# Patient Record
Sex: MALE | Race: WHITE | NOT HISPANIC OR LATINO | Employment: OTHER | URBAN - METROPOLITAN AREA
[De-identification: names, ages, dates, MRNs, and addresses within clinical notes are randomized per-mention and may not be internally consistent; named-entity substitution may affect disease eponyms.]

---

## 2021-08-11 ENCOUNTER — TRANSCRIBE ORDERS (OUTPATIENT)
Dept: SLEEP MEDICINE | Facility: HOSPITAL | Age: 61
End: 2021-08-11

## 2021-08-11 DIAGNOSIS — G47.30 SLEEP APNEA, UNSPECIFIED TYPE: Primary | ICD-10-CM

## 2021-08-15 ENCOUNTER — HOSPITAL ENCOUNTER (OUTPATIENT)
Dept: SLEEP MEDICINE | Facility: HOSPITAL | Age: 61
Discharge: HOME | End: 2021-08-15
Admitting: NURSE PRACTITIONER

## 2021-08-15 DIAGNOSIS — G47.30 SLEEP APNEA, UNSPECIFIED TYPE: ICD-10-CM

## 2021-08-15 PROCEDURE — 95810 POLYSOM 6/> YRS 4/> PARAM: CPT | Performed by: INTERNAL MEDICINE

## 2021-08-15 PROCEDURE — 95810 POLYSOM 6/> YRS 4/> PARAM: CPT

## 2022-06-26 ENCOUNTER — HOSPITAL ENCOUNTER (EMERGENCY)
Facility: HOSPITAL | Age: 62
Discharge: HOME | End: 2022-06-26
Attending: EMERGENCY MEDICINE | Admitting: EMERGENCY MEDICINE

## 2022-06-26 VITALS
SYSTOLIC BLOOD PRESSURE: 157 MMHG | BODY MASS INDEX: 44.16 KG/M2 | OXYGEN SATURATION: 96 % | RESPIRATION RATE: 18 BRPM | DIASTOLIC BLOOD PRESSURE: 81 MMHG | WEIGHT: 240 LBS | TEMPERATURE: 97.7 F | HEIGHT: 62 IN | HEART RATE: 73 BPM

## 2022-06-26 DIAGNOSIS — L25.9 CONTACT DERMATITIS, UNSPECIFIED CONTACT DERMATITIS TYPE, UNSPECIFIED TRIGGER: Primary | ICD-10-CM

## 2022-06-26 PROCEDURE — 99282 EMERGENCY DEPT VISIT SF MDM: CPT

## 2022-06-26 PROCEDURE — 25010000002 DEXAMETHASONE PER 1 MG: Performed by: NURSE PRACTITIONER

## 2022-06-26 PROCEDURE — 96372 THER/PROPH/DIAG INJ SC/IM: CPT

## 2022-06-26 RX ORDER — ASPIRIN 81 MG/1
81 TABLET, CHEWABLE ORAL DAILY
COMMUNITY

## 2022-06-26 RX ORDER — DIAPER,BRIEF,INFANT-TODD,DISP
1 EACH MISCELLANEOUS 2 TIMES DAILY
Qty: 56 G | Refills: 0 | Status: SHIPPED | OUTPATIENT
Start: 2022-06-26

## 2022-06-26 RX ORDER — PREDNISONE 20 MG/1
TABLET ORAL
Qty: 18 TABLET | Refills: 0 | Status: SHIPPED | OUTPATIENT
Start: 2022-06-26 | End: 2022-07-05

## 2022-06-26 RX ORDER — CEPHALEXIN 500 MG/1
500 CAPSULE ORAL 2 TIMES DAILY
Qty: 14 CAPSULE | Refills: 0 | Status: SHIPPED | OUTPATIENT
Start: 2022-06-26 | End: 2022-07-11 | Stop reason: SDUPTHER

## 2022-06-26 RX ORDER — DEXAMETHASONE SODIUM PHOSPHATE 10 MG/ML
10 INJECTION INTRAMUSCULAR; INTRAVENOUS ONCE
Status: COMPLETED | OUTPATIENT
Start: 2022-06-26 | End: 2022-06-26

## 2022-06-26 RX ORDER — CETIRIZINE HYDROCHLORIDE 10 MG/1
10 TABLET ORAL DAILY
Qty: 30 TABLET | Refills: 0 | Status: SHIPPED | OUTPATIENT
Start: 2022-06-26

## 2022-06-26 RX ORDER — BUMETANIDE 2 MG/1
2 TABLET ORAL DAILY
COMMUNITY

## 2022-06-26 RX ORDER — OXYCODONE HYDROCHLORIDE AND ACETAMINOPHEN 5; 325 MG/1; MG/1
1 TABLET ORAL EVERY 6 HOURS PRN
COMMUNITY

## 2022-06-26 RX ADMIN — DEXAMETHASONE SODIUM PHOSPHATE 10 MG: 10 INJECTION, SOLUTION INTRAMUSCULAR; INTRAVENOUS at 20:23

## 2022-07-11 ENCOUNTER — HOSPITAL ENCOUNTER (EMERGENCY)
Facility: HOSPITAL | Age: 62
Discharge: HOME | End: 2022-07-11
Attending: EMERGENCY MEDICINE | Admitting: EMERGENCY MEDICINE

## 2022-07-11 VITALS
RESPIRATION RATE: 20 BRPM | TEMPERATURE: 97.5 F | SYSTOLIC BLOOD PRESSURE: 162 MMHG | WEIGHT: 236.33 LBS | OXYGEN SATURATION: 99 % | HEART RATE: 84 BPM | BODY MASS INDEX: 43.23 KG/M2 | DIASTOLIC BLOOD PRESSURE: 88 MMHG

## 2022-07-11 DIAGNOSIS — L30.9 DERMATITIS: Primary | ICD-10-CM

## 2022-07-11 DIAGNOSIS — L70.9 ACNE, UNSPECIFIED ACNE TYPE: ICD-10-CM

## 2022-07-11 DIAGNOSIS — B35.4 TINEA CORPORIS: ICD-10-CM

## 2022-07-11 PROCEDURE — 87205 SMEAR GRAM STAIN: CPT

## 2022-07-11 PROCEDURE — 99283 EMERGENCY DEPT VISIT LOW MDM: CPT

## 2022-07-11 PROCEDURE — 25010000002 DEXAMETHASONE PER 1 MG

## 2022-07-11 PROCEDURE — 87070 CULTURE OTHR SPECIMN AEROBIC: CPT

## 2022-07-11 PROCEDURE — 96372 THER/PROPH/DIAG INJ SC/IM: CPT

## 2022-07-11 RX ORDER — DEXAMETHASONE SODIUM PHOSPHATE 10 MG/ML
10 INJECTION INTRAMUSCULAR; INTRAVENOUS ONCE
Status: COMPLETED | OUTPATIENT
Start: 2022-07-11 | End: 2022-07-11

## 2022-07-11 RX ORDER — KETOCONAZOLE 20 MG/G
1 CREAM TOPICAL DAILY
Qty: 14 G | Refills: 0 | Status: SHIPPED | OUTPATIENT
Start: 2022-07-11 | End: 2022-07-25

## 2022-07-11 RX ORDER — CEPHALEXIN 500 MG/1
500 CAPSULE ORAL 2 TIMES DAILY
Qty: 14 CAPSULE | Refills: 0 | Status: SHIPPED | OUTPATIENT
Start: 2022-07-11

## 2022-07-11 RX ADMIN — DEXAMETHASONE SODIUM PHOSPHATE 10 MG: 10 INJECTION INTRAMUSCULAR; INTRAVENOUS at 20:10

## 2022-07-11 NOTE — ED PROVIDER NOTES
Subjective   Patient 61-year-old male presenting today due to a rash.  Patient states that he was seen here on June 26 for the same thing and as soon as he finished his antibiotics the rash reappeared.  Patient was also prescribed hydrocortisone and states that it does not help.  Patient states he has a open sore on the buttocks, a circular rash on his left calf in small little bumps across his chest and back.  Patient denies any changes in laundry detergent or soaps.  He has a appointment scheduled with his PCP for this Friday and has not seen a dermatologist yet.       History provided by:  Patient   used: No    Rash  Location:  Torso and leg  Torso rash location:  L chest and R chest  Quality: itchiness    Quality: not draining, not red and not weeping    Context: animal contact and exposure to similar rash    Context: not medications and not new detergent/soap    Relieved by: abx.  Ineffective treatments:  Topical steroids      Review of Systems   Constitutional: Negative.    HENT: Negative.    Eyes: Negative.    Respiratory: Negative.    Cardiovascular: Negative.    Gastrointestinal: Negative.    Endocrine: Negative.    Genitourinary: Negative.    Musculoskeletal: Negative.    Skin: Positive for rash.   Allergic/Immunologic: Negative.    Neurological: Negative.    Hematological: Negative.    Psychiatric/Behavioral: Negative.        Past Medical History:   Diagnosis Date   • Hypertension    • Injury of back        Allergies   Allergen Reactions   • Dilantin [Phenytoin] Rash   • Latex Rash       Past Surgical History:   Procedure Laterality Date   • BACK SURGERY     • CARDIAC CATHETERIZATION     • CARPAL TUNNEL RELEASE     • ORIF FOREARM FRACTURE     • PATELLA FRACTURE SURGERY     • REPLACEMENT TOTAL KNEE         History reviewed. No pertinent family history.    Social History     Socioeconomic History   • Marital status:    Tobacco Use   • Smoking status: Former Smoker     Packs/day:  0.50     Types: Cigarettes   • Smokeless tobacco: Never Used   Vaping Use   • Vaping Use: Never used   Substance and Sexual Activity   • Alcohol use: Yes     Comment: social   • Drug use: Never           Objective   Physical Exam  Vitals and nursing note reviewed.   Constitutional:       Appearance: Normal appearance.   HENT:      Head: Normocephalic and atraumatic.      Right Ear: Tympanic membrane normal.      Left Ear: Tympanic membrane normal.      Nose: Nose normal.      Mouth/Throat:      Mouth: Mucous membranes are moist.   Eyes:      Extraocular Movements: Extraocular movements intact.      Conjunctiva/sclera: Conjunctivae normal.      Pupils: Pupils are equal, round, and reactive to light.   Cardiovascular:      Rate and Rhythm: Normal rate and regular rhythm.      Heart sounds: Normal heart sounds.   Pulmonary:      Effort: Pulmonary effort is normal.      Breath sounds: Normal breath sounds.   Musculoskeletal:         General: Normal range of motion.        Arms:       Cervical back: Normal range of motion and neck supple.        Legs:    Skin:     General: Skin is warm and dry.      Findings: Rash present.   Neurological:      General: No focal deficit present.      Mental Status: He is alert and oriented to person, place, and time.   Psychiatric:         Mood and Affect: Mood normal.         Behavior: Behavior normal.         Procedures           ED Course                                           MDM  Number of Diagnoses or Management Options  Diagnosis management comments: I have spoken with patient. I have explained the patient´s condition, diagnoses and treatment plan based on the information available to me at this time. I have answered the patient's questions and addressed any concerns. The patient has a good  understanding of the patient´s diagnosis, condition, and treatment plan as can be expected at this point. The vital signs have been stable. The patient´s condition is stable and appropriate  for discharge from the emergency department.      The patient will pursue further outpatient evaluation with the primary care physician or other designated or consulting physician as outlined in the discharge instructions. They are agreeable to this plan of care and follow-up instructions have been explained in detail. The patient has received these instructions in written format and have expressed an understanding of the discharge instructions. The patient is aware that any significant change in condition or worsening of symptoms should prompt an immediate return to this or the closest emergency department or call to 911.         Amount and/or Complexity of Data Reviewed  Clinical lab tests: ordered    Risk of Complications, Morbidity, and/or Mortality  Presenting problems: low  Diagnostic procedures: low  Management options: low    Patient Progress  Patient progress: stable      Final diagnoses:   Dermatitis   Tinea corporis   Acne, unspecified acne type       ED Disposition  ED Disposition     ED Disposition   Discharge    Condition   Stable    Comment   --             Vishal Jasso MD  [id number] [id number] [phone]    In 3 days           Medication List      New Prescriptions    ketoconazole 2 % cream  Commonly known as: NIZORAL  Apply 1 application topically to the appropriate area as directed Daily for 14 days.           Where to Get Your Medications      These medications were sent to [facility]/pharmacy #[id number] - [place] - [address] - [phone]  - [phone] FX  [address] [place] [address]    Hours: 24-hours Phone: [phone]   · cephalexin 500 MG capsule  · ketoconazole 2 % cream          Johann Rubalcava PA-C  07/11/22 2001

## 2022-07-12 NOTE — DISCHARGE INSTRUCTIONS
Please take antibiotics as prescribed until finished  Please apply ketoconazole cream on area to the left calf    You can use hydrocortisone cream or acne body wash on chest and upper back    You should receive a phone call from [facility] with lab results in about 2 or 3 days    Please follow-up with your PCP this coming Friday in get a dermatology referral

## 2022-07-14 LAB
BACTERIA SPEC AEROBE CULT: NORMAL
BACTERIA SPEC AEROBE CULT: NORMAL
GRAM STN SPEC: NORMAL
GRAM STN SPEC: NORMAL

## 2022-09-02 ENCOUNTER — TRANSCRIBE ORDERS (OUTPATIENT)
Dept: LAB | Facility: HOSPITAL | Age: 62
End: 2022-09-02

## 2022-09-02 ENCOUNTER — LAB (OUTPATIENT)
Dept: LAB | Facility: HOSPITAL | Age: 62
End: 2022-09-02

## 2022-09-02 DIAGNOSIS — Z79.899 ENCOUNTER FOR LONG-TERM (CURRENT) USE OF OTHER MEDICATIONS: ICD-10-CM

## 2022-09-02 DIAGNOSIS — L20.89 OTHER ATOPIC DERMATITIS: ICD-10-CM

## 2022-09-02 DIAGNOSIS — L20.89 OTHER ATOPIC DERMATITIS: Primary | ICD-10-CM

## 2022-09-02 LAB
ALBUMIN SERPL-MCNC: 4.2 G/DL (ref 3.5–5.2)
ALBUMIN/GLOB SERPL: 2 G/DL
ALP SERPL-CCNC: 60 U/L (ref 39–117)
ALT SERPL W P-5'-P-CCNC: 11 U/L (ref 1–41)
ANION GAP SERPL CALCULATED.3IONS-SCNC: 10 MMOL/L (ref 5–15)
AST SERPL-CCNC: 15 U/L (ref 1–40)
BASOPHILS # BLD AUTO: 0.05 10*3/MM3 (ref 0–0.2)
BASOPHILS NFR BLD AUTO: 0.7 % (ref 0–1.5)
BILIRUB SERPL-MCNC: 0.5 MG/DL (ref 0–1.2)
BUN SERPL-MCNC: 13 MG/DL (ref 8–23)
BUN/CREAT SERPL: 11.1 (ref 7–25)
CALCIUM SPEC-SCNC: 8.8 MG/DL (ref 8.6–10.5)
CHLORIDE SERPL-SCNC: 105 MMOL/L (ref 98–107)
CO2 SERPL-SCNC: 24 MMOL/L (ref 22–29)
CREAT SERPL-MCNC: 1.17 MG/DL (ref 0.76–1.27)
DEPRECATED RDW RBC AUTO: 44.7 FL (ref 37–54)
EGFRCR SERPLBLD CKD-EPI 2021: 70.9 ML/MIN/1.73
EOSINOPHIL # BLD AUTO: 0.35 10*3/MM3 (ref 0–0.4)
EOSINOPHIL NFR BLD AUTO: 4.9 % (ref 0.3–6.2)
ERYTHROCYTE [DISTWIDTH] IN BLOOD BY AUTOMATED COUNT: 13.4 % (ref 12.3–15.4)
GLOBULIN UR ELPH-MCNC: 2.1 GM/DL
GLUCOSE SERPL-MCNC: 93 MG/DL (ref 65–99)
HAV IGM SERPL QL IA: NORMAL
HBV CORE IGM SERPL QL IA: NORMAL
HBV SURFACE AG SERPL QL IA: NORMAL
HCT VFR BLD AUTO: 39.5 % (ref 37.5–51)
HCV AB SER DONR QL: NORMAL
HGB BLD-MCNC: 13.9 G/DL (ref 13–17.7)
IMM GRANULOCYTES # BLD AUTO: 0.02 10*3/MM3 (ref 0–0.05)
IMM GRANULOCYTES NFR BLD AUTO: 0.3 % (ref 0–0.5)
LYMPHOCYTES # BLD AUTO: 1.84 10*3/MM3 (ref 0.7–3.1)
LYMPHOCYTES NFR BLD AUTO: 25.9 % (ref 19.6–45.3)
MCH RBC QN AUTO: 32.4 PG (ref 26.6–33)
MCHC RBC AUTO-ENTMCNC: 35.2 G/DL (ref 31.5–35.7)
MCV RBC AUTO: 92.1 FL (ref 79–97)
MONOCYTES # BLD AUTO: 0.56 10*3/MM3 (ref 0.1–0.9)
MONOCYTES NFR BLD AUTO: 7.9 % (ref 5–12)
NEUTROPHILS NFR BLD AUTO: 4.28 10*3/MM3 (ref 1.7–7)
NEUTROPHILS NFR BLD AUTO: 60.3 % (ref 42.7–76)
NRBC BLD AUTO-RTO: 0 /100 WBC (ref 0–0.2)
PLATELET # BLD AUTO: 205 10*3/MM3 (ref 140–450)
PMV BLD AUTO: 10.7 FL (ref 6–12)
POTASSIUM SERPL-SCNC: 3.6 MMOL/L (ref 3.5–5.2)
PROT SERPL-MCNC: 6.3 G/DL (ref 6–8.5)
RBC # BLD AUTO: 4.29 10*6/MM3 (ref 4.14–5.8)
SODIUM SERPL-SCNC: 139 MMOL/L (ref 136–145)
WBC NRBC COR # BLD: 7.1 10*3/MM3 (ref 3.4–10.8)

## 2022-09-02 PROCEDURE — 36415 COLL VENOUS BLD VENIPUNCTURE: CPT

## 2022-09-02 PROCEDURE — 80074 ACUTE HEPATITIS PANEL: CPT

## 2022-09-02 PROCEDURE — 85025 COMPLETE CBC W/AUTO DIFF WBC: CPT

## 2022-09-02 PROCEDURE — 80053 COMPREHEN METABOLIC PANEL: CPT

## 2022-09-28 ENCOUNTER — TRANSCRIBE ORDERS (OUTPATIENT)
Dept: ADMINISTRATIVE | Facility: HOSPITAL | Age: 62
End: 2022-09-28

## 2022-09-28 ENCOUNTER — LAB (OUTPATIENT)
Dept: LAB | Facility: HOSPITAL | Age: 62
End: 2022-09-28

## 2022-09-28 DIAGNOSIS — L20.81 DIFFUSE NEURODERMATITIS OF BROCQ: ICD-10-CM

## 2022-09-28 DIAGNOSIS — Z79.899 ENCOUNTER FOR LONG-TERM (CURRENT) USE OF OTHER MEDICATIONS: Primary | ICD-10-CM

## 2022-09-28 DIAGNOSIS — Z79.899 ENCOUNTER FOR LONG-TERM (CURRENT) USE OF OTHER MEDICATIONS: ICD-10-CM

## 2022-09-28 LAB
ALBUMIN SERPL-MCNC: 4.3 G/DL (ref 3.5–5.2)
ALBUMIN/GLOB SERPL: 2 G/DL
ALP SERPL-CCNC: 68 U/L (ref 39–117)
ALT SERPL W P-5'-P-CCNC: 10 U/L (ref 1–41)
ANION GAP SERPL CALCULATED.3IONS-SCNC: 13 MMOL/L (ref 5–15)
AST SERPL-CCNC: 22 U/L (ref 1–40)
BASOPHILS # BLD AUTO: 0.08 10*3/MM3 (ref 0–0.2)
BASOPHILS NFR BLD AUTO: 1.1 % (ref 0–1.5)
BILIRUB SERPL-MCNC: 0.6 MG/DL (ref 0–1.2)
BUN SERPL-MCNC: 13 MG/DL (ref 8–23)
BUN/CREAT SERPL: 12.6 (ref 7–25)
CALCIUM SPEC-SCNC: 9 MG/DL (ref 8.6–10.5)
CHLORIDE SERPL-SCNC: 103 MMOL/L (ref 98–107)
CO2 SERPL-SCNC: 23 MMOL/L (ref 22–29)
CREAT SERPL-MCNC: 1.03 MG/DL (ref 0.76–1.27)
DEPRECATED RDW RBC AUTO: 45.5 FL (ref 37–54)
EGFRCR SERPLBLD CKD-EPI 2021: 82.6 ML/MIN/1.73
EOSINOPHIL # BLD AUTO: 0.44 10*3/MM3 (ref 0–0.4)
EOSINOPHIL NFR BLD AUTO: 6.2 % (ref 0.3–6.2)
ERYTHROCYTE [DISTWIDTH] IN BLOOD BY AUTOMATED COUNT: 13.1 % (ref 12.3–15.4)
GLOBULIN UR ELPH-MCNC: 2.2 GM/DL
GLUCOSE SERPL-MCNC: 107 MG/DL (ref 65–99)
HCT VFR BLD AUTO: 44.2 % (ref 37.5–51)
HGB BLD-MCNC: 14.9 G/DL (ref 13–17.7)
IMM GRANULOCYTES # BLD AUTO: 0.01 10*3/MM3 (ref 0–0.05)
IMM GRANULOCYTES NFR BLD AUTO: 0.1 % (ref 0–0.5)
LYMPHOCYTES # BLD AUTO: 1.84 10*3/MM3 (ref 0.7–3.1)
LYMPHOCYTES NFR BLD AUTO: 26.1 % (ref 19.6–45.3)
MCH RBC QN AUTO: 32.1 PG (ref 26.6–33)
MCHC RBC AUTO-ENTMCNC: 33.7 G/DL (ref 31.5–35.7)
MCV RBC AUTO: 95.3 FL (ref 79–97)
MONOCYTES # BLD AUTO: 0.68 10*3/MM3 (ref 0.1–0.9)
MONOCYTES NFR BLD AUTO: 9.6 % (ref 5–12)
NEUTROPHILS NFR BLD AUTO: 4 10*3/MM3 (ref 1.7–7)
NEUTROPHILS NFR BLD AUTO: 56.9 % (ref 42.7–76)
NRBC BLD AUTO-RTO: 0 /100 WBC (ref 0–0.2)
PLATELET # BLD AUTO: 209 10*3/MM3 (ref 140–450)
PMV BLD AUTO: 11.1 FL (ref 6–12)
POTASSIUM SERPL-SCNC: 4.1 MMOL/L (ref 3.5–5.2)
PROT SERPL-MCNC: 6.5 G/DL (ref 6–8.5)
RBC # BLD AUTO: 4.64 10*6/MM3 (ref 4.14–5.8)
SODIUM SERPL-SCNC: 139 MMOL/L (ref 136–145)
WBC NRBC COR # BLD: 7.05 10*3/MM3 (ref 3.4–10.8)

## 2022-09-28 PROCEDURE — 85025 COMPLETE CBC W/AUTO DIFF WBC: CPT

## 2022-09-28 PROCEDURE — 80053 COMPREHEN METABOLIC PANEL: CPT

## 2022-09-28 PROCEDURE — 36415 COLL VENOUS BLD VENIPUNCTURE: CPT

## 2022-10-13 ENCOUNTER — LAB (OUTPATIENT)
Dept: LAB | Facility: HOSPITAL | Age: 62
End: 2022-10-13

## 2022-10-13 ENCOUNTER — TRANSCRIBE ORDERS (OUTPATIENT)
Dept: ADMINISTRATIVE | Facility: HOSPITAL | Age: 62
End: 2022-10-13

## 2022-10-13 DIAGNOSIS — Z79.899 ENCOUNTER FOR LONG-TERM (CURRENT) USE OF OTHER MEDICATIONS: ICD-10-CM

## 2022-10-13 DIAGNOSIS — L20.81 DIFFUSE NEURODERMATITIS OF BROCQ: Primary | ICD-10-CM

## 2022-10-13 DIAGNOSIS — L20.81 DIFFUSE NEURODERMATITIS OF BROCQ: ICD-10-CM

## 2022-10-13 LAB
ALBUMIN SERPL-MCNC: 4.3 G/DL (ref 3.5–5.2)
ALBUMIN/GLOB SERPL: 1.7 G/DL
ALP SERPL-CCNC: 63 U/L (ref 39–117)
ALT SERPL W P-5'-P-CCNC: 14 U/L (ref 1–41)
ANION GAP SERPL CALCULATED.3IONS-SCNC: 11 MMOL/L (ref 5–15)
AST SERPL-CCNC: 17 U/L (ref 1–40)
BASOPHILS # BLD AUTO: 0.06 10*3/MM3 (ref 0–0.2)
BASOPHILS NFR BLD AUTO: 1 % (ref 0–1.5)
BILIRUB SERPL-MCNC: 0.6 MG/DL (ref 0–1.2)
BUN SERPL-MCNC: 20 MG/DL (ref 8–23)
BUN/CREAT SERPL: 17.4 (ref 7–25)
CALCIUM SPEC-SCNC: 9.2 MG/DL (ref 8.6–10.5)
CHLORIDE SERPL-SCNC: 99 MMOL/L (ref 98–107)
CO2 SERPL-SCNC: 26 MMOL/L (ref 22–29)
CREAT SERPL-MCNC: 1.15 MG/DL (ref 0.76–1.27)
DEPRECATED RDW RBC AUTO: 40.8 FL (ref 37–54)
EGFRCR SERPLBLD CKD-EPI 2021: 72.4 ML/MIN/1.73
EOSINOPHIL # BLD AUTO: 0.28 10*3/MM3 (ref 0–0.4)
EOSINOPHIL NFR BLD AUTO: 4.7 % (ref 0.3–6.2)
ERYTHROCYTE [DISTWIDTH] IN BLOOD BY AUTOMATED COUNT: 12.1 % (ref 12.3–15.4)
GLOBULIN UR ELPH-MCNC: 2.5 GM/DL
GLUCOSE SERPL-MCNC: 94 MG/DL (ref 65–99)
HCT VFR BLD AUTO: 42.6 % (ref 37.5–51)
HGB BLD-MCNC: 15.1 G/DL (ref 13–17.7)
IMM GRANULOCYTES # BLD AUTO: 0.01 10*3/MM3 (ref 0–0.05)
IMM GRANULOCYTES NFR BLD AUTO: 0.2 % (ref 0–0.5)
LYMPHOCYTES # BLD AUTO: 1.59 10*3/MM3 (ref 0.7–3.1)
LYMPHOCYTES NFR BLD AUTO: 26.4 % (ref 19.6–45.3)
MCH RBC QN AUTO: 33 PG (ref 26.6–33)
MCHC RBC AUTO-ENTMCNC: 35.4 G/DL (ref 31.5–35.7)
MCV RBC AUTO: 93.2 FL (ref 79–97)
MONOCYTES # BLD AUTO: 0.49 10*3/MM3 (ref 0.1–0.9)
MONOCYTES NFR BLD AUTO: 8.1 % (ref 5–12)
NEUTROPHILS NFR BLD AUTO: 3.59 10*3/MM3 (ref 1.7–7)
NEUTROPHILS NFR BLD AUTO: 59.6 % (ref 42.7–76)
NRBC BLD AUTO-RTO: 0 /100 WBC (ref 0–0.2)
PLATELET # BLD AUTO: 262 10*3/MM3 (ref 140–450)
PMV BLD AUTO: 10.9 FL (ref 6–12)
POTASSIUM SERPL-SCNC: 4.3 MMOL/L (ref 3.5–5.2)
PROT SERPL-MCNC: 6.8 G/DL (ref 6–8.5)
RBC # BLD AUTO: 4.57 10*6/MM3 (ref 4.14–5.8)
SODIUM SERPL-SCNC: 136 MMOL/L (ref 136–145)
WBC NRBC COR # BLD: 6.02 10*3/MM3 (ref 3.4–10.8)

## 2022-10-13 PROCEDURE — 80053 COMPREHEN METABOLIC PANEL: CPT

## 2022-10-13 PROCEDURE — 85025 COMPLETE CBC W/AUTO DIFF WBC: CPT

## 2022-10-13 PROCEDURE — 36415 COLL VENOUS BLD VENIPUNCTURE: CPT

## 2022-12-20 ENCOUNTER — LAB (OUTPATIENT)
Dept: LAB | Facility: HOSPITAL | Age: 62
End: 2022-12-20

## 2022-12-20 ENCOUNTER — TRANSCRIBE ORDERS (OUTPATIENT)
Dept: ADMINISTRATIVE | Facility: HOSPITAL | Age: 62
End: 2022-12-20

## 2022-12-20 DIAGNOSIS — L20.84 INTRINSIC ALLERGIC ECZEMA: ICD-10-CM

## 2022-12-20 DIAGNOSIS — Z79.899 ENCOUNTER FOR LONG-TERM (CURRENT) USE OF OTHER MEDICATIONS: ICD-10-CM

## 2022-12-20 DIAGNOSIS — Z79.899 ENCOUNTER FOR LONG-TERM (CURRENT) USE OF OTHER MEDICATIONS: Primary | ICD-10-CM

## 2022-12-20 LAB
ALBUMIN SERPL-MCNC: 4.6 G/DL (ref 3.5–5.2)
ALBUMIN/GLOB SERPL: 1.7 G/DL
ALP SERPL-CCNC: 64 U/L (ref 39–117)
ALT SERPL W P-5'-P-CCNC: 15 U/L (ref 1–41)
ANION GAP SERPL CALCULATED.3IONS-SCNC: 8.7 MMOL/L (ref 5–15)
AST SERPL-CCNC: 21 U/L (ref 1–40)
BASOPHILS # BLD AUTO: 0.06 10*3/MM3 (ref 0–0.2)
BASOPHILS NFR BLD AUTO: 0.8 % (ref 0–1.5)
BILIRUB SERPL-MCNC: 0.4 MG/DL (ref 0–1.2)
BUN SERPL-MCNC: 17 MG/DL (ref 8–23)
BUN/CREAT SERPL: 14.9 (ref 7–25)
CALCIUM SPEC-SCNC: 9.4 MG/DL (ref 8.6–10.5)
CHLORIDE SERPL-SCNC: 103 MMOL/L (ref 98–107)
CO2 SERPL-SCNC: 24.3 MMOL/L (ref 22–29)
CREAT SERPL-MCNC: 1.14 MG/DL (ref 0.76–1.27)
DEPRECATED RDW RBC AUTO: 43.8 FL (ref 37–54)
EGFRCR SERPLBLD CKD-EPI 2021: 73.2 ML/MIN/1.73
EOSINOPHIL # BLD AUTO: 0.13 10*3/MM3 (ref 0–0.4)
EOSINOPHIL NFR BLD AUTO: 1.7 % (ref 0.3–6.2)
ERYTHROCYTE [DISTWIDTH] IN BLOOD BY AUTOMATED COUNT: 12.8 % (ref 12.3–15.4)
GLOBULIN UR ELPH-MCNC: 2.7 GM/DL
GLUCOSE SERPL-MCNC: 89 MG/DL (ref 65–99)
HCT VFR BLD AUTO: 48.7 % (ref 37.5–51)
HGB BLD-MCNC: 16.3 G/DL (ref 13–17.7)
IMM GRANULOCYTES # BLD AUTO: 0.02 10*3/MM3 (ref 0–0.05)
IMM GRANULOCYTES NFR BLD AUTO: 0.3 % (ref 0–0.5)
LYMPHOCYTES # BLD AUTO: 1.58 10*3/MM3 (ref 0.7–3.1)
LYMPHOCYTES NFR BLD AUTO: 20.3 % (ref 19.6–45.3)
MCH RBC QN AUTO: 31.2 PG (ref 26.6–33)
MCHC RBC AUTO-ENTMCNC: 33.5 G/DL (ref 31.5–35.7)
MCV RBC AUTO: 93.3 FL (ref 79–97)
MONOCYTES # BLD AUTO: 0.6 10*3/MM3 (ref 0.1–0.9)
MONOCYTES NFR BLD AUTO: 7.7 % (ref 5–12)
NEUTROPHILS NFR BLD AUTO: 5.4 10*3/MM3 (ref 1.7–7)
NEUTROPHILS NFR BLD AUTO: 69.2 % (ref 42.7–76)
NRBC BLD AUTO-RTO: 0 /100 WBC (ref 0–0.2)
PLATELET # BLD AUTO: 233 10*3/MM3 (ref 140–450)
PMV BLD AUTO: 11.6 FL (ref 6–12)
POTASSIUM SERPL-SCNC: 4.6 MMOL/L (ref 3.5–5.2)
PROT SERPL-MCNC: 7.3 G/DL (ref 6–8.5)
RBC # BLD AUTO: 5.22 10*6/MM3 (ref 4.14–5.8)
SODIUM SERPL-SCNC: 136 MMOL/L (ref 136–145)
WBC NRBC COR # BLD: 7.79 10*3/MM3 (ref 3.4–10.8)

## 2022-12-20 PROCEDURE — 80053 COMPREHEN METABOLIC PANEL: CPT

## 2022-12-20 PROCEDURE — 85025 COMPLETE CBC W/AUTO DIFF WBC: CPT

## 2022-12-20 PROCEDURE — 36415 COLL VENOUS BLD VENIPUNCTURE: CPT

## 2023-03-11 ENCOUNTER — APPOINTMENT (OUTPATIENT)
Dept: GENERAL RADIOLOGY | Facility: HOSPITAL | Age: 63
End: 2023-03-11
Payer: OTHER GOVERNMENT

## 2023-03-11 ENCOUNTER — HOSPITAL ENCOUNTER (EMERGENCY)
Facility: HOSPITAL | Age: 63
Discharge: HOME | End: 2023-03-11
Attending: EMERGENCY MEDICINE | Admitting: EMERGENCY MEDICINE
Payer: OTHER GOVERNMENT

## 2023-03-11 ENCOUNTER — APPOINTMENT (OUTPATIENT)
Dept: CT IMAGING | Facility: HOSPITAL | Age: 63
End: 2023-03-11
Payer: OTHER GOVERNMENT

## 2023-03-11 VITALS
HEIGHT: 62 IN | OXYGEN SATURATION: 90 % | RESPIRATION RATE: 17 BRPM | TEMPERATURE: 99.5 F | SYSTOLIC BLOOD PRESSURE: 144 MMHG | DIASTOLIC BLOOD PRESSURE: 73 MMHG | BODY MASS INDEX: 43.23 KG/M2 | HEART RATE: 80 BPM

## 2023-03-11 DIAGNOSIS — U07.1 COVID-19: ICD-10-CM

## 2023-03-11 DIAGNOSIS — J20.9 ACUTE BRONCHITIS, UNSPECIFIED ORGANISM: Primary | ICD-10-CM

## 2023-03-11 LAB
ALBUMIN SERPL-MCNC: 3.6 G/DL (ref 3.5–5.2)
ALBUMIN/GLOB SERPL: 1.2 G/DL
ALP SERPL-CCNC: 82 U/L (ref 39–117)
ALT SERPL W P-5'-P-CCNC: 17 U/L (ref 1–41)
ANION GAP SERPL CALCULATED.3IONS-SCNC: 11.5 MMOL/L (ref 5–15)
APTT PPP: 32.5 SECONDS (ref 24.2–34.2)
AST SERPL-CCNC: 28 U/L (ref 1–40)
BASOPHILS # BLD AUTO: 0.05 10*3/MM3 (ref 0–0.2)
BASOPHILS NFR BLD AUTO: 1 % (ref 0–1.5)
BILIRUB SERPL-MCNC: 0.5 MG/DL (ref 0–1.2)
BUN SERPL-MCNC: 11 MG/DL (ref 8–23)
BUN/CREAT SERPL: 10.8 (ref 7–25)
CALCIUM SPEC-SCNC: 8.3 MG/DL (ref 8.6–10.5)
CHLORIDE SERPL-SCNC: 98 MMOL/L (ref 98–107)
CO2 SERPL-SCNC: 25.5 MMOL/L (ref 22–29)
CREAT SERPL-MCNC: 1.02 MG/DL (ref 0.76–1.27)
D-LACTATE SERPL-SCNC: 1.7 MMOL/L (ref 0.5–2)
DEPRECATED RDW RBC AUTO: 43.5 FL (ref 37–54)
EGFRCR SERPLBLD CKD-EPI 2021: 83.1 ML/MIN/1.73
EOSINOPHIL # BLD AUTO: 0.21 10*3/MM3 (ref 0–0.4)
EOSINOPHIL NFR BLD AUTO: 4.4 % (ref 0.3–6.2)
ERYTHROCYTE [DISTWIDTH] IN BLOOD BY AUTOMATED COUNT: 12.7 % (ref 12.3–15.4)
FLUAV AG NPH QL: NEGATIVE
FLUBV AG NPH QL IA: NEGATIVE
GEN 5 2HR TROPONIN T REFLEX: 34 NG/L
GLOBULIN UR ELPH-MCNC: 2.9 GM/DL
GLUCOSE SERPL-MCNC: 98 MG/DL (ref 65–99)
HCT VFR BLD AUTO: 33.5 % (ref 37.5–51)
HGB BLD-MCNC: 11.5 G/DL (ref 13–17.7)
IMM GRANULOCYTES # BLD AUTO: 0.02 10*3/MM3 (ref 0–0.05)
IMM GRANULOCYTES NFR BLD AUTO: 0.4 % (ref 0–0.5)
INR PPP: 1 (ref 0.86–1.15)
LYMPHOCYTES # BLD AUTO: 0.72 10*3/MM3 (ref 0.7–3.1)
LYMPHOCYTES NFR BLD AUTO: 15 % (ref 19.6–45.3)
MCH RBC QN AUTO: 32.8 PG (ref 26.6–33)
MCHC RBC AUTO-ENTMCNC: 34.3 G/DL (ref 31.5–35.7)
MCV RBC AUTO: 95.4 FL (ref 79–97)
MONOCYTES # BLD AUTO: 0.43 10*3/MM3 (ref 0.1–0.9)
MONOCYTES NFR BLD AUTO: 9 % (ref 5–12)
NEUTROPHILS NFR BLD AUTO: 3.36 10*3/MM3 (ref 1.7–7)
NEUTROPHILS NFR BLD AUTO: 70.2 % (ref 42.7–76)
NRBC BLD AUTO-RTO: 0 /100 WBC (ref 0–0.2)
NT-PROBNP SERPL-MCNC: 1281 PG/ML (ref 0–900)
PLATELET # BLD AUTO: 261 10*3/MM3 (ref 140–450)
PMV BLD AUTO: 9.6 FL (ref 6–12)
POTASSIUM SERPL-SCNC: 4.7 MMOL/L (ref 3.5–5.2)
PROT SERPL-MCNC: 6.5 G/DL (ref 6–8.5)
PROTHROMBIN TIME: 13.3 SECONDS (ref 11.8–14.9)
RBC # BLD AUTO: 3.51 10*6/MM3 (ref 4.14–5.8)
SODIUM SERPL-SCNC: 135 MMOL/L (ref 136–145)
TROPONIN T DELTA: -3 NG/L
TROPONIN T SERPL HS-MCNC: 37 NG/L
WBC NRBC COR # BLD: 4.79 10*3/MM3 (ref 3.4–10.8)

## 2023-03-11 PROCEDURE — 94664 DEMO&/EVAL PT USE INHALER: CPT

## 2023-03-11 PROCEDURE — 85025 COMPLETE CBC W/AUTO DIFF WBC: CPT | Performed by: EMERGENCY MEDICINE

## 2023-03-11 PROCEDURE — 94799 UNLISTED PULMONARY SVC/PX: CPT

## 2023-03-11 PROCEDURE — 83880 ASSAY OF NATRIURETIC PEPTIDE: CPT | Performed by: EMERGENCY MEDICINE

## 2023-03-11 PROCEDURE — 36415 COLL VENOUS BLD VENIPUNCTURE: CPT

## 2023-03-11 PROCEDURE — 96374 THER/PROPH/DIAG INJ IV PUSH: CPT

## 2023-03-11 PROCEDURE — 85730 THROMBOPLASTIN TIME PARTIAL: CPT | Performed by: EMERGENCY MEDICINE

## 2023-03-11 PROCEDURE — 71045 X-RAY EXAM CHEST 1 VIEW: CPT

## 2023-03-11 PROCEDURE — 85610 PROTHROMBIN TIME: CPT | Performed by: EMERGENCY MEDICINE

## 2023-03-11 PROCEDURE — 94640 AIRWAY INHALATION TREATMENT: CPT

## 2023-03-11 PROCEDURE — 25010000002 MORPHINE PER 10 MG: Performed by: EMERGENCY MEDICINE

## 2023-03-11 PROCEDURE — 80053 COMPREHEN METABOLIC PANEL: CPT | Performed by: EMERGENCY MEDICINE

## 2023-03-11 PROCEDURE — 25010000002 ONDANSETRON PER 1 MG: Performed by: EMERGENCY MEDICINE

## 2023-03-11 PROCEDURE — 25510000001 IOPAMIDOL PER 1 ML: Performed by: EMERGENCY MEDICINE

## 2023-03-11 PROCEDURE — 83605 ASSAY OF LACTIC ACID: CPT | Performed by: EMERGENCY MEDICINE

## 2023-03-11 PROCEDURE — 93005 ELECTROCARDIOGRAM TRACING: CPT | Performed by: EMERGENCY MEDICINE

## 2023-03-11 PROCEDURE — 87040 BLOOD CULTURE FOR BACTERIA: CPT | Performed by: EMERGENCY MEDICINE

## 2023-03-11 PROCEDURE — U0004 COV-19 TEST NON-CDC HGH THRU: HCPCS | Performed by: EMERGENCY MEDICINE

## 2023-03-11 PROCEDURE — 99283 EMERGENCY DEPT VISIT LOW MDM: CPT

## 2023-03-11 PROCEDURE — 71260 CT THORAX DX C+: CPT

## 2023-03-11 PROCEDURE — 25010000002 METHYLPREDNISOLONE PER 125 MG: Performed by: EMERGENCY MEDICINE

## 2023-03-11 PROCEDURE — 87804 INFLUENZA ASSAY W/OPTIC: CPT | Performed by: EMERGENCY MEDICINE

## 2023-03-11 PROCEDURE — 96375 TX/PRO/DX INJ NEW DRUG ADDON: CPT

## 2023-03-11 PROCEDURE — 84484 ASSAY OF TROPONIN QUANT: CPT | Performed by: EMERGENCY MEDICINE

## 2023-03-11 RX ORDER — IPRATROPIUM BROMIDE AND ALBUTEROL SULFATE 2.5; .5 MG/3ML; MG/3ML
3 SOLUTION RESPIRATORY (INHALATION)
Status: COMPLETED | OUTPATIENT
Start: 2023-03-11 | End: 2023-03-11

## 2023-03-11 RX ORDER — ONDANSETRON 2 MG/ML
4 INJECTION INTRAMUSCULAR; INTRAVENOUS ONCE
Status: COMPLETED | OUTPATIENT
Start: 2023-03-11 | End: 2023-03-11

## 2023-03-11 RX ORDER — SODIUM CHLORIDE 0.9 % (FLUSH) 0.9 %
10 SYRINGE (ML) INJECTION AS NEEDED
Status: DISCONTINUED | OUTPATIENT
Start: 2023-03-11 | End: 2023-03-12 | Stop reason: HOSPADM

## 2023-03-11 RX ORDER — METHYLPREDNISOLONE SODIUM SUCCINATE 125 MG/2ML
125 INJECTION, POWDER, LYOPHILIZED, FOR SOLUTION INTRAMUSCULAR; INTRAVENOUS ONCE
Status: COMPLETED | OUTPATIENT
Start: 2023-03-11 | End: 2023-03-11

## 2023-03-11 RX ADMIN — IPRATROPIUM BROMIDE AND ALBUTEROL SULFATE 3 ML: .5; 2.5 SOLUTION RESPIRATORY (INHALATION) at 19:18

## 2023-03-11 RX ADMIN — METHYLPREDNISOLONE SODIUM SUCCINATE 125 MG: 125 INJECTION, POWDER, FOR SOLUTION INTRAMUSCULAR; INTRAVENOUS at 19:43

## 2023-03-11 RX ADMIN — ONDANSETRON 4 MG: 2 INJECTION INTRAMUSCULAR; INTRAVENOUS at 20:53

## 2023-03-11 RX ADMIN — IOPAMIDOL 60 ML: 755 INJECTION, SOLUTION INTRAVENOUS at 20:43

## 2023-03-11 RX ADMIN — IPRATROPIUM BROMIDE AND ALBUTEROL SULFATE 3 ML: .5; 2.5 SOLUTION RESPIRATORY (INHALATION) at 19:17

## 2023-03-11 RX ADMIN — MORPHINE SULFATE 4 MG: 4 INJECTION, SOLUTION INTRAMUSCULAR; INTRAVENOUS at 20:54

## 2023-03-11 RX ADMIN — IPRATROPIUM BROMIDE AND ALBUTEROL SULFATE 3 ML: .5; 2.5 SOLUTION RESPIRATORY (INHALATION) at 19:16

## 2023-03-11 NOTE — ED PROVIDER NOTES
Time: 5:44 PM EST  Date of encounter:  3/11/2023  Independent Historian/Clinical History and Information was obtained by:   Patient  Chief Complaint: Myalgias, Chills    History is limited by: N/A    History of Present Illness:  Patient is a 62 y.o. year old male who presents to the emergency department for evaluation of myalgias and chills x 2 days.     Patient had back effusion surgery done on February 28th at [facility]. Patient states everything went well post-operatively. Patient's pain is being managed. Patient endorses myalgias and chills since Thursday night. Patient took a at home COVID test yesterday that was positive. Patient has not seen a doctor for COVID. Patient states his home nurse came to check on him today and he had high blood pressure, 190/106 at 3:30 PM. The home nurse checked it 3 times at it stayed in that range. Patient did not have any symptoms for HTN. Patient took his blood pressure medications this morning prior to the nurse checking his blood pressure. Patient did not take any extra medication after and his blood pressure came down on its own. Patient notes back pain and chest pain that he has had post-op. Patient is unable to take deep breath. The nurse thought his left side felt different than his right side when breathing. No hx of blood clots in legs or lungs. Patient is not on blood thinners. Patient was a former smoke that quit a little over a year ago. Patient is not a diabetic.         Patient Care Team  Primary Care Provider: Vishal Jasso MD    Past Medical History:     Allergies   Allergen Reactions   • Dilantin [Phenytoin] Rash   • Latex Rash     Past Medical History:   Diagnosis Date   • Hypertension    • Injury of back      Past Surgical History:   Procedure Laterality Date   • BACK SURGERY     • CARDIAC CATHETERIZATION     • CARPAL TUNNEL RELEASE     • ORIF FOREARM FRACTURE     • PATELLA FRACTURE SURGERY     • REPLACEMENT TOTAL KNEE       History reviewed. No  pertinent family history.    Home Medications:  Prior to Admission medications    Medication Sig Start Date End Date Taking? Authorizing Provider   aspirin 81 MG chewable tablet Chew 81 mg Daily.    Jr Muniz MD   bumetanide (BUMEX) 2 MG tablet Take 2 mg by mouth Daily.    Jr Muniz MD   cephalexin (KEFLEX) 500 MG capsule Take 1 capsule by mouth 2 (Two) Times a Day. 7/11/22   Johann Rubalcava PA-C   cetirizine (zyrTEC) 10 MG tablet Take 1 tablet by mouth Daily. 6/26/22   Tori Oseguera APRN   hydrocortisone 1 % ointment Apply 1 application topically to the appropriate area as directed 2 (Two) Times a Day. 6/26/22   Tori Oseguera APRN   METOPROLOL SUCCINATE PO Take  by mouth.    ProviderJr MD   oxyCODONE-acetaminophen (PERCOCET) 5-325 MG per tablet Take 1 tablet by mouth Every 6 (Six) Hours As Needed.    ProviderJr MD        Social History:   Social History     Tobacco Use   • Smoking status: Former     Packs/day: 0.50     Types: Cigarettes   • Smokeless tobacco: Never   Vaping Use   • Vaping Use: Never used   Substance Use Topics   • Alcohol use: Yes     Comment: social   • Drug use: Never         Review of Systems:  Review of Systems   Constitutional: Positive for chills. Negative for diaphoresis and fever.   HENT: Negative for congestion, postnasal drip, rhinorrhea and sore throat.    Eyes: Negative for photophobia.   Respiratory: Positive for shortness of breath. Negative for cough and chest tightness.    Cardiovascular: Positive for chest pain. Negative for palpitations and leg swelling.   Gastrointestinal: Negative for abdominal pain, diarrhea, nausea and vomiting.   Genitourinary: Negative for difficulty urinating, dysuria, flank pain, frequency, hematuria and urgency.   Musculoskeletal: Positive for back pain and myalgias. Negative for neck pain and neck stiffness.   Skin: Negative for pallor and rash.   Neurological: Negative for dizziness, syncope, weakness,  "numbness and headaches.   Hematological: Negative for adenopathy. Does not bruise/bleed easily.   Psychiatric/Behavioral: Negative.         Physical Exam:  /73   Pulse 80   Temp 99.5 °F (37.5 °C) (Oral)   Resp 17   Ht 157.5 cm (62\")   SpO2 90%   BMI 43.23 kg/m²     Physical Exam  Vitals and nursing note reviewed.   Constitutional:       General: He is not in acute distress.     Appearance: Normal appearance. He is not ill-appearing, toxic-appearing or diaphoretic.   HENT:      Head: Normocephalic and atraumatic.      Mouth/Throat:      Mouth: Mucous membranes are moist.   Eyes:      Pupils: Pupils are equal, round, and reactive to light.   Cardiovascular:      Rate and Rhythm: Normal rate and regular rhythm.      Pulses: Normal pulses.           Carotid pulses are 2+ on the right side and 2+ on the left side.       Radial pulses are 2+ on the right side and 2+ on the left side.        Femoral pulses are 2+ on the right side and 2+ on the left side.       Popliteal pulses are 2+ on the right side and 2+ on the left side.        Dorsalis pedis pulses are 2+ on the right side and 2+ on the left side.        Posterior tibial pulses are 2+ on the right side and 2+ on the left side.      Heart sounds: Normal heart sounds. No murmur heard.  Pulmonary:      Effort: Pulmonary effort is normal. No accessory muscle usage, respiratory distress or retractions.      Breath sounds: Examination of the right-upper field reveals decreased breath sounds. Examination of the left-upper field reveals decreased breath sounds. Examination of the right-middle field reveals decreased breath sounds. Examination of the left-middle field reveals decreased breath sounds. Examination of the right-lower field reveals decreased breath sounds. Examination of the left-lower field reveals decreased breath sounds. Decreased breath sounds and wheezing (faint) present. No rhonchi or rales.      Comments: No crackles.   Abdominal:      " General: Abdomen is flat. There is no distension.      Palpations: Abdomen is soft. There is no mass.      Tenderness: There is no abdominal tenderness. There is no right CVA tenderness, left CVA tenderness, guarding or rebound.      Comments: No rigidity. Surgical scar on his abdomen that is distended.    Musculoskeletal:         General: No swelling, tenderness or deformity.      Cervical back: Normal range of motion and neck supple. No rigidity or tenderness.      Right lower leg: No tenderness. Edema present.      Left lower leg: No tenderness. Edema present.      Comments: Trace edema bilateral legs. Large surgical scar from the thoracic to lumbar spine region that is intact with no erythema or discharge.    Skin:     General: Skin is warm and dry.      Capillary Refill: Capillary refill takes less than 2 seconds.      Coloration: Skin is not jaundiced or pale.      Findings: No erythema.   Neurological:      General: No focal deficit present.      Mental Status: He is alert and oriented to person, place, and time. Mental status is at baseline.      Sensory: No sensory deficit.      Motor: No weakness.   Psychiatric:         Mood and Affect: Mood normal.         Behavior: Behavior normal.                  Procedures:  Procedures      Medical Decision Making:      Comorbidities that affect care:    Hypertension    External Notes reviewed:    None      The following orders were placed and all results were independently analyzed by me:  Orders Placed This Encounter   Procedures   • Blood Culture - Blood,   • Blood Culture - Blood,   • COVID-19,APTIMA PANTHER(MARCELA),[facility] DUSTIN, NP/OP SWAB IN UTM/VTM/SALINE TRANSPORT MEDIA,24 HR TAT - Swab, Nasal Cavity   • Influenza Antigen, Rapid - Swab, Nasopharynx   • XR Chest 1 View   • CT Chest With Contrast Diagnostic   • Comprehensive Metabolic Panel   • Lactic Acid, Plasma   • Protime-INR   • aPTT   • BNP   • High Sensitivity Troponin T   • CBC Auto Differential   • High  Sensitivity Troponin T 2Hr   • ECG 12 Lead Dyspnea   • CBC & Differential       Medications Given in the Emergency Department:  Medications   ipratropium-albuterol (DUO-NEB) nebulizer solution 3 mL (3 mL Nebulization Given 3/11/23 1918)   methylPREDNISolone sodium succinate (SOLU-Medrol) injection 125 mg (125 mg Intravenous Given 3/11/23 1943)   morphine injection 4 mg (4 mg Intravenous Given 3/11/23 2054)   ondansetron (ZOFRAN) injection 4 mg (4 mg Intravenous Given 3/11/23 2053)   iopamidol (ISOVUE-370) 76 % injection 100 mL (60 mL Intravenous Given 3/11/23 2043)        ED Course:    ED Course as of 03/12/23 0154   Sat Mar 11, 2023   1733 EKG:    Rhythm: Sinus rhythm  Rate: 78  RSR prime in V1 and V2  Intervals: Normal SC and QT interval  T-wave: Baseline artifact obscures detail, nonspecific T wave flattening  ST Segment: Again, baseline artifact obscures detail, there is no pathological ST elevation or reciprocal ST depression to suggest acute myocardial infarction    EKG Comparison: No EKG available for comparison    Interpreted by me   [SD]      ED Course User Index  [SD] Dante Garcia DO       Labs:    Lab Results (last 24 hours)     Procedure Component Value Units Date/Time    COVID-19,APTIMA PANTHER(MARCELA),[facility] GURU/[facility] DUSTIN, NP/OP SWAB IN UTM/VTM/SALINE TRANSPORT MEDIA,24 HR TAT - Swab, Nasal Cavity [[id number]] Collected: 03/11/23 1834    Specimen: Swab from Nasal Cavity Updated: 03/11/23 1839    Influenza Antigen, Rapid - Swab, Nasopharynx [[id number]]  (Normal) Collected: 03/11/23 1834    Specimen: Swab from Nasopharynx Updated: 03/11/23 1908     Influenza A Ag, EIA Negative     Influenza B Ag, EIA Negative    CBC & Differential [[id number]]  (Abnormal) Collected: 03/11/23 1903    Specimen: Blood Updated: 03/11/23 1913    Narrative:      The following orders were created for panel order CBC & Differential.  Procedure                               Abnormality         Status                     ---------                                -----------         ------                     CBC Auto Differential[[id number]]        Abnormal            Final result                 Please view results for these tests on the individual orders.    Comprehensive Metabolic Panel [[id number]]  (Abnormal) Collected: 03/11/23 1903    Specimen: Blood Updated: 03/11/23 1948     Glucose 98 mg/dL      BUN 11 mg/dL      Creatinine 1.02 mg/dL      Sodium 135 mmol/L      Potassium 4.7 mmol/L      Comment: Slight hemolysis detected by analyzer. Results may be affected.        Chloride 98 mmol/L      CO2 25.5 mmol/L      Calcium 8.3 mg/dL      Total Protein 6.5 g/dL      Albumin 3.6 g/dL      ALT (SGPT) 17 U/L      AST (SGOT) 28 U/L      Comment: Slight hemolysis detected by analyzer. Results may be affected.        Alkaline Phosphatase 82 U/L      Total Bilirubin 0.5 mg/dL      Globulin 2.9 gm/dL      A/G Ratio 1.2 g/dL      BUN/Creatinine Ratio 10.8     Anion Gap 11.5 mmol/L      eGFR 83.1 mL/min/1.73     Narrative:      GFR Normal >60  Chronic Kidney Disease <60  Kidney Failure <15      Blood Culture - Blood, Arm, Left [[id number]] Collected: 03/11/23 1903    Specimen: Blood from Arm, Left Updated: 03/11/23 1917    Blood Culture - Blood, Arm, Left [[id number]] Collected: 03/11/23 1903    Specimen: Blood from Arm, Left Updated: 03/11/23 1918    Lactic Acid, Plasma [[id number]]  (Normal) Collected: 03/11/23 1903    Specimen: Blood Updated: 03/11/23 1936     Lactate 1.7 mmol/L     Protime-INR [[id number]]  (Normal) Collected: 03/11/23 1903    Specimen: Blood Updated: 03/11/23 1931     Protime 13.3 Seconds      INR 1.00    Narrative:      Suggested Therapeutic Ranges For Oral Anticoagulant Therapy:  Level of Therapy                      INR Target Range  Standard Dose                            2.0-3.0  High Dose                                2.5-3.5  Patients not receiving anticoagulant  Therapy Normal Range                     0.86-1.15    aPTT [[id number]]   (Normal) Collected: 03/11/23 1903    Specimen: Blood Updated: 03/11/23 1931     PTT 32.5 seconds     BNP [[id number]]  (Abnormal) Collected: 03/11/23 1903    Specimen: Blood Updated: 03/11/23 1934     proBNP 1,281.0 pg/mL     Narrative:      Among patients with dyspnea, NT-proBNP is highly sensitive for the detection of acute congestive heart failure. In addition NT-proBNP of <300 pg/ml effectively rules out acute congestive heart failure with 99% negative predictive value.      High Sensitivity Troponin T [[id number]]  (Abnormal) Collected: 03/11/23 1903    Specimen: Blood Updated: 03/11/23 1936     HS Troponin T 37 ng/L     Narrative:      High Sensitive Troponin T Reference Range:  <10.0 ng/L- Negative Female for AMI  <15.0 ng/L- Negative Male for AMI  >=10 - Abnormal Female indicating possible myocardial injury.  >=15 - Abnormal Male indicating possible myocardial injury.   Clinicians would have to utilize clinical acumen, EKG, Troponin, and serial changes to determine if it is an Acute Myocardial Infarction or myocardial injury due to an underlying chronic condition.         CBC Auto Differential [[id number]]  (Abnormal) Collected: 03/11/23 1903    Specimen: Blood Updated: 03/11/23 1913     WBC 4.79 10*3/mm3      RBC 3.51 10*6/mm3      Hemoglobin 11.5 g/dL      Hematocrit 33.5 %      MCV 95.4 fL      MCH 32.8 pg      MCHC 34.3 g/dL      RDW 12.7 %      RDW-SD 43.5 fl      MPV 9.6 fL      Platelets 261 10*3/mm3      Neutrophil % 70.2 %      Lymphocyte % 15.0 %      Monocyte % 9.0 %      Eosinophil % 4.4 %      Basophil % 1.0 %      Immature Grans % 0.4 %      Neutrophils, Absolute 3.36 10*3/mm3      Lymphocytes, Absolute 0.72 10*3/mm3      Monocytes, Absolute 0.43 10*3/mm3      Eosinophils, Absolute 0.21 10*3/mm3      Basophils, Absolute 0.05 10*3/mm3      Immature Grans, Absolute 0.02 10*3/mm3      nRBC 0.0 /100 WBC     High Sensitivity Troponin T 2Hr [[id number]]  (Abnormal) Collected: 03/11/23 2132    Specimen:  Blood Updated: 03/11/23 2153     HS Troponin T 34 ng/L      Troponin T Delta -3 ng/L     Narrative:      High Sensitive Troponin T Reference Range:  <10.0 ng/L- Negative Female for AMI  <15.0 ng/L- Negative Male for AMI  >=10 - Abnormal Female indicating possible myocardial injury.  >=15 - Abnormal Male indicating possible myocardial injury.   Clinicians would have to utilize clinical acumen, EKG, Troponin, and serial changes to determine if it is an Acute Myocardial Infarction or myocardial injury due to an underlying chronic condition.                Imaging:    CT Chest With Contrast Diagnostic    Result Date: 3/11/2023  PROCEDURE: CT CHEST W CONTRAST DIAGNOSTIC  COMPARISON:  [facility], CT, CHEST W/ CONTRAST, 5/24/2021, 20:53. INDICATIONS: SHORTNESS OF BREATH POST BACK SX 1-2 WEEKS AGO  TECHNIQUE: After obtaining the patient's consent, CT images were obtained with non-ionic intravenous contrast material.   PROTOCOL:   Standard imaging protocol performed    RADIATION:   DLP: 146mGy*cm   Automated exposure control was utilized to minimize radiation dose. CONTRAST: 60cc Omnipaque 300 I.V.  FINDINGS:  There is minor atelectasis in the left lung.  There is no pneumothorax, pleural effusion or focal airspace consolidation.  Airways are patent.  No suspicious lung nodules.  The thyroid, trachea and esophagus appear within normal limits.  Heart size is normal.  There are mild coronary artery calcifications.  There are aortic valvular calcifications.  No pericardial effusion or mediastinal lymphadenopathy.  No evidence of pulmonary embolism.  Limited images of the upper abdomen demonstrate no acute finding.  There is partially visualized thoracolumbar posterior fusion hardware in place.  There is an intrathecal catheter terminating at the T9 level.  Superficial soft tissues are unremarkable.  There are no acute osseous abnormalities or destructive bone lesions.  There is moderate left glenohumeral joint  degeneration.  There are bridging thoracic osteophytes.        1. No acute cardiopulmonary abnormality. 2. Coronary coronary artery and aortic valve calcifications.     LINDSEY ARNOLD MD       Electronically Signed and Approved By: LINDSEY ARNOLD MD on 3/11/2023 at 21:10             XR Chest 1 View    Result Date: 3/11/2023  PROCEDURE: XR CHEST 1 VW  COMPARISON: [facility], CR, CHEST AP/PA 1 VIEW, 5/24/2021, 18:15.  INDICATIONS: covid+, shortness of breath  FINDINGS:  Heart size and pulmonary vasculature within normal limits.  Lungs clear other than discoid atelectasis in the left base.  No suspicious infiltrate demonstrated.  Costophrenic angles sharp.  Thoracolumbar rods noted       No radiographic findings of pneumonia      TATO MORALES MD       Electronically Signed and Approved By: TATO MORALES MD on 3/11/2023 at 18:56                 Differential Diagnosis and Discussion:    Chest Pain:  Based on the patient's signs and symptoms, I considered aortic dissection, myocardial infaction, pulmonary embolism, cardiac tamponade, pericarditis, pneumothorax, musculoskeletal chest pain and other differential diagnosis as an etiology of the patient's chest pain.   Dyspnea: Differential diagnosis includes but is not limited to metabolic acidosis, neurological disorders, psychogenic, asthma, pneumothorax, upper airway obstruction, COPD, pneumonia, noncardiogenic pulmonary edema, interstitial lung disease, anemia, congestive heart failure, and pulmonary embolism    All labs were reviewed and interpreted by me.    MDM  Number of Diagnoses or Management Options  Acute bronchitis, unspecified organism  COVID-19  Diagnosis management comments: Patient was resting very comfortably at the time of discharge, no acute distress and nontoxic.  Patient's CBC was unremarkable with exception of mild anemia.  The patient's flu was negative.  Patient's CMP was unremarkable.  Patient's BNP was elevated at 1300.  The  patient's lactate was normal at 1.7.  Patient's initial troponin was 37.  Repeated at 2 hours it was 34.  The patient actually had a -3 delta..  Patient's CT scan of the chest demonstrates no acute cardiopulmonary disease the [facility]  The patient was reassessed.  Patient states that he feels much better as far as his breathing goes.  The patient denies any pain in his chest.  The patient is in no respiratory distress including no tachypnea, no accessory muscle use or costal retractions.  On physical exam the patient's sounds are significantly improved.  The patient's EKG demonstrated normal sinus rhythm with a rate of 78.  There is no acute abnormalities.  The patient was diagnosed with acute bronchitis and COVID-19.  The patient did meet Paxlovid criteria.  That was prescribed.  They do have a nebulizer at home.  The patient will use his wife's nebulizer every 4-6 hours as needed for shortness of breath.  The patient will follow-up with her doctor on Monday.  She was given very specific instructions on when and why to return to the emergency room.  The patient felt comfortable those instructions, comfortable for discharge and outpatient follow-up.  The patient's wife felt comfortable taking the patient home       Amount and/or Complexity of Data Reviewed  Clinical lab tests: reviewed  Tests in the radiology section of CPT®: reviewed  Tests in the medicine section of CPT®: reviewed         Social Determinants of Health:    Patient is independent, reliable, and has access to care.       Disposition and Care Coordination:    Discharged: The patient is suitable and stable for discharge with no need for consideration of observation or admission.    I have explained discharge medications and the need for follow up with the patient/caretakers. This was also printed in the discharge instructions. Patient was discharged with the following medications and follow up:       Medication List      New Prescriptions    Nirmatrelvir&Ritonavir 300/100 20 x 150 MG & 10 x 100MG tablet therapy pack tablet  Commonly known as: PAXLOVID  Take 3 tablets by mouth 2 (Two) Times a Day for 5 days.           Where to Get Your Medications      These medications were sent to [facility]/pharmacy #[id number] - [place], [place] - [address] - [phone] [facility] [phone] FX  [address] [place] [address]    Hours: 24-hours Phone: [phone]   · Nirmatrelvir&Ritonavir 300/100 20 x 150 MG & 10 x 100MG tablet therapy pack tablet      Vishal Jasso MD  [id number] [facility] [address]  [facility] [address] [phone]    On 3/13/2023  Acute bronchitis and COVID-19, call for appointment       Final diagnoses:   Acute bronchitis, unspecified organism   COVID-19        ED Disposition     ED Disposition   Discharge    Condition   Stable    Comment   --             This medical record created using voice recognition software.    Documentation assistance provided by Zulma Reyes acting as scribe for No att. providers found. Information recorded by the scribe was done at my direction and has been verified and validated by me.              Zulma Reyes  03/11/23 1816       Dante Garcia DO  03/12/23 0154

## 2023-03-12 LAB
QT INTERVAL: 383 MS
SARS-COV-2 RNA RESP QL NAA+PROBE: DETECTED

## 2023-03-12 NOTE — DISCHARGE INSTRUCTIONS
Please use your albuterol nebulizer every 4-6 hours as needed for shortness of breath    Return to the emergency room for increasing shortness of breath, chest pain, chest pressure, near passing out, passing out, altered mental status or any new symptoms you are concerned with

## 2023-03-16 LAB
BACTERIA SPEC AEROBE CULT: NORMAL
BACTERIA SPEC AEROBE CULT: NORMAL

## 2023-03-20 ENCOUNTER — HOSPITAL ENCOUNTER (EMERGENCY)
Facility: HOSPITAL | Age: 63
Discharge: HOME | End: 2023-03-21
Attending: EMERGENCY MEDICINE | Admitting: EMERGENCY MEDICINE
Payer: OTHER GOVERNMENT

## 2023-03-20 DIAGNOSIS — M54.50 ACUTE LEFT-SIDED LOW BACK PAIN WITHOUT SCIATICA: Primary | ICD-10-CM

## 2023-03-20 LAB
BACTERIA UR QL AUTO: ABNORMAL /HPF
BILIRUB UR QL STRIP: ABNORMAL
CLARITY UR: CLEAR
COD CRY URNS QL: ABNORMAL /HPF
COLOR UR: ABNORMAL
GLUCOSE UR STRIP-MCNC: NEGATIVE MG/DL
HGB UR QL STRIP.AUTO: NEGATIVE
HYALINE CASTS UR QL AUTO: ABNORMAL /LPF
KETONES UR QL STRIP: ABNORMAL
LEUKOCYTE ESTERASE UR QL STRIP.AUTO: ABNORMAL
NITRITE UR QL STRIP: NEGATIVE
PH UR STRIP.AUTO: <=5 [PH] (ref 5–8)
PROT UR QL STRIP: ABNORMAL
RBC # UR STRIP: ABNORMAL /HPF
REF LAB TEST METHOD: ABNORMAL
SP GR UR STRIP: >1.03 (ref 1–1.03)
SQUAMOUS #/AREA URNS HPF: ABNORMAL /HPF
UROBILINOGEN UR QL STRIP: ABNORMAL
WBC # UR STRIP: ABNORMAL /HPF

## 2023-03-20 PROCEDURE — 99283 EMERGENCY DEPT VISIT LOW MDM: CPT

## 2023-03-20 PROCEDURE — 96374 THER/PROPH/DIAG INJ IV PUSH: CPT

## 2023-03-20 PROCEDURE — 81001 URINALYSIS AUTO W/SCOPE: CPT | Performed by: EMERGENCY MEDICINE

## 2023-03-20 NOTE — ED PROVIDER NOTES
Time: 7:43 PM EDT  Date of encounter:  3/20/2023  Independent Historian/Clinical History and Information was obtained by:   Patient  Chief Complaint   Patient presents with   • Back Pain       History is limited by: N/A    History of Present Illness:  Patient is a 62 y.o. year old male who presents to the emergency department for evaluation of left lower back pain.  Patient had back surgery approximately 2 to 3 weeks ago.  Patient's wife states he has been doing good until the past 24 to 48 hours and then he developed the pain.   He denies any involuntary loss of bowel or bladder and no numbness and tingling to the groin area.  He rates his current pain is 8.5.  He describes no urinary symptoms and has no history of kidney stone patient uses a walker to ambulate since having his surgery. (RAJAN Gipson - PIT Provider).      [facility]    Patient Care Team  Primary Care Provider: Vishal Jasso MD    Past Medical History:     Allergies   Allergen Reactions   • Dilantin [Phenytoin] Rash   • Latex Rash     Past Medical History:   Diagnosis Date   • Congestive heart failure (HCC)    • Hypertension    • Injury of back      Past Surgical History:   Procedure Laterality Date   • BACK SURGERY     • CARDIAC CATHETERIZATION     • CARPAL TUNNEL RELEASE     • ORIF FOREARM FRACTURE     • PATELLA FRACTURE SURGERY     • REPLACEMENT TOTAL KNEE       History reviewed. No pertinent family history.    Home Medications:  Prior to Admission medications    Medication Sig Start Date End Date Taking? Authorizing Provider   aspirin 81 MG chewable tablet Chew 81 mg Daily.    Provider, MD Jr   bumetanide (BUMEX) 2 MG tablet Take 2 mg by mouth Daily.    Provider, MD Jr   cephalexin (KEFLEX) 500 MG capsule Take 1 capsule by mouth 2 (Two) Times a Day. 7/11/22   Johann Rubalcava PA-C   cetirizine (zyrTEC) 10 MG tablet Take 1 tablet by mouth Daily. 6/26/22   Tori Oseguera APRN   hydrocortisone 1 % ointment Apply 1 application  "topically to the appropriate area as directed 2 (Two) Times a Day. 6/26/22   Tori Oseguera APRN   METOPROLOL SUCCINATE PO Take  by mouth.    Provider, MD Jr   oxyCODONE-acetaminophen (PERCOCET) 5-325 MG per tablet Take 1 tablet by mouth Every 6 (Six) Hours As Needed.    Provider, MD Jr        Social History:   Social History     Tobacco Use   • Smoking status: Former     Packs/day: 0.50     Types: Cigarettes   • Smokeless tobacco: Never   Vaping Use   • Vaping Use: Never used   Substance Use Topics   • Alcohol use: Not Currently     Comment: social   • Drug use: Never         Review of Systems:  Review of Systems     Physical Exam:  /83   Pulse 75   Temp 97.4 °F (36.3 °C) (Oral)   Resp 20   Ht 158.8 cm (62.5\")   Wt 103 kg (226 lb 6.6 oz)   SpO2 96%   BMI 40.75 kg/m²     Physical Exam  Constitutional:       Appearance: Normal appearance.   HENT:      Head: Normocephalic and atraumatic.      Nose: Nose normal.      Mouth/Throat:      Mouth: Mucous membranes are moist.   Eyes:      Extraocular Movements: Extraocular movements intact.      Conjunctiva/sclera: Conjunctivae normal.      Pupils: Pupils are equal, round, and reactive to light.   Cardiovascular:      Rate and Rhythm: Normal rate and regular rhythm.      Pulses: Normal pulses.      Heart sounds: Normal heart sounds.   Pulmonary:      Effort: Pulmonary effort is normal.      Breath sounds: Normal breath sounds.   Abdominal:      General: There is no distension.      Palpations: Abdomen is soft.      Tenderness: There is no abdominal tenderness.   Musculoskeletal:         General: Normal range of motion.      Cervical back: Normal range of motion.      Comments: No midline c,t,l spine tenderness. Mild tenderness over left lower para spinal muscle   Skin:     General: Skin is warm and dry.      Capillary Refill: Capillary refill takes less than 2 seconds.   Neurological:      General: No focal deficit present.      Mental Status: " He is alert and oriented to person, place, and time. Mental status is at baseline.   Psychiatric:         Mood and Affect: Mood normal.         Behavior: Behavior normal.                  Procedures:  Procedures      Medical Decision Making:      Comorbidities that affect care:    Chronic back pain, Congestive Heart Failure, Hypertension    External Notes reviewed:    Hospital Discharge Summary: Patient was admitted about a year ago for acute hypoxic respiratory failure      The following orders were placed and all results were independently analyzed by me:  Orders Placed This Encounter   Procedures   • CT Abdomen Pelvis With Contrast   • Urinalysis With Microscopic If Indicated (No Culture) - Urine, Clean Catch   • Urinalysis, Microscopic Only - Urine, Clean Catch   • Comprehensive Metabolic Panel   • CBC Auto Differential   • CBC & Differential       Medications Given in the Emergency Department:  Medications   HYDROmorphone (DILAUDID) injection 1 mg (1 mg Intravenous Given 3/21/23 0134)   iopamidol (ISOVUE-370) 76 % injection 100 mL (92 mL Intravenous Given 3/21/23 0226)        ED Course:    The patient was initially evaluated in the triage area where orders were placed. The patient was later dispositioned by Kirt Mills MD.      The patient was advised to stay for completion of workup which includes but is not limited to communication of labs and radiological results, reassessment and plan. The patient was advised that leaving prior to disposition by a provider could result in critical findings that are not communicated to the patient.     ED Course as of 03/21/23 0459   Mon Mar 20, 2023   1941   --- PROVIDER IN TRIAGE NOTE ---    Patient was seen and evaluated in triage by RAJAN Cast.  Orders were written and the patient is currently awaiting disposition.   [MS]      ED Course User Index  [MS] Alaina العلي APRN       Labs:    Lab Results (last 24 hours)     Procedure Component  Value Units Date/Time    Urinalysis With Microscopic If Indicated (No Culture) - Urine, Clean Catch [[id number]]  (Abnormal) Collected: 03/20/23 2040    Specimen: Urine, Clean Catch Updated: 03/20/23 2103     Color, UA Dark Yellow     Appearance, UA Clear     pH, UA <=5.0     Specific Gravity, UA >1.030     Glucose, UA Negative     Ketones, UA 40 mg/dL (2+)     Bilirubin, UA Moderate (2+)     Blood, UA Negative     Protein, UA Trace     Leuk Esterase, UA Trace     Nitrite, UA Negative     Urobilinogen, UA 1.0 E.U./dL    Urinalysis, Microscopic Only - Urine, Clean Catch [[id number]]  (Abnormal) Collected: 03/20/23 2040    Specimen: Urine, Clean Catch Updated: 03/20/23 2117     RBC, UA None Seen /HPF      WBC, UA 0-2 /HPF      Bacteria, UA None Seen /HPF      Squamous Epithelial Cells, UA 0-2 /HPF      Hyaline Casts, UA None Seen /LPF      Calcium Oxalate Crystals, UA Large/3+ /HPF      Methodology Manual Light Microscopy    CBC & Differential [[id number]]  (Normal) Collected: 03/21/23 0134    Specimen: Blood Updated: 03/21/23 0142    Narrative:      The following orders were created for panel order CBC & Differential.  Procedure                               Abnormality         Status                     ---------                               -----------         ------                     CBC Auto Differential[[id number]]        Normal              Final result                 Please view results for these tests on the individual orders.    Comprehensive Metabolic Panel [[id number]]  (Abnormal) Collected: 03/21/23 0134    Specimen: Blood Updated: 03/21/23 0205     Glucose 72 mg/dL      BUN 14 mg/dL      Creatinine 0.90 mg/dL      Sodium 138 mmol/L      Potassium 4.4 mmol/L      Chloride 102 mmol/L      CO2 24.0 mmol/L      Calcium 8.9 mg/dL      Total Protein 6.6 g/dL      Albumin 3.8 g/dL      ALT (SGPT) 37 U/L      AST (SGOT) 41 U/L      Alkaline Phosphatase 100 U/L      Total Bilirubin 0.9 mg/dL      Globulin 2.8  gm/dL      A/G Ratio 1.4 g/dL      BUN/Creatinine Ratio 15.6     Anion Gap 12.0 mmol/L      eGFR 96.6 mL/min/1.73     Narrative:      GFR Normal >60  Chronic Kidney Disease <60  Kidney Failure <15      CBC Auto Differential [[id number]]  (Normal) Collected: 03/21/23 0134    Specimen: Blood Updated: 03/21/23 0142     WBC 6.00 10*3/mm3      RBC 4.19 10*6/mm3      Hemoglobin 13.4 g/dL      Hematocrit 39.2 %      MCV 93.6 fL      MCH 32.0 pg      MCHC 34.2 g/dL      RDW 13.2 %      RDW-SD 44.3 fl      MPV 9.9 fL      Platelets 274 10*3/mm3      Neutrophil % 63.1 %      Lymphocyte % 24.5 %      Monocyte % 8.2 %      Eosinophil % 3.5 %      Basophil % 0.2 %      Immature Grans % 0.5 %      Neutrophils, Absolute 3.79 10*3/mm3      Lymphocytes, Absolute 1.47 10*3/mm3      Monocytes, Absolute 0.49 10*3/mm3      Eosinophils, Absolute 0.21 10*3/mm3      Basophils, Absolute 0.01 10*3/mm3      Immature Grans, Absolute 0.03 10*3/mm3      nRBC 0.0 /100 WBC            Imaging:    CT Abdomen Pelvis With Contrast    Result Date: 3/21/2023  PROCEDURE: CT ABDOMEN PELVIS W CONTRAST  COMPARISONS: 3/11/2023; 5/24/2021 (both chest CT/CTA studies).  INDICATIONS: LEFT LATERAL ABD. & LEFT FLANK PAIN (X 2-3 DAYS).  TECHNIQUE: After obtaining the patient's consent, 799 CT images were created with non-ionic intravenous contrast material.  No oral contrast agent was administered for the study.  PROTOCOL:   Standard CT imaging protocol performed.    RADIATION:   Showed DLP: 2033.3 mGy*cm   Automated exposure control was utilized to minimize radiation dose. CONTRAST: 92 mL Isovue 370 I.V. LABS:   eGFR: 62 mL/min/1.73m^2  FINDINGS: Posterior fusion instrumentation is in place from approximately T10 to S1 with associated streak artifact, obscuring detail on the study (especially involving the kidneys and the bilateral paraspinal regions).  Interbody prostheses are seen at L5-S1 with associated streak artifact, as well.  Grossly, the spinal fusion  hardware is thought to be intact with near-anatomic alignment.  Disc and endplate degenerative changes are seen, greatest at L1-2 with associated chronic retrolisthesis suspected, estimated at 5 mm.  The disc and endplate changes at this level are thought to be due to degenerative disease.  Please correlate clinically with any known history of prior infectious spondylodiscitis, especially at this level.  There is an intrathecal pain pump delivery catheter in place, as before.  Its cranial-most extent is not included in the field of view.  It has been seen on prior chest CT exams.  Gas foci are seen in the posterior paraspinal regions, such as on image 5 of series 201, especially at the T9-10 level on the right, and may be related to a recent procedure.  Please correlate clinically.  No gross evidence of a paraspinal phlegmon or abscess.  Spinal MRI examination may be more sensitive in detection of such findings but would likely be degraded by considerable susceptibility artifact (given the extensive metallic fusion hardware in place).  There are postoperative changes of the bilateral posterior iliac crests.  Degenerative changes involve the sacroiliac joints and the imaged spine.  There is extensive atherosclerotic change, including involvement of the coronary arteries.  Aortic valve calcifications are suggested.  No cardiac enlargement.  A trace amount of pericardial effusion may be present.  In the differential diagnosis would be pericardial thickening.  No definite pleural effusion is suggested.  Atelectasis and/or fibrosis may involve the lung bases.  No definite acute infiltrate is seen.  There is diffuse hepatic steatosis.  No hepatomegaly.  Probably no splenomegaly.  A small hiatal hernia is suspected.  There is mild nonspecific nodularity of the bilateral adrenal glands, which been seen on prior chest CT studies.  The gallbladder is distended.  No gallstones are seen.  No definite CT evidence of acute  cholecystitis.  No definite biliary ductal dilatation.  No acute pancreatitis.  Please correlate with pertinent lab values.  There may be tiny renal cysts.  No enhanced CT evidence for nephrolithiasis or ureterolithiasis.  No gross enhanced CT evidence for acute pyelonephritis.  No urinary bladder calculi are seen.  The urinary bladder is underdistended.  Probably no prostatomegaly.  There are small bilateral fat-containing inguinal hernias.  They do not contain bowel.  No acute intraperitoneal or retroperitoneal hemorrhage.  There are areas of increased attenuation involving the bilateral lower anterior chest wall, such as seen on images 1 through 22 of series 201, new or increased since the 3/11/2023 study.  Please correlate clinically.  The findings may represent contusion.  Cellulitis would be among differential considerations.  No mechanical bowel obstruction is seen.  There are colonic diverticula.  No acute diverticulitis.  Constipation is possible.  No acute colitis or appendicitis.  No pneumoperitoneum or pneumatosis.  No portal or mesenteric venous gas.        1. Nonspecific areas of increased attenuation involving the anterior lower chest wall (right greater than left) are noted.  No definite focal fluid collection or ectopic gas collection is seen in these regions.  Please correlate clinically.  Cellulitis or contusion would be among differential considerations.  2. Ectopic gas foci are seen in the posterior paraspinal regions at the lower thoracic levels (such as at about T9-10).  Please correlate with regard to a recent procedure at this level.  3. No definite acute pyelonephritis.  No hydronephrosis.  No obstructive uropathy is seen due to ureterolithiasis.  No nephrolithiasis is suggested by enhanced CT.  4. No other definite acute findings are appreciated.  5. There is streak artifact on the study, obscuring detail.  6. Please see above comments for further detail.   1.   Please note that portions  of this note were completed with a voice recognition program.  OMAR MORE JR, MD       Electronically Signed and Approved By: OMAR MORE JR, MD on 3/21/2023 at 3:30                  Differential Diagnosis and Discussion:      Back Pain: The patient presents with back pain. My differential diagnosis includes but is not limited to acute spinal epidural abscess, acute spinal epidural bleed, cauda equina syndrome, abdominal aortic aneurysm, aortic dissection, kidney stone, pyelonephritis, musculoskeletal back pain, spinal fracture, and osteoarthritis.     All labs were reviewed and interpreted by me.  CT scan radiology interpretation was reviewed by me.    MDM  Number of Diagnoses or Management Options  Acute left-sided low back pain without sciatica  Diagnosis management comments: Patient presented to the emergency department with left-sided back pain.  He denies any midline tenderness.  Patient had back surgery 2-3 weeks ago.  He reports pain started getting worse 2 days ago.  He denies any loss of bowel/bladder control.  He denies any injuries.  No focal neurological deficits.  No urinary issues.  Labs show no significant abnormality.  CT was obtained that showed nonspecific areas of attenuation involving the anterior lower chest wall.  Nothing seen on exam.  Patient also have ectopic foci of gas in first-year paraspinal region likely due to recent surgery.  Patient given pain medication.  On reevaluation reports improvement in symptoms.  Recommend that he calls his surgeon first thing in the morning to schedule close follow-up.  Discussed return precautions, discharge instructions and answered all his questions.       Amount and/or Complexity of Data Reviewed  Clinical lab tests: reviewed  Tests in the radiology section of CPT®: reviewed  Review and summarize past medical records: yes  Independent visualization of images, tracings, or specimens: yes    Risk of Complications, Morbidity, and/or  Mortality  Presenting problems: moderate  Management options: moderate             Patient Care Considerations:    NARCOTICS: I considered prescribing opiate pain medication as an outpatient, however patient has contract with his pain management doctor      Consultants/Shared Management Plan:    None    Social Determinants of Health:    Patient is independent, reliable, and has access to care.       Disposition and Care Coordination:    Discharged: I considered escalation of care by admitting this patient for observation, however the patient has improved and is suitable and  stable for discharge.    I have explained the patient´s condition, diagnoses and treatment plan based on the information available to me at this time. I have answered questions and addressed any concerns. The patient has a good  understanding of the patient´s diagnosis, condition, and treatment plan as can be expected at this point. The vital signs have been stable. The patient´s condition is stable and appropriate for discharge from the emergency department.      The patient will pursue further outpatient evaluation with the primary care physician or other designated or consulting physician as outlined in the discharge instructions. They are agreeable to this plan of care and follow-up instructions have been explained in detail. The patient has received these instructions in written format and have expressed an understanding of the discharge instructions. The patient is aware that any significant change in condition or worsening of symptoms should prompt an immediate return to this or the closest emergency department or call to 911.  I have explained discharge medications and the need for follow up with the patient/caretakers. This was also printed in the discharge instructions. Patient was discharged with the following medications and follow up:      Medication List      No changes were made to your prescriptions during this visit.       Vishal Jasso MD  [id number] [address] [address]  [phone]    In 2 days         Final diagnoses:   Acute left-sided low back pain without sciatica        ED Disposition     ED Disposition   Discharge    Condition   Stable    Comment   --             This medical record created using voice recognition software.           Kirt Mills MD  03/21/23 3833

## 2023-03-21 ENCOUNTER — APPOINTMENT (OUTPATIENT)
Dept: CT IMAGING | Facility: HOSPITAL | Age: 63
End: 2023-03-21
Payer: OTHER GOVERNMENT

## 2023-03-21 VITALS
SYSTOLIC BLOOD PRESSURE: 167 MMHG | OXYGEN SATURATION: 96 % | TEMPERATURE: 97.4 F | RESPIRATION RATE: 20 BRPM | BODY MASS INDEX: 40.12 KG/M2 | WEIGHT: 226.41 LBS | HEIGHT: 63 IN | HEART RATE: 75 BPM | DIASTOLIC BLOOD PRESSURE: 83 MMHG

## 2023-03-21 LAB
ALBUMIN SERPL-MCNC: 3.8 G/DL (ref 3.5–5.2)
ALBUMIN/GLOB SERPL: 1.4 G/DL
ALP SERPL-CCNC: 100 U/L (ref 39–117)
ALT SERPL W P-5'-P-CCNC: 37 U/L (ref 1–41)
ANION GAP SERPL CALCULATED.3IONS-SCNC: 12 MMOL/L (ref 5–15)
AST SERPL-CCNC: 41 U/L (ref 1–40)
BASOPHILS # BLD AUTO: 0.01 10*3/MM3 (ref 0–0.2)
BASOPHILS NFR BLD AUTO: 0.2 % (ref 0–1.5)
BILIRUB SERPL-MCNC: 0.9 MG/DL (ref 0–1.2)
BUN SERPL-MCNC: 14 MG/DL (ref 8–23)
BUN/CREAT SERPL: 15.6 (ref 7–25)
CALCIUM SPEC-SCNC: 8.9 MG/DL (ref 8.6–10.5)
CHLORIDE SERPL-SCNC: 102 MMOL/L (ref 98–107)
CO2 SERPL-SCNC: 24 MMOL/L (ref 22–29)
CREAT SERPL-MCNC: 0.9 MG/DL (ref 0.76–1.27)
DEPRECATED RDW RBC AUTO: 44.3 FL (ref 37–54)
EGFRCR SERPLBLD CKD-EPI 2021: 96.6 ML/MIN/1.73
EOSINOPHIL # BLD AUTO: 0.21 10*3/MM3 (ref 0–0.4)
EOSINOPHIL NFR BLD AUTO: 3.5 % (ref 0.3–6.2)
ERYTHROCYTE [DISTWIDTH] IN BLOOD BY AUTOMATED COUNT: 13.2 % (ref 12.3–15.4)
GLOBULIN UR ELPH-MCNC: 2.8 GM/DL
GLUCOSE SERPL-MCNC: 72 MG/DL (ref 65–99)
HCT VFR BLD AUTO: 39.2 % (ref 37.5–51)
HGB BLD-MCNC: 13.4 G/DL (ref 13–17.7)
IMM GRANULOCYTES # BLD AUTO: 0.03 10*3/MM3 (ref 0–0.05)
IMM GRANULOCYTES NFR BLD AUTO: 0.5 % (ref 0–0.5)
LYMPHOCYTES # BLD AUTO: 1.47 10*3/MM3 (ref 0.7–3.1)
LYMPHOCYTES NFR BLD AUTO: 24.5 % (ref 19.6–45.3)
MCH RBC QN AUTO: 32 PG (ref 26.6–33)
MCHC RBC AUTO-ENTMCNC: 34.2 G/DL (ref 31.5–35.7)
MCV RBC AUTO: 93.6 FL (ref 79–97)
MONOCYTES # BLD AUTO: 0.49 10*3/MM3 (ref 0.1–0.9)
MONOCYTES NFR BLD AUTO: 8.2 % (ref 5–12)
NEUTROPHILS NFR BLD AUTO: 3.79 10*3/MM3 (ref 1.7–7)
NEUTROPHILS NFR BLD AUTO: 63.1 % (ref 42.7–76)
NRBC BLD AUTO-RTO: 0 /100 WBC (ref 0–0.2)
PLATELET # BLD AUTO: 274 10*3/MM3 (ref 140–450)
PMV BLD AUTO: 9.9 FL (ref 6–12)
POTASSIUM SERPL-SCNC: 4.4 MMOL/L (ref 3.5–5.2)
PROT SERPL-MCNC: 6.6 G/DL (ref 6–8.5)
RBC # BLD AUTO: 4.19 10*6/MM3 (ref 4.14–5.8)
SODIUM SERPL-SCNC: 138 MMOL/L (ref 136–145)
WBC NRBC COR # BLD: 6 10*3/MM3 (ref 3.4–10.8)

## 2023-03-21 PROCEDURE — 85025 COMPLETE CBC W/AUTO DIFF WBC: CPT | Performed by: EMERGENCY MEDICINE

## 2023-03-21 PROCEDURE — 96374 THER/PROPH/DIAG INJ IV PUSH: CPT

## 2023-03-21 PROCEDURE — 74177 CT ABD & PELVIS W/CONTRAST: CPT

## 2023-03-21 PROCEDURE — 25010000002 HYDROMORPHONE 1 MG/ML SOLUTION: Performed by: EMERGENCY MEDICINE

## 2023-03-21 PROCEDURE — 80053 COMPREHEN METABOLIC PANEL: CPT | Performed by: EMERGENCY MEDICINE

## 2023-03-21 PROCEDURE — 25510000001 IOPAMIDOL PER 1 ML: Performed by: EMERGENCY MEDICINE

## 2023-03-21 RX ORDER — HYDROCODONE BITARTRATE AND ACETAMINOPHEN 7.5; 325 MG/1; MG/1
1 TABLET ORAL EVERY 6 HOURS PRN
Qty: 10 TABLET | Refills: 0 | Status: SHIPPED | OUTPATIENT
Start: 2023-03-21 | End: 2023-03-21

## 2023-03-21 RX ADMIN — IOPAMIDOL 92 ML: 755 INJECTION, SOLUTION INTRAVENOUS at 02:26

## 2023-03-21 RX ADMIN — HYDROMORPHONE HYDROCHLORIDE 1 MG: 1 INJECTION, SOLUTION INTRAMUSCULAR; INTRAVENOUS; SUBCUTANEOUS at 01:34

## 2023-04-19 ENCOUNTER — TRANSCRIBE ORDERS (OUTPATIENT)
Dept: PHYSICAL THERAPY | Facility: CLINIC | Age: 63
End: 2023-04-19
Payer: OTHER GOVERNMENT

## 2023-04-19 DIAGNOSIS — Z98.1 S/P LUMBAR FUSION: Primary | ICD-10-CM

## 2023-05-22 ENCOUNTER — TREATMENT (OUTPATIENT)
Dept: PHYSICAL THERAPY | Facility: CLINIC | Age: 63
End: 2023-05-22
Payer: OTHER GOVERNMENT

## 2023-05-22 DIAGNOSIS — M25.60 JOINT STIFFNESS OF SPINE: ICD-10-CM

## 2023-05-22 DIAGNOSIS — Z98.1 S/P LUMBAR SPINAL FUSION: Primary | ICD-10-CM

## 2023-05-22 DIAGNOSIS — R26.2 DIFFICULTY WALKING: ICD-10-CM

## 2023-05-22 NOTE — PROGRESS NOTES
Physical Therapy Initial Evaluation and Plan of Care                    [facility] [address] [place] [address]    Patient: Radames Quinteros   : 1960  Diagnosis/ICD-10 Code:  S/P lumbar spinal fusion [Z98.1]  Referring practitioner: London Pichardo MD  Date of Initial Visit: 2023  Today's Date: 2023  Patient seen for 1 sessions           Subjective Questionnaire: Oswestry: 2045 = 44.4% Disability      Subjective Evaluation    History of Present Illness  Mechanism of injury: The patient presents to physical therapy s/p spinal fusion from T10-L4 on 23. He was in the hospital for 3 days after surgery and then discharged to home. He has been in home health PT from then until transitioning to outpatient PT today. His pain today is located mainly in his mid and lower back. His pain hasn't really improved since surgery. He does have intermittent radicular pains into both of his legs when he stands/walks for too long. The sensation in his legs is better since surgery. His legs still feel weak at this point. He denies any loss of bowel or bladder function. He was taking 7.5mg Oxycodone until about a week ago. He is now down to the 5mg Oxycodone and his back is definitely hurting worse. His pain is improved some with a heating pad and with sitting. He currently uses a cane sometimes for ambulation, but not all the time. He feels like it gives his back better support. He is not currently on any specific restrictions.      Patient Occupation: Not currently working Pain  Current pain ratin  At best pain ratin  At worst pain ratin    Patient Goals  Patient goal: The patient would like to have less pain, improved mobility, and be able to perform ADLs that require standing/walking.           Objective          Postural Observations    Additional Postural Observation Details  Rounded shoulders, increased thoracic kyphosis    Tenderness     Additional Tenderness  Details  Tenderness bilaterally in thoracic paraspinals, lumbar paraspinals, and SI joints    Neurological Testing     Additional Neurological Details  Sensation to light touch intact and equal bilaterally from L2-S1 dermatomal level.    Seated slump: (-) bilaterally for increased sciatic neural tension.    Active Range of Motion     Lumbar   Left rotation: Active left lumbar rotation: 25% with pain  Right rotation: Active right lumbar rotation: 25% with pain    Strength/Myotome Testing     Left Hip   Planes of Motion   Flexion: 4-  Abduction: 4  Adduction: 4+    Right Hip   Planes of Motion   Flexion: 4-  Abduction: 4+  Adduction: 4+    Left Knee   Flexion: 4  Extension: 4-    Right Knee   Flexion: 4  Extension: 4-    Left Ankle/Foot   Dorsiflexion: 4    Right Ankle/Foot   Dorsiflexion: 4    Additional Strength Details  All strength testing performed in the seated position today.    Ambulation     Ambulation: Level Surfaces     Additional Level Surfaces Ambulation Details  The patient ambulates with a single tipped cane in his right upper extremity with wide base of support, short step length bilaterally, and decreased stance time on the left lower extremity and toeing out of the left lower extremity.    He is able to ambulate without a cane, but demonstrates worsening of forward flexed posture.     Functional Assessment     Comments  30'' Sit to stand test: 4 reps without use of hands    2 minute walk test: 280 feet with a single tipped cane in his right hand      See Exercise, Manual, and Modality Logs for complete treatment.     Assessment & Plan     Assessment  Impairments: abnormal gait, abnormal muscle firing, abnormal muscle tone, abnormal or restricted ROM, activity intolerance, impaired physical strength, lacks appropriate home exercise program, pain with function and safety issue  Functional Limitations: carrying objects, lifting, sleeping, walking, pulling, pushing, uncomfortable because of pain, moving  in bed, sitting, standing, stooping and unable to perform repetitive tasks  Assessment details: The patient presents to physical therapy s/p spinal fusion from T10-L4 on 2/28/23. He presents with mid and low back pain with associated lumbar stiffness, lower extremity weakness, and functional deficits (YURY). He would benefit from skilled physical therapy as described below to address these limitations and allow the patient to return to his prior level of function.  Prognosis: good    Goals  Plan Goals: LOW BACK PROBLEMS:    1. The patient complains of low back pain.   LTG 1: 12 weeks:  The patient will report a pain rating of 3/10 or better in order to improve  tolerance to activities of daily living and improve sleep quality.    STATUS:  New   STG 1a: 6 weeks:  The patient will report a pain rating of 5/10 or better.    STATUS:  New    2. The patient demonstrates weakness of the bilateral hips.   LTG 2: 12 weeks:  The patient will demonstrate 4+ /5 strength for bilateral hip flexion, abduction,  and adduction in order to improve hip stability.    STATUS:  New   STG 2a: 6 weeks:  The patient will demonstrate 4 /5 strength for bilateral hip flexion, abduction,  and adduction.    STATUS:  New    3. The patient has gait dysfunction.   LTG 3: 12 weeks:  The patient will ambulate without assistive device, independently, for   community distances with minimal limp to the bilateral lower extremities in order to improve mobility and allow   patient to perform activities such as grocery shopping with greater ease.    STATUS:  New    4. The patient has limited lumbar AROM   LTG 4: 12 weeks:  The patient will demonstrate lumbar AROM as follows: bilateral rotation to 50% or greater.    STATUS:  New    5. Mobility: Walking/Moving Around Functional Limitation     LTG 5: 12 weeks:  The patient will demonstrate 22.2 % limitation by achieving a score of 10/45 on the YURY.    STATUS:  New   STG 5 a: 6 weeks:  The patient will  demonstrate 33.3 % limitation by achieving a score of 15/45 on the YURY.      STATUS:  New     6. The patient has limited ability to safely perform community ambulation based on 2 minute walk test time.              LTG 6: 12 weeks:  The patient will demonstrate improved tolerance to community ambulation by walking 350 feet or greater on the 2 minute walk test.                          STATUS:  New              STG 6a: 6 weeks:  The patient will demonstrate improved tolerance to community ambulation by walking 300 feet or greater on the 2 minute walk test.                          STATUS:  New    7. The patient is at an increased risk for falls based on reps completed in the 30 second sit to stand test.              LTG 7: 12 weeks:  The patient will demonstrate decreased risk for falls by completing the 10 reps or more in the 30 second sit to stand test.                          STATUS:  New              STG 7a: 6 weeks:  The patient will demonstrate decreased risk for falls by completing the 6 reps or more in the 30 second sit to stand test.                          STATUS:  New    Plan  Therapy options: will be seen for skilled therapy services  Planned modality interventions: cryotherapy, electrical stimulation/Russian stimulation, TENS, dry needling and traction  Planned therapy interventions: balance/weight-bearing training, ADL retraining, soft tissue mobilization, strengthening, stretching, therapeutic activities, joint mobilization, home exercise program, functional ROM exercises, flexibility, body mechanics training, postural training, neuromuscular re-education, manual therapy, abdominal trunk stabilization, IADL retraining and spinal/joint mobilization  Frequency: 2x week  Duration in weeks: 12  Treatment plan discussed with: patient        Visit Diagnoses:    ICD-10-CM ICD-9-CM   1. S/P lumbar spinal fusion  Z98.1 V45.4   2. Joint stiffness of spine  M25.60 719.58   3. Difficulty walking  R26.2 719.7        History # of Personal Factors and/or Comorbidities: MODERATE (1-2)  Examination of Body System(s): # of elements: LOW (1-2)  Clinical Presentation: STABLE   Clinical Decision Making: LOW       Timed:         Manual Therapy:    0     mins  03452;     Therapeutic Exercise:    0     mins  86574;     Neuromuscular Ann:    0    mins  53171;    Therapeutic Activity:     0     mins  99092;     Gait Trainin     mins  05089;     Ultrasound:     0     mins  12850;    Ionto                               0    mins   39429  Self Care                       0     mins   59954  Canalith Repos    0     mins 13929      Un-Timed:  Electrical Stimulation:    0     mins  07917 ( );  Dry Needling     0     mins self-pay  Traction     0     mins 98625  Low Eval     40     Mins  74876  Mod Eval     0     Mins  29658  High Eval                       0     Mins  89817  Re-Eval                           0    mins  03575    Timed Treatment:   0   mins   Total Treatment:     40   mins    PT SIGNATURE: Casey Lomeli PT    Electronically signed 2023    [place] License: PT - [id number]      Initial Certification  Certification Period: 2023 thru 2023  I certify that the therapy services are furnished while this patient is under my care.  The services outlined above are required by this patient, and will be reviewed every 90 days.     PHYSICIAN: London Pichardo MD  NPI: [id number]      DATE:     Please sign and return via fax to [phone]. Thank you, [facility].

## 2023-08-14 ENCOUNTER — LAB (OUTPATIENT)
Dept: LAB | Facility: HOSPITAL | Age: 63
End: 2023-08-14
Payer: OTHER GOVERNMENT

## 2023-08-14 ENCOUNTER — TRANSCRIBE ORDERS (OUTPATIENT)
Dept: ADMINISTRATIVE | Facility: HOSPITAL | Age: 63
End: 2023-08-14
Payer: OTHER GOVERNMENT

## 2023-08-14 DIAGNOSIS — L20.89 FLEXURAL ATOPIC DERMATITIS: ICD-10-CM

## 2023-08-14 DIAGNOSIS — L20.89 FLEXURAL ATOPIC DERMATITIS: Primary | ICD-10-CM

## 2023-08-14 LAB
ALBUMIN SERPL-MCNC: 4.5 G/DL (ref 3.5–5.2)
ALBUMIN/GLOB SERPL: 1.8 G/DL
ALP SERPL-CCNC: 76 U/L (ref 39–117)
ALT SERPL W P-5'-P-CCNC: 16 U/L (ref 1–41)
ANION GAP SERPL CALCULATED.3IONS-SCNC: 10.9 MMOL/L (ref 5–15)
AST SERPL-CCNC: 23 U/L (ref 1–40)
BASOPHILS # BLD AUTO: 0.08 10*3/MM3 (ref 0–0.2)
BASOPHILS NFR BLD AUTO: 1.2 % (ref 0–1.5)
BILIRUB SERPL-MCNC: 0.6 MG/DL (ref 0–1.2)
BUN SERPL-MCNC: 16 MG/DL (ref 8–23)
BUN/CREAT SERPL: 14.5 (ref 7–25)
CALCIUM SPEC-SCNC: 9.4 MG/DL (ref 8.6–10.5)
CHLORIDE SERPL-SCNC: 103 MMOL/L (ref 98–107)
CO2 SERPL-SCNC: 26.1 MMOL/L (ref 22–29)
CREAT SERPL-MCNC: 1.1 MG/DL (ref 0.76–1.27)
DEPRECATED RDW RBC AUTO: 47.4 FL (ref 37–54)
EGFRCR SERPLBLD CKD-EPI 2021: 75.9 ML/MIN/1.73
EOSINOPHIL # BLD AUTO: 0.06 10*3/MM3 (ref 0–0.4)
EOSINOPHIL NFR BLD AUTO: 0.9 % (ref 0.3–6.2)
ERYTHROCYTE [DISTWIDTH] IN BLOOD BY AUTOMATED COUNT: 14.4 % (ref 12.3–15.4)
GLOBULIN UR ELPH-MCNC: 2.5 GM/DL
GLUCOSE SERPL-MCNC: 97 MG/DL (ref 65–99)
HCT VFR BLD AUTO: 42.9 % (ref 37.5–51)
HGB BLD-MCNC: 14.6 G/DL (ref 13–17.7)
IMM GRANULOCYTES # BLD AUTO: 0.01 10*3/MM3 (ref 0–0.05)
IMM GRANULOCYTES NFR BLD AUTO: 0.1 % (ref 0–0.5)
LYMPHOCYTES # BLD AUTO: 1.08 10*3/MM3 (ref 0.7–3.1)
LYMPHOCYTES NFR BLD AUTO: 15.6 % (ref 19.6–45.3)
MCH RBC QN AUTO: 30.7 PG (ref 26.6–33)
MCHC RBC AUTO-ENTMCNC: 34 G/DL (ref 31.5–35.7)
MCV RBC AUTO: 90.1 FL (ref 79–97)
MONOCYTES # BLD AUTO: 0.42 10*3/MM3 (ref 0.1–0.9)
MONOCYTES NFR BLD AUTO: 6.1 % (ref 5–12)
NEUTROPHILS NFR BLD AUTO: 5.28 10*3/MM3 (ref 1.7–7)
NEUTROPHILS NFR BLD AUTO: 76.1 % (ref 42.7–76)
NRBC BLD AUTO-RTO: 0 /100 WBC (ref 0–0.2)
PLATELET # BLD AUTO: 230 10*3/MM3 (ref 140–450)
PMV BLD AUTO: 11.7 FL (ref 6–12)
POTASSIUM SERPL-SCNC: 4.8 MMOL/L (ref 3.5–5.2)
PROT SERPL-MCNC: 7 G/DL (ref 6–8.5)
RBC # BLD AUTO: 4.76 10*6/MM3 (ref 4.14–5.8)
SODIUM SERPL-SCNC: 140 MMOL/L (ref 136–145)
WBC NRBC COR # BLD: 6.93 10*3/MM3 (ref 3.4–10.8)

## 2023-08-14 PROCEDURE — 36415 COLL VENOUS BLD VENIPUNCTURE: CPT

## 2023-08-14 PROCEDURE — 80053 COMPREHEN METABOLIC PANEL: CPT

## 2023-08-14 PROCEDURE — 85025 COMPLETE CBC W/AUTO DIFF WBC: CPT

## 2023-10-20 ENCOUNTER — HOSPITAL ENCOUNTER (EMERGENCY)
Facility: HOSPITAL | Age: 63
Discharge: HOME | End: 2023-10-20
Attending: EMERGENCY MEDICINE
Payer: OTHER GOVERNMENT

## 2023-10-20 ENCOUNTER — APPOINTMENT (OUTPATIENT)
Dept: GENERAL RADIOLOGY | Facility: HOSPITAL | Age: 63
End: 2023-10-20
Payer: OTHER GOVERNMENT

## 2023-10-20 VITALS
WEIGHT: 223.55 LBS | TEMPERATURE: 98.4 F | SYSTOLIC BLOOD PRESSURE: 165 MMHG | RESPIRATION RATE: 18 BRPM | BODY MASS INDEX: 41.14 KG/M2 | DIASTOLIC BLOOD PRESSURE: 99 MMHG | OXYGEN SATURATION: 98 % | HEIGHT: 62 IN | HEART RATE: 73 BPM

## 2023-10-20 DIAGNOSIS — M54.12 CERVICAL RADICULOPATHY: Primary | ICD-10-CM

## 2023-10-20 PROCEDURE — 72050 X-RAY EXAM NECK SPINE 4/5VWS: CPT

## 2023-10-20 PROCEDURE — 72072 X-RAY EXAM THORAC SPINE 3VWS: CPT

## 2023-10-20 PROCEDURE — 99282 EMERGENCY DEPT VISIT SF MDM: CPT

## 2023-10-20 PROCEDURE — 97161 PT EVAL LOW COMPLEX 20 MIN: CPT | Performed by: PHYSICAL THERAPIST

## 2023-10-20 PROCEDURE — 97110 THERAPEUTIC EXERCISES: CPT | Performed by: PHYSICAL THERAPIST

## 2023-10-20 RX ORDER — METHYLPREDNISOLONE 4 MG/1
TABLET ORAL
Qty: 21 TABLET | Refills: 0 | Status: SHIPPED | OUTPATIENT
Start: 2023-10-20

## 2023-10-20 RX ORDER — METHOCARBAMOL 750 MG/1
750 TABLET, FILM COATED ORAL 3 TIMES DAILY PRN
Qty: 15 TABLET | Refills: 0 | Status: SHIPPED | OUTPATIENT
Start: 2023-10-20 | End: 2023-10-25

## 2023-10-20 NOTE — ED PROVIDER NOTES
Time: 2:42 PM EDT  Date of encounter:  10/20/2023  Independent Historian/Clinical History and Information was obtained by:   Patient    History is limited by: N/A    Chief Complaint: Neck tightness radiating down right arm      History of Present Illness:  Patient is a 62 y.o. year old male who presents to the emergency department for evaluation of neck tightness radiating down right arm into his right second third and fourth fingers that began 2 days ago.  Patient states he has an extensive neck and back history and has intermittent bouts of shooting pains down his right arm into his fingers.  Patient denies injury/trauma.  Patient denies chest pain or shortness of air.  Patient denies headache, vision changes, nausea/vomiting.    HPI    Patient Care Team  Primary Care Provider: Vishal Jasso MD    Past Medical History:     Allergies   Allergen Reactions    Dilantin [Phenytoin] Rash    Latex Rash     Past Medical History:   Diagnosis Date    Congestive heart failure     Hypertension     Injury of back      Past Surgical History:   Procedure Laterality Date    BACK SURGERY      CARDIAC CATHETERIZATION      CARPAL TUNNEL RELEASE      ORIF FOREARM FRACTURE      PATELLA FRACTURE SURGERY      REPLACEMENT TOTAL KNEE       History reviewed. No pertinent family history.    Home Medications:  Prior to Admission medications    Medication Sig Start Date End Date Taking? Authorizing Provider   aspirin 81 MG chewable tablet Chew 81 mg Daily.    Provider, MD Jr   bumetanide (BUMEX) 2 MG tablet Take 2 mg by mouth Daily.    Provider, MD Jr   cephalexin (KEFLEX) 500 MG capsule Take 1 capsule by mouth 2 (Two) Times a Day. 7/11/22   Johann Rubalcava PA-C   cetirizine (zyrTEC) 10 MG tablet Take 1 tablet by mouth Daily. 6/26/22   Tori Oseguera APRN   hydrocortisone 1 % ointment Apply 1 application topically to the appropriate area as directed 2 (Two) Times a Day. 6/26/22   Tori Oseguera APRN   METOPROLOL  "SUCCINATE PO Take  by mouth.    Provider, MD Jr   oxyCODONE-acetaminophen (PERCOCET) 5-325 MG per tablet Take 1 tablet by mouth Every 6 (Six) Hours As Needed.    Provider, MD Jr        Social History:   Social History     Tobacco Use    Smoking status: Former     Packs/day: .5     Types: Cigarettes    Smokeless tobacco: Never   Vaping Use    Vaping Use: Never used   Substance Use Topics    Alcohol use: Not Currently     Comment: social    Drug use: Never         Review of Systems:  Review of Systems   Constitutional:  Negative for chills and fever.   HENT:  Negative for congestion, rhinorrhea and sore throat.    Eyes:  Negative for pain and visual disturbance.   Respiratory:  Negative for apnea, cough, chest tightness and shortness of breath.    Cardiovascular:  Negative for chest pain and palpitations.   Gastrointestinal:  Negative for abdominal pain, diarrhea, nausea and vomiting.   Genitourinary:  Negative for difficulty urinating and dysuria.   Musculoskeletal:  Positive for back pain and neck pain. Negative for joint swelling and myalgias.   Skin:  Negative for color change.   Neurological:  Positive for numbness. Negative for seizures and headaches.   Psychiatric/Behavioral: Negative.     All other systems reviewed and are negative.       Physical Exam:  /99 (BP Location: Left arm, Patient Position: Sitting)   Pulse 73   Temp 98.4 °F (36.9 °C) (Oral)   Resp 18   Ht 157.5 cm (62\")   Wt 101 kg (223 lb 8.7 oz)   SpO2 98%   BMI 40.89 kg/m²     Physical Exam  Vitals and nursing note reviewed.   Constitutional:       General: He is not in acute distress.     Appearance: Normal appearance. He is not toxic-appearing.   HENT:      Head: Normocephalic and atraumatic.      Jaw: There is normal jaw occlusion.   Eyes:      General: Lids are normal.      Extraocular Movements: Extraocular movements intact.      Conjunctiva/sclera: Conjunctivae normal.      Pupils: Pupils are equal, round, and " reactive to light.   Cardiovascular:      Rate and Rhythm: Normal rate and regular rhythm.      Pulses: Normal pulses.      Heart sounds: Normal heart sounds.   Pulmonary:      Effort: Pulmonary effort is normal. No respiratory distress.      Breath sounds: Normal breath sounds. No wheezing or rhonchi.   Abdominal:      General: Abdomen is flat.      Palpations: Abdomen is soft.      Tenderness: There is no abdominal tenderness. There is no guarding or rebound.   Musculoskeletal:         General: Tenderness (Mild C-spine and lumbar spine tenderness appreciated upon palpation) present. Normal range of motion.      Cervical back: Normal range of motion and neck supple.      Right lower leg: No edema.      Left lower leg: No edema.   Skin:     General: Skin is warm and dry.   Neurological:      Mental Status: He is alert and oriented to person, place, and time. Mental status is at baseline.      Cranial Nerves: No cranial nerve deficit.   Psychiatric:         Mood and Affect: Mood normal.                Procedures:  Procedures      Medical Decision Making:      Comorbidities that affect care:    Congestive Heart Failure, Hypertension    External Notes reviewed:          The following orders were placed and all results were independently analyzed by me:  Orders Placed This Encounter   Procedures    XR Spine Cervical Complete 4 or 5 View    XR Spine Thoracic 3 View    PT Consult: Eval & Treat Functional Mobility Below Baseline    PT Plan of Care Cert / Re-Cert       Medications Given in the Emergency Department:  Medications - No data to display     ED Course:         Labs:    Lab Results (last 24 hours)       ** No results found for the last 24 hours. **             Imaging:    XR Spine Cervical Complete 4 or 5 View    Result Date: 10/20/2023  PROCEDURE: XR SPINE CERVICAL COMPLETE 4 OR 5 VW, 10/20/2023, 13:33 XR SPINE THORACIC 3 VW, 10/20/2023, 13:27  COMPARISON: None  INDICATIONS: NECK pain, radiating down right arm,   FINDINGS:  Cervical spine:  There is slight anterior offset of C3 on C4.  There is bilateral facet arthropathy.  On the oblique views there is narrowing the intervertebral foramina at C3-4 and C6-7 on the right and C3-4 and C6-7 on the left.  There is bilateral facet arthropathy.  The relationship of the lateral pillars of C1 with respect to C2 does not appear unusual.  Thoracic spine:  There are pedicle screws bridged by rods extending from the lower thoracic spine into the lumbar area.  There is a slight dextroscoliosis.  There are marginal osteophytes about the disc spaces.  The vertebral body heights seem reasonably well maintained.        1. Multilevel degenerative change cervical spine. 2. Multilevel degenerative change thoracic spine.  There is a slight dextroscoliosis.     JU LOPEZ MD       Electronically Signed and Approved By: JU LOPEZ MD on 10/20/2023 at 13:56             XR Spine Thoracic 3 View    Result Date: 10/20/2023  PROCEDURE: XR SPINE CERVICAL COMPLETE 4 OR 5 VW, 10/20/2023, 13:33 XR SPINE THORACIC 3 VW, 10/20/2023, 13:27  COMPARISON: None  INDICATIONS: NECK pain, radiating down right arm,  FINDINGS:  Cervical spine:  There is slight anterior offset of C3 on C4.  There is bilateral facet arthropathy.  On the oblique views there is narrowing the intervertebral foramina at C3-4 and C6-7 on the right and C3-4 and C6-7 on the left.  There is bilateral facet arthropathy.  The relationship of the lateral pillars of C1 with respect to C2 does not appear unusual.  Thoracic spine:  There are pedicle screws bridged by rods extending from the lower thoracic spine into the lumbar area.  There is a slight dextroscoliosis.  There are marginal osteophytes about the disc spaces.  The vertebral body heights seem reasonably well maintained.        1. Multilevel degenerative change cervical spine. 2. Multilevel degenerative change thoracic spine.  There is a slight dextroscoliosis.     JU LOPEZ MD        Electronically Signed and Approved By: JU LOPEZ MD on 10/20/2023 at 13:56                Differential Diagnosis and Discussion:    Extremity Pain: Differential diagnosis includes but is not limited to soft tissue sprain, tendonitis, tendon injury, dislocation, fracture, deep vein thrombosis, arterial insufficiency, osteoarthritis, bursitis, and ligamentous damage.  Neck Pain: The patient presents with neck pain. My differential diagnosis includes but is not limited to acute spinal epidural abscess, acute spinal epidural bleed, meningitis, musculoskeletal neck pain, spinal fracture, and osteoarthritis.     All X-rays impressions were independently interpreted by me.    MDM     X-rays of the thoracic and cervical spine show evidence of degenerative disc disease.  Patient is experiencing cervical radiculopathy.  I am going to start the patient on a steroid and muscle relaxer.  Physical therapy (Ayana) seen the patient and agrees and gave the patient exercises to use as well.  I will have the patient follow-up with PCP/orthopedic as needed.  I instructed the patient to return to the ED if he developed any worsening symptoms.  Patient states he understands agrees with plan of care.    Patient's neuro exam showed no cranial nerve deficit.      Patient Care Considerations:          Consultants/Shared Management Plan:    None    Social Determinants of Health:    Patient is independent, reliable, and has access to care.       Disposition and Care Coordination:    Discharged: I considered escalation of care by admitting this patient to the hospital, however the patient has improved and is suitable and stable for discharge.    I have explained the patient´s condition, diagnoses and treatment plan based on the information available to me at this time. I have answered questions and addressed any concerns. The patient has a good  understanding of the patient´s diagnosis, condition, and treatment plan as can be expected at  this point. The vital signs have been stable. The patient´s condition is stable and appropriate for discharge from the emergency department.      The patient will pursue further outpatient evaluation with the primary care physician or other designated or consulting physician as outlined in the discharge instructions. They are agreeable to this plan of care and follow-up instructions have been explained in detail. The patient has received these instructions in written format and have expressed an understanding of the discharge instructions. The patient is aware that any significant change in condition or worsening of symptoms should prompt an immediate return to this or the closest emergency department or call to 1.  I have explained discharge medications and the need for follow up with the patient/caretakers. This was also printed in the discharge instructions. Patient was discharged with the following medications and follow up:      Medication List        New Prescriptions      methocarbamol 750 MG tablet  Commonly known as: ROBAXIN  Take 1 tablet by mouth 3 (Three) Times a Day As Needed for Muscle Spasms for up to 5 days.     methylPREDNISolone 4 MG dose pack  Commonly known as: MEDROL  Take as directed on package instructions.               Where to Get Your Medications        These medications were sent to [id number] - [place], [place] - [address] AT [facility] - [phone]  - [phone]   [address] [place] [address]      Phone: [phone]   methocarbamol 750 MG tablet  methylPREDNISolone 4 MG dose pack      Vishal Jasso [place]  [address] [address] [address] [phone]    In 3 days  As needed       Final diagnoses:   Cervical radiculopathy        ED Disposition       ED Disposition   Discharge    Condition   Stable    Comment   --               This medical record created using voice recognition software.              Dante Mendez PA-C  10/20/23 1457

## 2023-10-20 NOTE — THERAPY EVALUATION
Patient Name: Radames Quinteros  : 1960    MRN: [id number]                              Today's Date: 10/20/2023       Admit Date: 10/20/2023    Visit Dx:     ICD-10-CM ICD-9-CM   1. Cervical radiculopathy  M54.12 723.4     There is no problem list on file for this patient.    Past Medical History:   Diagnosis Date    Congestive heart failure     Hypertension     Injury of back      Past Surgical History:   Procedure Laterality Date    BACK SURGERY      CARDIAC CATHETERIZATION      CARPAL TUNNEL RELEASE      ORIF FOREARM FRACTURE      PATELLA FRACTURE SURGERY      REPLACEMENT TOTAL KNEE        General Information       Row Name 10/20/23 1424          Physical Therapy Time and Intention    Document Type evaluation  -LR     Mode of Treatment individual therapy  -LR       Row Name 10/20/23 1424          General Information    Patient Profile Reviewed yes  -LR     Prior Level of Function independent:  -LR               User Key  (r) = Recorded By, (t) = Taken By, (c) = Cosigned By      Initials Name Provider Type    LR Ayana Solomon, PT Physical Therapist                  History: Patient reports for 5 days he has had pain in his upper back and into his right arm.  He denies any known injury.  He states he has numbness and tingling all the way down his arm into his right hand.  He has also been losing  strength in his right hand.  Patient is right-hand dominant.  Pain is 8/10.  He has tried heat to help with the pain.    Objective:  Posture: Forward head, rounded shoulders    Palpation: Tender to palpation at right upper trap, levator scapula, upper thoracic spinous processes    ROM:  Active Cervical ROM:  Flexion: WNL  Extension: 20°  L Side bendin°  R Side bendin°  L Rotation: 30°  R Rotation: 15°    Active Shoulder ROM: WNL bilaterally     Strength:  L Shoulder MMT:   R Shoulder MMT:  Flexion: 5   Flexion: 4  Abduction: 5   Abduction: 5  External Rotation: 5  External Rotation: 4+  Internal  Rotation: 5  Internal Rotation: 5    L Elbow MMT:   R Elbow MMT:  Flexion: 5   Flexion: 4+  Extension: 5   Extension: 5    L Wrist MMT:    R Wrist MMT:  Flexion: 5   Flexion: 5  Extension: 5   Extension: 4+    Decreased  strength on right    Special Tests:  Spurlings: Positive on right  Cervical Distraction Test: Positive     Sensation: Decreased sensation at C7 dermatome on right    Assessment/Plan:   Pt presents with a diagnosis of right arm pain and upper back pain and has signs and symptoms consistent with cervical radiculopathy to include decreased cervical ROM, right UE numbness, and right UE weakness that are limiting his ability to lift and reach.  The patient performed cervical and upper thoracic mobility exercises.  He was provided with a HEP handout.    Goals:   LTG 1: The patient will be independent in HEP in order to decrease pain and improve tolerance to functional activities.  STATUS: Met    Interventions:   Manual Therapy: Performed    Therapeutic Exercises: Upper trap stretch (2X 20 seconds), levator stretch (2X 20 seconds), seated thoracic extension (5 X5 seconds), open book (5X, patient instructed that he could also perform this in a seated position if he is concerned about balance), scapular retraction (5 X5 seconds)    Modalities: Not performed     Outcome Measures       Row Name 10/20/23 1424          Optimal Instrument    Optimal Instrument Optimal - 3  -LR     Reaching 2  -LR     Grasping 2  -LR     Lifting 2  -LR     From the list, choose the 3 activities you would most like to be able to do without any difficulty Reaching;Grasping;Lifting  -LR     Total Score Optimal - 3 6  -LR       Row Name 10/20/23 1424          Functional Assessment    Outcome Measure Options Optimal Instrument  -LR               User Key  (r) = Recorded By, (t) = Taken By, (c) = Cosigned By      Initials Name Provider Type    Ayana Hernandez PT Physical Therapist                   Time Calculation:   PT  Evaluation Complexity  History, PT Evaluation Complexity: 3 or more personal factors and/or comorbidities  Examination of Body Systems (PT Eval Complexity): 1-2 elements  Clinical Presentation (PT Evaluation Complexity): stable  Clinical Decision Making (PT Evaluation Complexity): low complexity  Overall Complexity (PT Evaluation Complexity): low complexity     PT Charges       Row Name 10/20/23 1425             Time Calculation    PT Received On 10/20/23  -LR         Time Calculation- PT    Total Timed Code Minutes- PT 30 minute(s)  -LR         Timed Charges    18510 - PT Therapeutic Exercise Minutes 10  -LR         Untimed Charges    PT Eval/Re-eval Minutes 20  -LR         Total Minutes    Timed Charges Total Minutes 10  -LR      Untimed Charges Total Minutes 20  -LR       Total Minutes 30  -LR                User Key  (r) = Recorded By, (t) = Taken By, (c) = Cosigned By      Initials Name Provider Type    LR Ayana Solomon, PT Physical Therapist                  Therapy Charges for Today       Code Description Service Date Service Provider Modifiers Qty    06844215478 HC PT THER PROC EA 15 MIN 10/20/2023 Ayana Solomon, PT GP 1    15856067165 HC PT EVAL LOW COMPLEXITY 2 10/20/2023 Ayana Solomon, PT GP 1            PT G-Codes  Outcome Measure Options: Optimal Instrument       Ayana Solomon PT  10/20/2023

## 2023-12-29 ENCOUNTER — TRANSCRIBE ORDERS (OUTPATIENT)
Dept: ADMINISTRATIVE | Facility: HOSPITAL | Age: 63
End: 2023-12-29
Payer: OTHER GOVERNMENT

## 2023-12-29 DIAGNOSIS — M54.12 RADICULOPATHY, CERVICAL: Primary | ICD-10-CM

## 2024-05-31 ENCOUNTER — APPOINTMENT (OUTPATIENT)
Dept: GENERAL RADIOLOGY | Facility: HOSPITAL | Age: 64
End: 2024-05-31
Payer: OTHER GOVERNMENT

## 2024-05-31 ENCOUNTER — HOSPITAL ENCOUNTER (EMERGENCY)
Facility: HOSPITAL | Age: 64
Discharge: HOME | End: 2024-05-31
Attending: EMERGENCY MEDICINE
Payer: OTHER GOVERNMENT

## 2024-05-31 VITALS
HEIGHT: 62 IN | WEIGHT: 224 LBS | BODY MASS INDEX: 41.22 KG/M2 | OXYGEN SATURATION: 92 % | RESPIRATION RATE: 27 BRPM | DIASTOLIC BLOOD PRESSURE: 83 MMHG | TEMPERATURE: 98.3 F | SYSTOLIC BLOOD PRESSURE: 135 MMHG | HEART RATE: 83 BPM

## 2024-05-31 DIAGNOSIS — J20.9 ACUTE BRONCHITIS, UNSPECIFIED ORGANISM: Primary | ICD-10-CM

## 2024-05-31 LAB
ALBUMIN SERPL-MCNC: 3.5 G/DL (ref 3.5–5.2)
ALBUMIN/GLOB SERPL: 1 G/DL
ALP SERPL-CCNC: 95 U/L (ref 39–117)
ALT SERPL W P-5'-P-CCNC: 28 U/L (ref 1–41)
ANION GAP SERPL CALCULATED.3IONS-SCNC: 14.1 MMOL/L (ref 5–15)
AST SERPL-CCNC: 19 U/L (ref 1–40)
BASOPHILS # BLD AUTO: 0.05 10*3/MM3 (ref 0–0.2)
BASOPHILS NFR BLD AUTO: 0.5 % (ref 0–1.5)
BILIRUB SERPL-MCNC: 0.9 MG/DL (ref 0–1.2)
BUN SERPL-MCNC: 17 MG/DL (ref 8–23)
BUN/CREAT SERPL: 15.2 (ref 7–25)
CALCIUM SPEC-SCNC: 8.4 MG/DL (ref 8.6–10.5)
CHLORIDE SERPL-SCNC: 102 MMOL/L (ref 98–107)
CO2 SERPL-SCNC: 21.9 MMOL/L (ref 22–29)
CREAT SERPL-MCNC: 1.12 MG/DL (ref 0.76–1.27)
DEPRECATED RDW RBC AUTO: 45.4 FL (ref 37–54)
EGFRCR SERPLBLD CKD-EPI 2021: 73.8 ML/MIN/1.73
EOSINOPHIL # BLD AUTO: 0.14 10*3/MM3 (ref 0–0.4)
EOSINOPHIL NFR BLD AUTO: 1.5 % (ref 0.3–6.2)
ERYTHROCYTE [DISTWIDTH] IN BLOOD BY AUTOMATED COUNT: 13.3 % (ref 12.3–15.4)
FLUAV SUBTYP SPEC NAA+PROBE: NOT DETECTED
FLUBV RNA ISLT QL NAA+PROBE: NOT DETECTED
GLOBULIN UR ELPH-MCNC: 3.4 GM/DL
GLUCOSE SERPL-MCNC: 103 MG/DL (ref 65–99)
HCT VFR BLD AUTO: 38.5 % (ref 37.5–51)
HGB BLD-MCNC: 12.3 G/DL (ref 13–17.7)
HOLD SPECIMEN: NORMAL
HOLD SPECIMEN: NORMAL
IMM GRANULOCYTES # BLD AUTO: 0.01 10*3/MM3 (ref 0–0.05)
IMM GRANULOCYTES NFR BLD AUTO: 0.1 % (ref 0–0.5)
LYMPHOCYTES # BLD AUTO: 1.5 10*3/MM3 (ref 0.7–3.1)
LYMPHOCYTES NFR BLD AUTO: 16.4 % (ref 19.6–45.3)
MCH RBC QN AUTO: 29.3 PG (ref 26.6–33)
MCHC RBC AUTO-ENTMCNC: 31.9 G/DL (ref 31.5–35.7)
MCV RBC AUTO: 91.7 FL (ref 79–97)
MONOCYTES # BLD AUTO: 0.85 10*3/MM3 (ref 0.1–0.9)
MONOCYTES NFR BLD AUTO: 9.3 % (ref 5–12)
NEUTROPHILS NFR BLD AUTO: 6.57 10*3/MM3 (ref 1.7–7)
NEUTROPHILS NFR BLD AUTO: 72.2 % (ref 42.7–76)
NRBC BLD AUTO-RTO: 0 /100 WBC (ref 0–0.2)
NT-PROBNP SERPL-MCNC: 324 PG/ML (ref 0–900)
PLATELET # BLD AUTO: 269 10*3/MM3 (ref 140–450)
PMV BLD AUTO: 10.1 FL (ref 6–12)
POTASSIUM SERPL-SCNC: 4 MMOL/L (ref 3.5–5.2)
PROT SERPL-MCNC: 6.9 G/DL (ref 6–8.5)
RBC # BLD AUTO: 4.2 10*6/MM3 (ref 4.14–5.8)
RSV RNA NPH QL NAA+NON-PROBE: NOT DETECTED
SARS-COV-2 RNA RESP QL NAA+PROBE: NOT DETECTED
SODIUM SERPL-SCNC: 138 MMOL/L (ref 136–145)
TROPONIN T SERPL HS-MCNC: 30 NG/L
WBC NRBC COR # BLD AUTO: 9.12 10*3/MM3 (ref 3.4–10.8)
WHOLE BLOOD HOLD COAG: NORMAL
WHOLE BLOOD HOLD SPECIMEN: NORMAL

## 2024-05-31 PROCEDURE — 84484 ASSAY OF TROPONIN QUANT: CPT | Performed by: EMERGENCY MEDICINE

## 2024-05-31 PROCEDURE — 80053 COMPREHEN METABOLIC PANEL: CPT | Performed by: EMERGENCY MEDICINE

## 2024-05-31 PROCEDURE — 71045 X-RAY EXAM CHEST 1 VIEW: CPT

## 2024-05-31 PROCEDURE — 25010000002 METHYLPREDNISOLONE PER 125 MG: Performed by: EMERGENCY MEDICINE

## 2024-05-31 PROCEDURE — 96374 THER/PROPH/DIAG INJ IV PUSH: CPT

## 2024-05-31 PROCEDURE — 94799 UNLISTED PULMONARY SVC/PX: CPT

## 2024-05-31 PROCEDURE — 94640 AIRWAY INHALATION TREATMENT: CPT

## 2024-05-31 PROCEDURE — 99284 EMERGENCY DEPT VISIT MOD MDM: CPT

## 2024-05-31 PROCEDURE — 83880 ASSAY OF NATRIURETIC PEPTIDE: CPT | Performed by: EMERGENCY MEDICINE

## 2024-05-31 PROCEDURE — 87637 SARSCOV2&INF A&B&RSV AMP PRB: CPT | Performed by: EMERGENCY MEDICINE

## 2024-05-31 PROCEDURE — 85025 COMPLETE CBC W/AUTO DIFF WBC: CPT | Performed by: EMERGENCY MEDICINE

## 2024-05-31 PROCEDURE — 93005 ELECTROCARDIOGRAM TRACING: CPT | Performed by: EMERGENCY MEDICINE

## 2024-05-31 PROCEDURE — 36415 COLL VENOUS BLD VENIPUNCTURE: CPT

## 2024-05-31 RX ORDER — ALBUTEROL SULFATE 1.25 MG/3ML
1 SOLUTION RESPIRATORY (INHALATION) EVERY 6 HOURS PRN
Qty: 360 ML | Refills: 0 | Status: SHIPPED | OUTPATIENT
Start: 2024-05-31

## 2024-05-31 RX ORDER — SODIUM CHLORIDE 0.9 % (FLUSH) 0.9 %
10 SYRINGE (ML) INJECTION AS NEEDED
Status: DISCONTINUED | OUTPATIENT
Start: 2024-05-31 | End: 2024-06-01 | Stop reason: HOSPADM

## 2024-05-31 RX ORDER — DOXYCYCLINE 100 MG/1
100 CAPSULE ORAL 2 TIMES DAILY
Qty: 14 CAPSULE | Refills: 0 | Status: SHIPPED | OUTPATIENT
Start: 2024-05-31

## 2024-05-31 RX ORDER — IPRATROPIUM BROMIDE AND ALBUTEROL SULFATE 2.5; .5 MG/3ML; MG/3ML
3 SOLUTION RESPIRATORY (INHALATION) ONCE
Status: COMPLETED | OUTPATIENT
Start: 2024-05-31 | End: 2024-05-31

## 2024-05-31 RX ADMIN — METHYLPREDNISOLONE SODIUM SUCCINATE 125 MG: 125 INJECTION INTRAMUSCULAR; INTRAVENOUS at 20:39

## 2024-05-31 RX ADMIN — IPRATROPIUM BROMIDE AND ALBUTEROL SULFATE 3 ML: .5; 3 SOLUTION RESPIRATORY (INHALATION) at 23:17

## 2024-05-31 RX ADMIN — IPRATROPIUM BROMIDE AND ALBUTEROL SULFATE 3 ML: .5; 3 SOLUTION RESPIRATORY (INHALATION) at 20:17

## 2024-05-31 NOTE — ED PROVIDER NOTES
"Time: 7:19 PM EDT  Date of encounter:  5/31/2024  Independent Historian/Clinical History and Information was obtained by:   Patient    History is limited by: N/A    Chief Complaint: Difficulty breathing      History of Present Illness:  Patient is a 63 y.o. year old male who presents to the emergency department for evaluation of shortness of breath for the past 1 week.  Patient states he has \"environmental triggers\" and that he was out mowing and felt wheezy and had worsening difficulty breathing afterwards.  Afebrile.  Has a remote history of congestive heart failure but no active issues with it.  Slightly productive cough.  Symptoms are worse with exertion and patient feels chest tightness with exertion only.    HPI    Patient Care Team  Primary Care Provider: Vishal Jasso MD    Past Medical History:     Allergies   Allergen Reactions    Dilantin [Phenytoin] Rash    Latex Rash     Past Medical History:   Diagnosis Date    Congestive heart failure     Hypertension     Injury of back      Past Surgical History:   Procedure Laterality Date    BACK SURGERY      CARDIAC CATHETERIZATION      CARPAL TUNNEL RELEASE      ORIF FOREARM FRACTURE      PATELLA FRACTURE SURGERY      REPLACEMENT TOTAL KNEE       No family history on file.    Home Medications:  Prior to Admission medications    Medication Sig Start Date End Date Taking? Authorizing Provider   aspirin 81 MG chewable tablet Chew 81 mg Daily.    Provider, MD Jr   bumetanide (BUMEX) 2 MG tablet Take 2 mg by mouth Daily.    Provider, MD Jr   cephalexin (KEFLEX) 500 MG capsule Take 1 capsule by mouth 2 (Two) Times a Day. 7/11/22   Johann Rubalcava PA-C   cetirizine (zyrTEC) 10 MG tablet Take 1 tablet by mouth Daily. 6/26/22   Tori Oseguera APRN   hydrocortisone 1 % ointment Apply 1 application topically to the appropriate area as directed 2 (Two) Times a Day. 6/26/22   Tori Oseguera APRN   methylPREDNISolone (MEDROL) 4 MG dose pack Take as " "directed on package instructions. 10/20/23   Dante Mendez PA-C   METOPROLOL SUCCINATE PO Take  by mouth.    Provider, MD Jr   oxyCODONE-acetaminophen (PERCOCET) 5-325 MG per tablet Take 1 tablet by mouth Every 6 (Six) Hours As Needed.    Provider, MD Jr        Social History:   Social History     Tobacco Use    Smoking status: Former     Current packs/day: 0.50     Types: Cigarettes    Smokeless tobacco: Never   Vaping Use    Vaping status: Never Used   Substance Use Topics    Alcohol use: Not Currently     Comment: social    Drug use: Never         Review of Systems:  Review of Systems   Constitutional:  Negative for chills and fever.   HENT:  Negative for congestion, rhinorrhea and sore throat.    Eyes:  Negative for photophobia.   Respiratory:  Positive for cough, chest tightness, shortness of breath and wheezing. Negative for apnea.    Cardiovascular:  Negative for chest pain and palpitations.   Gastrointestinal:  Negative for abdominal pain, diarrhea, nausea and vomiting.   Endocrine: Negative.    Genitourinary:  Negative for difficulty urinating and dysuria.   Musculoskeletal:  Negative for back pain, joint swelling and myalgias.   Skin:  Negative for color change and wound.   Allergic/Immunologic: Negative.    Neurological:  Negative for seizures and headaches.   Psychiatric/Behavioral: Negative.     All other systems reviewed and are negative.       Physical Exam:  /72 (BP Location: Left arm, Patient Position: Sitting)   Pulse 78   Temp 98.6 °F (37 °C) (Oral)   Resp 16   Ht 157.5 cm (62\")   Wt 102 kg (224 lb)   SpO2 93%   BMI 40.97 kg/m²     Physical Exam  Vitals and nursing note reviewed.   Constitutional:       General: He is awake.      Appearance: Normal appearance. He is well-developed.   HENT:      Head: Normocephalic and atraumatic.      Nose: Nose normal.      Mouth/Throat:      Mouth: Mucous membranes are moist.   Eyes:      Extraocular Movements: Extraocular " movements intact.      Pupils: Pupils are equal, round, and reactive to light.   Cardiovascular:      Rate and Rhythm: Normal rate and regular rhythm.      Heart sounds: Normal heart sounds.   Pulmonary:      Effort: Pulmonary effort is normal. No respiratory distress.      Breath sounds: Examination of the left-upper field reveals wheezing. Wheezing present. No rhonchi or rales.   Abdominal:      General: Bowel sounds are normal.      Palpations: Abdomen is soft.      Tenderness: There is no abdominal tenderness. There is no guarding or rebound.      Comments: No rigidity   Musculoskeletal:         General: No tenderness. Normal range of motion.      Cervical back: Normal range of motion and neck supple.   Skin:     General: Skin is warm and dry.      Coloration: Skin is not jaundiced.   Neurological:      General: No focal deficit present.      Mental Status: He is alert. Mental status is at baseline.   Psychiatric:         Mood and Affect: Mood normal.                  Procedures:  Procedures      Medical Decision Making:      Comorbidities that affect care:    Hypertension, CHF , cervical spondylosis    External Notes reviewed:    Previous Clinic Note: Office visit 5/13/2024 with spine surgery Dr. Pichardo.  Description: Status post spinal fusion      The following orders were placed and all results were independently analyzed by me:  Orders Placed This Encounter   Procedures    Home Nebulizer    COVID PRE-OP / PRE-PROCEDURE SCREENING ORDER (NO ISOLATION) - Swab, Nasopharynx    COVID-19, FLU A/B, RSV PCR 1 HR TAT - Swab, Nasopharynx    XR Chest 1 View    [place] Draw    Comprehensive Metabolic Panel    BNP    Single High Sensitivity Troponin T    CBC Auto Differential    NPO Diet NPO Type: Strict NPO    Undress & Gown    Continuous Pulse Oximetry    Vital Signs    Check Pulse Oximetry while ambulating    Oxygen Therapy- Nasal Cannula; Titrate 1-6 LPM Per SpO2; 90 - 95%    ECG 12 Lead ED Triage Standing Order;  SOA    Insert Peripheral IV    Insert Peripheral IV    CBC & Differential    Green Top (Gel)    Lavender Top    Gold Top - SST    Light Blue Top       Medications Given in the Emergency Department:  Medications   sodium chloride 0.9 % flush 10 mL (has no administration in time range)   sodium chloride 0.9 % flush 10 mL (has no administration in time range)   ipratropium-albuterol (DUO-NEB) nebulizer solution 3 mL (has no administration in time range)   methylPREDNISolone sodium succinate (SOLU-Medrol) 125 mg in sterile water (preservative free) 2 mL (125 mg Intravenous Given 5/31/24 2039)   ipratropium-albuterol (DUO-NEB) nebulizer solution 3 mL (3 mL Nebulization Given 5/31/24 2017)        ED Course:    ED Course as of 05/31/24 2311   Fri May 31, 2024   1953 I have personally interpreted the EKG today and it shows no evidence of any acute ischemia or heart arrhythmia. [RP]      ED Course User Index  [RP] Santosh Howard MD       Labs:    Lab Results (last 24 hours)       Procedure Component Value Units Date/Time    CBC & Differential [[id number]]  (Abnormal) Collected: 05/31/24 1931    Specimen: Blood Updated: 05/31/24 1939    Narrative:      The following orders were created for panel order CBC & Differential.  Procedure                               Abnormality         Status                     ---------                               -----------         ------                     CBC Auto Differential[[id number]]        Abnormal            Final result                 Please view results for these tests on the individual orders.    Comprehensive Metabolic Panel [[id number]]  (Abnormal) Collected: 05/31/24 1931    Specimen: Blood Updated: 05/31/24 2001     Glucose 103 mg/dL      BUN 17 mg/dL      Creatinine 1.12 mg/dL      Sodium 138 mmol/L      Potassium 4.0 mmol/L      Chloride 102 mmol/L      CO2 21.9 mmol/L      Calcium 8.4 mg/dL      Total Protein 6.9 g/dL      Albumin 3.5 g/dL      ALT (SGPT) 28 U/L       AST (SGOT) 19 U/L      Alkaline Phosphatase 95 U/L      Total Bilirubin 0.9 mg/dL      Globulin 3.4 gm/dL      A/G Ratio 1.0 g/dL      BUN/Creatinine Ratio 15.2     Anion Gap 14.1 mmol/L      eGFR 73.8 mL/min/1.73     Narrative:      GFR Normal >60  Chronic Kidney Disease <60  Kidney Failure <15      BNP [[id number]]  (Normal) Collected: 05/31/24 1931    Specimen: Blood Updated: 05/31/24 1957     proBNP 324.0 pg/mL     Narrative:      This assay is used as an aid in the diagnosis of individuals suspected of having heart failure. It can be used as an aid in the diagnosis of acute decompensated heart failure (ADHF) in patients presenting with signs and symptoms of ADHF to the emergency department (ED). In addition, NT-proBNP of <300 pg/mL indicates ADHF is not likely.    Age Range Result Interpretation  NT-proBNP Concentration (pg/mL:      <50             Positive            >450                   Gray                 300-450                    Negative             <300    50-75           Positive            >900                  Gray                300-900                  Negative            <300      >75             Positive            >1800                  Gray                300-1800                  Negative            <300    Single High Sensitivity Troponin T [[id number]]  (Abnormal) Collected: 05/31/24 1931    Specimen: Blood Updated: 05/31/24 2001     HS Troponin T 30 ng/L     Narrative:      High Sensitive Troponin T Reference Range:  <14.0 ng/L- Negative Female for AMI  <22.0 ng/L- Negative Male for AMI  >=14 - Abnormal Female indicating possible myocardial injury.  >=22 - Abnormal Male indicating possible myocardial injury.   Clinicians would have to utilize clinical acumen, EKG, Troponin, and serial changes to determine if it is an Acute Myocardial Infarction or myocardial injury due to an underlying chronic condition.         CBC Auto Differential [[id number]]  (Abnormal) Collected: 05/31/24 1931     Specimen: Blood Updated: 05/31/24 1939     WBC 9.12 10*3/mm3      RBC 4.20 10*6/mm3      Hemoglobin 12.3 g/dL      Hematocrit 38.5 %      MCV 91.7 fL      MCH 29.3 pg      MCHC 31.9 g/dL      RDW 13.3 %      RDW-SD 45.4 fl      MPV 10.1 fL      Platelets 269 10*3/mm3      Neutrophil % 72.2 %      Lymphocyte % 16.4 %      Monocyte % 9.3 %      Eosinophil % 1.5 %      Basophil % 0.5 %      Immature Grans % 0.1 %      Neutrophils, Absolute 6.57 10*3/mm3      Lymphocytes, Absolute 1.50 10*3/mm3      Monocytes, Absolute 0.85 10*3/mm3      Eosinophils, Absolute 0.14 10*3/mm3      Basophils, Absolute 0.05 10*3/mm3      Immature Grans, Absolute 0.01 10*3/mm3      nRBC 0.0 /100 WBC     COVID PRE-OP / PRE-PROCEDURE SCREENING ORDER (NO ISOLATION) - Swab, Nasopharynx [[id number]]  (Normal) Collected: 05/31/24 1931    Specimen: Swab from Nasopharynx Updated: 05/31/24 2018    Narrative:      The following orders were created for panel order COVID PRE-OP / PRE-PROCEDURE SCREENING ORDER (NO ISOLATION) - Swab, Nasopharynx.  Procedure                               Abnormality         Status                     ---------                               -----------         ------                     COVID-19, FLU A/B, RSV P...[[id number]]  Normal              Final result                 Please view results for these tests on the individual orders.    COVID-19, FLU A/B, RSV PCR 1 HR TAT - Swab, Nasopharynx [[id number]]  (Normal) Collected: 05/31/24 1931    Specimen: Swab from Nasopharynx Updated: 05/31/24 2018     COVID19 Not Detected     Influenza A PCR Not Detected     Influenza B PCR Not Detected     RSV, PCR Not Detected    Narrative:      Fact sheet for providers: https://www.fda.gov/media/037047/download    Fact sheet for patients: https://www.fda.gov/media/269430/download    Test performed by PCR.             Imaging:    XR Chest 1 View    Result Date: 5/31/2024  XR CHEST 1 VW-  Date of Exam: 5/31/2024 7:37 PM  Indication: SOA Triage  Protocol  Comparison: CXR 3/11/2023  Findings: The heart remains normal in size. Low lung volumes are evident, with subsegmental atelectasis in the right upper lobe, and at the lung bases. No consolidation or mass is seen.  Pedicle screws and rods at lower thoracic and upper lumbar levels are consistent with extensive fusion.      Impression: Low lung volumes with subsegmental atelectasis in the right upper lobe, and at the lung bases.   Electronically Signed By-ALEXANDRIA ROBLES MD On:5/31/2024 7:47 PM         Differential Diagnosis and Discussion:    Dyspnea: Differential diagnosis includes but is not limited to metabolic acidosis, neurological disorders, psychogenic, asthma, pneumothorax, upper airway obstruction, COPD, pneumonia, noncardiogenic pulmonary edema, interstitial lung disease, anemia, congestive heart failure, and pulmonary embolism    All labs were reviewed and interpreted by me.  All X-rays impressions were independently interpreted by me.  EKG was interpreted by me.    MDM     Amount and/or Complexity of Data Reviewed  Clinical lab tests: reviewed  Tests in the radiology section of CPT®: reviewed  Tests in the medicine section of CPT®: reviewed                 Patient Care Considerations:    PERC: I used the PERC score to risk stratify the patient for PE and a CT of the chest was considered but ultimately not indicated in today's visit.      Consultants/Shared Management Plan:    None    Social Determinants of Health:    Patient is independent, reliable, and has access to care.       Disposition and Care Coordination:    Discharged: I considered escalation of care by admitting this patient to the hospital, however ultimately the patient elects to be discharged despite being offered admission multiple times.  He does subjectively feel better here in the emergency department.  He did not become hypoxic with ambulation and at the time of my discharge she has O2 sats of 94% on room air.    I have  explained the patient´s condition, diagnoses and treatment plan based on the information available to me at this time. I have answered questions and addressed any concerns. The patient has a good  understanding of the patient´s diagnosis, condition, and treatment plan as can be expected at this point. The vital signs have been stable. The patient´s condition is stable and appropriate for discharge from the emergency department.      The patient will pursue further outpatient evaluation with the primary care physician or other designated or consulting physician as outlined in the discharge instructions. They are agreeable to this plan of care and follow-up instructions have been explained in detail. The patient has received these instructions in written format and has expressed an understanding of the discharge instructions. The patient is aware that any significant change in condition or worsening of symptoms should prompt an immediate return to this or the closest emergency department or call to 911.    Final diagnoses:   Acute bronchitis, unspecified organism        ED Disposition       ED Disposition   Discharge    Condition   Stable    Comment   --               This medical record created using voice recognition software.             Santosh Howard MD  05/31/24 0962

## 2024-06-01 NOTE — ED NOTES
Ambulated pt to restroom while monitoring pulse ox. Patients O2 level maintained 93-94% Patient ambulated roughly 150 feet. Provider made aware.

## 2024-06-01 NOTE — DISCHARGE INSTRUCTIONS
Return to emergency department immediately for fever, worsening difficulty breathing or persistent chest pain.  Stay well-hydrated and take the medications as prescribed.  Follow-up your primary care provider first thing Monday morning.

## 2024-06-02 LAB
QT INTERVAL: 393 MS
QTC INTERVAL: 453 MS

## 2024-06-12 ENCOUNTER — OFFICE VISIT (OUTPATIENT)
Dept: SURGERY | Facility: CLINIC | Age: 64
End: 2024-06-12
Payer: OTHER GOVERNMENT

## 2024-06-12 ENCOUNTER — PREP FOR SURGERY (OUTPATIENT)
Dept: OTHER | Facility: HOSPITAL | Age: 64
End: 2024-06-12
Payer: OTHER GOVERNMENT

## 2024-06-12 VITALS
HEART RATE: 84 BPM | DIASTOLIC BLOOD PRESSURE: 67 MMHG | WEIGHT: 235 LBS | SYSTOLIC BLOOD PRESSURE: 122 MMHG | BODY MASS INDEX: 43.24 KG/M2 | HEIGHT: 62 IN

## 2024-06-12 DIAGNOSIS — Z12.12 SCREENING FOR COLORECTAL CANCER: Primary | ICD-10-CM

## 2024-06-12 DIAGNOSIS — Z12.11 SCREENING FOR COLORECTAL CANCER: Primary | ICD-10-CM

## 2024-06-12 RX ORDER — ACETAMINOPHEN 500 MG
TABLET ORAL
COMMUNITY

## 2024-06-12 RX ORDER — SODIUM, POTASSIUM,MAG SULFATES 17.5-3.13G
SOLUTION, RECONSTITUTED, ORAL ORAL
Qty: 177 ML | Refills: 0 | Status: SHIPPED | OUTPATIENT
Start: 2024-06-12

## 2024-06-12 RX ORDER — METOPROLOL TARTRATE 100 MG/1
1 TABLET ORAL 2 TIMES DAILY
COMMUNITY

## 2024-06-13 NOTE — PROGRESS NOTES
General Surgery/Colorectal Surgery Note    Patient Name:  Radames Quinteros  YOB: 1960  [id number]    Referring Provider: Vishal Jasso MD      Patient Care Team:  Vishal Jasso MD as PCP - General (Internal Medicine)  Cali Moulton MD as Consulting Physician (General Surgery)    Chief complaint colonoscopy    Subjective .     History of present illness:    He comes in to discuss asymptomatic screening colonoscopy.  No previous colonoscopy.  No blood or mucus per rectum.  No abdominal pain.  No blood thinner use.  No recent chest pain.  No family history of colorectal cancer.      History:  Past Medical History:   Diagnosis Date    Congestive heart failure     Hypertension     Injury of back        Past Surgical History:   Procedure Laterality Date    BACK SURGERY      CARDIAC CATHETERIZATION      CARPAL TUNNEL RELEASE      ORIF FOREARM FRACTURE      PATELLA FRACTURE SURGERY      REPLACEMENT TOTAL KNEE         History reviewed. No pertinent family history.    Social History     Tobacco Use    Smoking status: Former     Current packs/day: 0.50     Types: Cigarettes    Smokeless tobacco: Never   Vaping Use    Vaping status: Never Used   Substance Use Topics    Alcohol use: Not Currently     Comment: social    Drug use: Never       Review of Systems  All systems were reviewed and negative except for:   Review of Systems   Constitutional: Negative for chills, fever and unexpected weight loss.   HENT: Negative for congestion, nosebleeds and voice change.    Eyes: Negative for blurred vision, double vision and discharge.   Respiratory: Negative for apnea, chest tightness and shortness of breath.    Cardiovascular: Negative for chest pain and leg swelling.   Gastrointestinal:        See HPI   Endocrine: Negative for cold intolerance and heat intolerance.   Genitourinary: Negative for dysuria, hematuria and urgency.   Musculoskeletal: Negative for back pain, joint swelling and neck pain.    Skin: Negative for color change and dry skin.   Neurological: Negative for dizziness and confusion.   Hematological: Negative for adenopathy.   Psychiatric/Behavioral: Negative for agitation and behavioral problems.     MEDS:  Prior to Admission medications    Medication Sig Start Date End Date Taking? Authorizing Provider   albuterol (ACCUNEB) 1.25 MG/3ML nebulizer solution Take 3 mL by nebulization Every 6 (Six) Hours As Needed for Shortness of Air. 5/31/24  Yes Santosh Howard MD   bumetanide (BUMEX) 2 MG tablet Take 1 tablet by mouth Daily.   Yes Jr Muniz MD   acetaminophen (TYLENOL) 500 MG tablet Take 2 tablets every 6 hours by oral route.    Jr Muniz MD   aspirin 81 MG chewable tablet Chew 81 mg Daily.  Patient not taking: Reported on 6/12/2024    Jr Muniz MD   cephalexin (KEFLEX) 500 MG capsule Take 1 capsule by mouth 2 (Two) Times a Day. 7/11/22   Johann Rubalcava PA-C   cetirizine (zyrTEC) 10 MG tablet Take 1 tablet by mouth Daily.  Patient not taking: Reported on 6/12/2024 6/26/22   Tori Oseguera APRN   doxycycline (MONODOX) 100 MG capsule Take 1 capsule by mouth 2 (Two) Times a Day. 5/31/24   Santosh Howard MD   hydrocortisone 1 % ointment Apply 1 application topically to the appropriate area as directed 2 (Two) Times a Day. 6/26/22   Tori Oseguera APRN   methylPREDNISolone (MEDROL) 4 MG dose pack Take as directed on package instructions. 10/20/23   Dante Mendez PA-C   METOPROLOL SUCCINATE PO Take  by mouth.  Patient not taking: Reported on 6/12/2024    Jr Muniz MD   metoprolol tartrate (LOPRESSOR) 100 MG tablet Take 1 tablet by mouth 2 (Two) Times a Day.    Jr Muniz MD   oxyCODONE-acetaminophen (PERCOCET) 5-325 MG per tablet Take 1 tablet by mouth Every 6 (Six) Hours As Needed.  Patient not taking: Reported on 6/12/2024    Jr Muniz MD   sodium-potassium-magnesium sulfates (SUPREP) 17.5-3.13-1.6 GM/177ML solution  "oral solution Take as directed 6/12/24   Cali Moulton MD        Allergies:  Dilantin [phenytoin] and Latex    Objective     Vital Signs   Heart Rate:  [84] 84  BP: (122)/(67) 122/67    Physical Exam:     General Appearance:    Alert, cooperative, in no acute distress   Head:    Normocephalic, without obvious abnormality, atraumatic   Eyes:          Conjunctivae and sclerae normal, no icterus,     Ears:    Ears appear intact with no abnormalities noted   Throat:   No oral lesions, no thrush, oral mucosa moist   Neck:   No adenopathy, supple, trachea midline, no thyromegaly   Back:     No kyphosis present, no scoliosis present, no skin lesions,      erythema or scars, no tenderness to percussion or                   palpation,   range of motion normal   Lungs:     Clear to auscultation,respirations regular, even and                  unlabored    Heart:    Regular rhythm and normal rate, normal S1 and S2, no            murmur, no gallop, no rub, no click   Chest Wall:    No abnormalities observed   Abdomen:     Normal bowel sounds, no masses, no organomegaly, soft        non-tender, non-distended, no guarding, no rebound                tenderness   Rectal:        Extremities:   Moves all extremities well, no edema, no cyanosis, no             redness   Pulses:   Pulses palpable and equal bilaterally   Skin:   No bleeding, bruising or rash   Lymph nodes:   No palpable adenopathy   Neurologic:   A/o x 4 with no deficits       Results Review:   {Results Review:53162::\"I reviewed the patient's new clinical results.\"    LABS/IMAGING:  Results for orders placed or performed during the hospital encounter of 05/31/24   COVID-19, FLU A/B, RSV PCR 1 HR TAT - Swab, Nasopharynx    Specimen: Nasopharynx; Swab   Result Value Ref Range    COVID19 Not Detected Not Detected - Ref. Range    Influenza A PCR Not Detected Not Detected    Influenza B PCR Not Detected Not Detected    RSV, PCR Not Detected Not Detected "   Comprehensive Metabolic Panel    Specimen: Blood   Result Value Ref Range    Glucose 103 (H) 65 - 99 mg/dL    BUN 17 8 - 23 mg/dL    Creatinine 1.12 0.76 - 1.27 mg/dL    Sodium 138 136 - 145 mmol/L    Potassium 4.0 3.5 - 5.2 mmol/L    Chloride 102 98 - 107 mmol/L    CO2 21.9 (L) 22.0 - 29.0 mmol/L    Calcium 8.4 (L) 8.6 - 10.5 mg/dL    Total Protein 6.9 6.0 - 8.5 g/dL    Albumin 3.5 3.5 - 5.2 g/dL    ALT (SGPT) 28 1 - 41 U/L    AST (SGOT) 19 1 - 40 U/L    Alkaline Phosphatase 95 39 - 117 U/L    Total Bilirubin 0.9 0.0 - 1.2 mg/dL    Globulin 3.4 gm/dL    A/G Ratio 1.0 g/dL    BUN/Creatinine Ratio 15.2 7.0 - 25.0    Anion Gap 14.1 5.0 - 15.0 mmol/L    eGFR 73.8 >60.0 mL/min/1.73   BNP    Specimen: Blood   Result Value Ref Range    proBNP 324.0 0.0 - 900.0 pg/mL   Single High Sensitivity Troponin T    Specimen: Blood   Result Value Ref Range    HS Troponin T 30 (H) <22 ng/L   CBC Auto Differential    Specimen: Blood   Result Value Ref Range    WBC 9.12 3.40 - 10.80 10*3/mm3    RBC 4.20 4.14 - 5.80 10*6/mm3    Hemoglobin 12.3 (L) 13.0 - 17.7 g/dL    Hematocrit 38.5 37.5 - 51.0 %    MCV 91.7 79.0 - 97.0 fL    MCH 29.3 26.6 - 33.0 pg    MCHC 31.9 31.5 - 35.7 g/dL    RDW 13.3 12.3 - 15.4 %    RDW-SD 45.4 37.0 - 54.0 fl    MPV 10.1 6.0 - 12.0 fL    Platelets 269 140 - 450 10*3/mm3    Neutrophil % 72.2 42.7 - 76.0 %    Lymphocyte % 16.4 (L) 19.6 - 45.3 %    Monocyte % 9.3 5.0 - 12.0 %    Eosinophil % 1.5 0.3 - 6.2 %    Basophil % 0.5 0.0 - 1.5 %    Immature Grans % 0.1 0.0 - 0.5 %    Neutrophils, Absolute 6.57 1.70 - 7.00 10*3/mm3    Lymphocytes, Absolute 1.50 0.70 - 3.10 10*3/mm3    Monocytes, Absolute 0.85 0.10 - 0.90 10*3/mm3    Eosinophils, Absolute 0.14 0.00 - 0.40 10*3/mm3    Basophils, Absolute 0.05 0.00 - 0.20 10*3/mm3    Immature Grans, Absolute 0.01 0.00 - 0.05 10*3/mm3    nRBC 0.0 0.0 - 0.2 /100 WBC   ECG 12 Lead ED Triage Standing Order; SOA   Result Value Ref Range    QT Interval 393 ms    QTC Interval 453 ms    Green Top (Gel)   Result Value Ref Range    Extra Tube Hold for add-ons.    Lavender Top   Result Value Ref Range    Extra Tube hold for add-on    Gold Top - SST   Result Value Ref Range    Extra Tube Hold for add-ons.    Light Blue Top   Result Value Ref Range    Extra Tube Hold for add-ons.         Result Review :     Assessment & Plan     Asymptomatic screening for colorectal cancer    I recommended colonoscopy.  Benefits and alternatives discussed.  Risk procedure including bleeding, perforation, missing a polyp discussed.  All questions answered.  He agrees with the plan.  Orders placed.  Thank you for the consult.              This document has been electronically signed by Cali Moulton MD  June 13, 2024 09:41 EDT

## 2024-06-15 ENCOUNTER — APPOINTMENT (OUTPATIENT)
Dept: GENERAL RADIOLOGY | Facility: HOSPITAL | Age: 64
End: 2024-06-15
Payer: OTHER GOVERNMENT

## 2024-06-15 ENCOUNTER — HOSPITAL ENCOUNTER (EMERGENCY)
Facility: HOSPITAL | Age: 64
Discharge: HOME | End: 2024-06-15
Attending: EMERGENCY MEDICINE
Payer: OTHER GOVERNMENT

## 2024-06-15 VITALS
BODY MASS INDEX: 42.98 KG/M2 | DIASTOLIC BLOOD PRESSURE: 81 MMHG | HEIGHT: 62 IN | RESPIRATION RATE: 21 BRPM | OXYGEN SATURATION: 95 % | SYSTOLIC BLOOD PRESSURE: 137 MMHG | HEART RATE: 99 BPM | TEMPERATURE: 98.7 F

## 2024-06-15 DIAGNOSIS — R06.00 DYSPNEA, UNSPECIFIED TYPE: ICD-10-CM

## 2024-06-15 DIAGNOSIS — R07.9 CHEST PAIN, UNSPECIFIED TYPE: Primary | ICD-10-CM

## 2024-06-15 LAB
ALBUMIN SERPL-MCNC: 3.6 G/DL (ref 3.5–5.2)
ALBUMIN/GLOB SERPL: 1 G/DL
ALP SERPL-CCNC: 113 U/L (ref 39–117)
ALT SERPL W P-5'-P-CCNC: 17 U/L (ref 1–41)
ANION GAP SERPL CALCULATED.3IONS-SCNC: 14.6 MMOL/L (ref 5–15)
AST SERPL-CCNC: 18 U/L (ref 1–40)
BASOPHILS # BLD AUTO: 0.05 10*3/MM3 (ref 0–0.2)
BASOPHILS NFR BLD AUTO: 0.6 % (ref 0–1.5)
BILIRUB SERPL-MCNC: 1.5 MG/DL (ref 0–1.2)
BUN SERPL-MCNC: 15 MG/DL (ref 8–23)
BUN/CREAT SERPL: 12.9 (ref 7–25)
CALCIUM SPEC-SCNC: 8.7 MG/DL (ref 8.6–10.5)
CHLORIDE SERPL-SCNC: 100 MMOL/L (ref 98–107)
CO2 SERPL-SCNC: 25.4 MMOL/L (ref 22–29)
CREAT SERPL-MCNC: 1.16 MG/DL (ref 0.76–1.27)
DEPRECATED RDW RBC AUTO: 42.8 FL (ref 37–54)
EGFRCR SERPLBLD CKD-EPI 2021: 70.8 ML/MIN/1.73
EOSINOPHIL # BLD AUTO: 0.1 10*3/MM3 (ref 0–0.4)
EOSINOPHIL NFR BLD AUTO: 1.1 % (ref 0.3–6.2)
ERYTHROCYTE [DISTWIDTH] IN BLOOD BY AUTOMATED COUNT: 13.1 % (ref 12.3–15.4)
GEN 5 2HR TROPONIN T REFLEX: 33 NG/L
GLOBULIN UR ELPH-MCNC: 3.6 GM/DL
GLUCOSE SERPL-MCNC: 152 MG/DL (ref 65–99)
HCT VFR BLD AUTO: 38.2 % (ref 37.5–51)
HGB BLD-MCNC: 12.4 G/DL (ref 13–17.7)
HOLD SPECIMEN: NORMAL
HOLD SPECIMEN: NORMAL
IMM GRANULOCYTES # BLD AUTO: 0.03 10*3/MM3 (ref 0–0.05)
IMM GRANULOCYTES NFR BLD AUTO: 0.3 % (ref 0–0.5)
LIPASE SERPL-CCNC: 28 U/L (ref 13–60)
LYMPHOCYTES # BLD AUTO: 1.11 10*3/MM3 (ref 0.7–3.1)
LYMPHOCYTES NFR BLD AUTO: 12.6 % (ref 19.6–45.3)
MAGNESIUM SERPL-MCNC: 1.7 MG/DL (ref 1.6–2.4)
MCH RBC QN AUTO: 29.3 PG (ref 26.6–33)
MCHC RBC AUTO-ENTMCNC: 32.5 G/DL (ref 31.5–35.7)
MCV RBC AUTO: 90.3 FL (ref 79–97)
MONOCYTES # BLD AUTO: 0.72 10*3/MM3 (ref 0.1–0.9)
MONOCYTES NFR BLD AUTO: 8.2 % (ref 5–12)
NEUTROPHILS NFR BLD AUTO: 6.82 10*3/MM3 (ref 1.7–7)
NEUTROPHILS NFR BLD AUTO: 77.2 % (ref 42.7–76)
NRBC BLD AUTO-RTO: 0 /100 WBC (ref 0–0.2)
NT-PROBNP SERPL-MCNC: 740.5 PG/ML (ref 0–900)
PLATELET # BLD AUTO: 306 10*3/MM3 (ref 140–450)
PMV BLD AUTO: 10.5 FL (ref 6–12)
POTASSIUM SERPL-SCNC: 3.6 MMOL/L (ref 3.5–5.2)
PROT SERPL-MCNC: 7.2 G/DL (ref 6–8.5)
RBC # BLD AUTO: 4.23 10*6/MM3 (ref 4.14–5.8)
SODIUM SERPL-SCNC: 140 MMOL/L (ref 136–145)
TROPONIN T DELTA: -2 NG/L
TROPONIN T SERPL HS-MCNC: 35 NG/L
WBC NRBC COR # BLD AUTO: 8.83 10*3/MM3 (ref 3.4–10.8)
WHOLE BLOOD HOLD COAG: NORMAL
WHOLE BLOOD HOLD SPECIMEN: NORMAL

## 2024-06-15 PROCEDURE — 83735 ASSAY OF MAGNESIUM: CPT

## 2024-06-15 PROCEDURE — 93010 ELECTROCARDIOGRAM REPORT: CPT | Performed by: INTERNAL MEDICINE

## 2024-06-15 PROCEDURE — 71045 X-RAY EXAM CHEST 1 VIEW: CPT

## 2024-06-15 PROCEDURE — 84484 ASSAY OF TROPONIN QUANT: CPT

## 2024-06-15 PROCEDURE — 84484 ASSAY OF TROPONIN QUANT: CPT | Performed by: EMERGENCY MEDICINE

## 2024-06-15 PROCEDURE — 93005 ELECTROCARDIOGRAM TRACING: CPT | Performed by: EMERGENCY MEDICINE

## 2024-06-15 PROCEDURE — 83690 ASSAY OF LIPASE: CPT

## 2024-06-15 PROCEDURE — 93005 ELECTROCARDIOGRAM TRACING: CPT

## 2024-06-15 PROCEDURE — 80053 COMPREHEN METABOLIC PANEL: CPT

## 2024-06-15 PROCEDURE — 83880 ASSAY OF NATRIURETIC PEPTIDE: CPT

## 2024-06-15 PROCEDURE — 85025 COMPLETE CBC W/AUTO DIFF WBC: CPT

## 2024-06-15 PROCEDURE — 25010000002 METHYLPREDNISOLONE PER 125 MG: Performed by: EMERGENCY MEDICINE

## 2024-06-15 PROCEDURE — 96374 THER/PROPH/DIAG INJ IV PUSH: CPT

## 2024-06-15 PROCEDURE — 36415 COLL VENOUS BLD VENIPUNCTURE: CPT

## 2024-06-15 PROCEDURE — 99284 EMERGENCY DEPT VISIT MOD MDM: CPT

## 2024-06-15 RX ORDER — PREDNISONE 50 MG/1
50 TABLET ORAL DAILY
Qty: 5 TABLET | Refills: 0 | Status: SHIPPED | OUTPATIENT
Start: 2024-06-15

## 2024-06-15 RX ORDER — ASPIRIN 81 MG/1
324 TABLET, CHEWABLE ORAL ONCE
Status: DISCONTINUED | OUTPATIENT
Start: 2024-06-15 | End: 2024-06-16 | Stop reason: HOSPADM

## 2024-06-15 RX ORDER — SODIUM CHLORIDE 0.9 % (FLUSH) 0.9 %
10 SYRINGE (ML) INJECTION AS NEEDED
Status: DISCONTINUED | OUTPATIENT
Start: 2024-06-15 | End: 2024-06-16 | Stop reason: HOSPADM

## 2024-06-15 RX ADMIN — METHYLPREDNISOLONE SODIUM SUCCINATE 125 MG: 125 INJECTION INTRAMUSCULAR; INTRAVENOUS at 23:19

## 2024-06-15 NOTE — ED PROVIDER NOTES
Time: 6:35 PM EDT  Date of encounter:  6/15/2024  Independent Historian/Clinical History and Information was obtained by:   Patient    History is limited by: N/A    Chief Complaint: Chest pain      History of Present Illness:  Patient is a 63 y.o. year old male who presents to the emergency department for evaluation of this weakness for last night.  Also has complaints of worsening chest pain and shortness of breath with walking.   HPI    Patient Care Team  Primary Care Provider: Vishal Jasso MD    Past Medical History:     Allergies   Allergen Reactions    Dilantin [Phenytoin] Rash    Latex Rash     Past Medical History:   Diagnosis Date    Congestive heart failure     Hypertension     Injury of back      Past Surgical History:   Procedure Laterality Date    BACK SURGERY      CARDIAC CATHETERIZATION      CARPAL TUNNEL RELEASE      ORIF FOREARM FRACTURE      PATELLA FRACTURE SURGERY      REPLACEMENT TOTAL KNEE       No family history on file.    Home Medications:  Prior to Admission medications    Medication Sig Start Date End Date Taking? Authorizing Provider   acetaminophen (TYLENOL) 500 MG tablet Take 2 tablets every 6 hours by oral route.    ProviderJr MD   albuterol (ACCUNEB) 1.25 MG/3ML nebulizer solution Take 3 mL by nebulization Every 6 (Six) Hours As Needed for Shortness of Air. 5/31/24   Santosh Howard MD   aspirin 81 MG chewable tablet Chew 81 mg Daily.  Patient not taking: Reported on 6/12/2024    ProviderJr MD   bumetanide (BUMEX) 2 MG tablet Take 1 tablet by mouth Daily.    ProviderJr MD   cephalexin (KEFLEX) 500 MG capsule Take 1 capsule by mouth 2 (Two) Times a Day. 7/11/22   Johann Rubalcava PA-C   cetirizine (zyrTEC) 10 MG tablet Take 1 tablet by mouth Daily.  Patient not taking: Reported on 6/12/2024 6/26/22   Tori Oseguera APRN   doxycycline (MONODOX) 100 MG capsule Take 1 capsule by mouth 2 (Two) Times a Day. 5/31/24   Santosh Howard MD    hydrocortisone 1 % ointment Apply 1 application topically to the appropriate area as directed 2 (Two) Times a Day. 6/26/22   Tori Oseguera APRN   methylPREDNISolone (MEDROL) 4 MG dose pack Take as directed on package instructions. 10/20/23   Dante Mendez PA-C   METOPROLOL SUCCINATE PO Take  by mouth.  Patient not taking: Reported on 6/12/2024    Jr Muniz MD   metoprolol tartrate (LOPRESSOR) 100 MG tablet Take 1 tablet by mouth 2 (Two) Times a Day.    Jr Muniz MD   oxyCODONE-acetaminophen (PERCOCET) 5-325 MG per tablet Take 1 tablet by mouth Every 6 (Six) Hours As Needed.  Patient not taking: Reported on 6/12/2024    Jr Muniz MD   sodium-potassium-magnesium sulfates (SUPREP) 17.5-3.13-1.6 GM/177ML solution oral solution Take as directed 6/12/24   Cali Moulton MD        Social History:   Social History     Tobacco Use    Smoking status: Former     Current packs/day: 0.50     Types: Cigarettes    Smokeless tobacco: Never   Vaping Use    Vaping status: Never Used   Substance Use Topics    Alcohol use: Not Currently     Comment: social    Drug use: Never         Review of Systems:  Review of Systems   Respiratory:  Positive for shortness of breath.    Cardiovascular:  Positive for chest pain and leg swelling.        Physical Exam:  There were no vitals taken for this visit.    Physical Exam  Constitutional:       Appearance: Normal appearance.   HENT:      Head: Normocephalic.   Eyes:      Extraocular Movements: Extraocular movements intact.      Conjunctiva/sclera: Conjunctivae normal.   Pulmonary:      Effort: Pulmonary effort is normal.   Abdominal:      General: There is no distension.   Skin:     General: Skin is warm.      Coloration: Skin is not cyanotic.   Neurological:      Mental Status: He is alert and oriented to person, place, and time.   Psychiatric:         Attention and Perception: Attention and perception normal.         Mood and Affect: Mood normal.         ***          Procedures:  Procedures      Medical Decision Making:      Comorbidities that affect care:    {Comorbidities that affect care:37543}    External Notes reviewed:    {External Note review (Optional):62526}      The following orders were placed and all results were independently analyzed by me:  Orders Placed This Encounter   Procedures    XR Chest 1 View    [place] Draw    High Sensitivity Troponin T    Comprehensive Metabolic Panel    Lipase    BNP    Magnesium    CBC Auto Differential    NPO Diet NPO Type: Strict NPO    Undress & Gown    Continuous Pulse Oximetry    Oxygen Therapy- Nasal Cannula; Titrate 1-6 LPM Per SpO2; 90 - 95%    ECG 12 Lead ED Triage Standing Order; Chest Pain    ECG 12 Lead ED Triage Standing Order; Chest Pain    Insert Peripheral IV    CBC & Differential    Green Top (Gel)    Lavender Top    Gold Top - SST    Light Blue Top       Medications Given in the Emergency Department:  Medications   sodium chloride 0.9 % flush 10 mL (has no administration in time range)   aspirin chewable tablet 324 mg (has no administration in time range)        ED Course:    ED Course as of 06/15/24 2247   Sat Babatunde 15, 2024   1836 --- PROVIDER IN TRIAGE NOTE ---    The patient was evaluated by Johann mayberry in triage. Orders were placed and the patient is currently awaiting disposition.    [AJ]      ED Course User Index  [AJ] Johann Rubalcava PA-C       Labs:    Lab Results (last 24 hours)       ** No results found for the last 24 hours. **             Imaging:    No Radiology Exams Resulted Within Past 24 Hours      Differential Diagnosis and Discussion:    {Differentials:24230}    {Independent Review of (Optional):10401}    MDM     Patient was ambulated on room air and maintain a pulse ox at 97%.      Patient Care Considerations:    {Considerations (Optional):35636}      Consultants/Shared Management Plan:    {Shared Management Plan (Optional):59928}    Social Determinants of  Health:    {Social Determinants of Health (Optional):70716}      Disposition and Care Coordination:    {Admission consideration:51673}    {Discharge (Optional):93132}    Final diagnoses:   None        ED Disposition       None            This medical record created using voice recognition software.           have explained the patient´s condition, diagnoses and treatment plan based on the information available to me at this time. I have answered questions and addressed any concerns. The patient has a good  understanding of the patient´s diagnosis, condition, and treatment plan as can be expected at this point. The vital signs have been stable. The patient´s condition is stable and appropriate for discharge from the emergency department.      The patient will pursue further outpatient evaluation with the primary care physician or other designated or consulting physician as outlined in the discharge instructions. They are agreeable to this plan of care and follow-up instructions have been explained in detail. The patient has received these instructions in written format and has expressed an understanding of the discharge instructions. The patient is aware that any significant change in condition or worsening of symptoms should prompt an immediate return to this or the closest emergency department or call to 911.  I have explained discharge medications and the need for follow up with the patient/caretakers. This was also printed in the discharge instructions. Patient was discharged with the following medications and follow up:      Medication List        New Prescriptions      predniSONE 50 MG tablet  Commonly known as: DELTASONE  Take 1 tablet by mouth Daily.               Where to Get Your Medications        These medications were sent to [facility]/pharm[facility] #[id number] - [place] - [address] - [phone]  - [phone]  [address] [place] [address]      Hours: 24-hours Phone: [phone]   predniSONE 50 MG tablet      Vishal Jasso MD  [address] [facility] [address]  [facility] [address]  [phone]    Schedule an appointment as soon as possible for a visit         Final diagnoses:   Chest pain, unspecified type   Dyspnea, unspecified type        ED Disposition       ED Disposition   Discharge     Condition   Stable    Comment   --               This medical record created using voice recognition software.             Terrance Vergara DO  06/23/24 1600

## 2024-06-16 LAB
QT INTERVAL: 350 MS
QT INTERVAL: 352 MS
QTC INTERVAL: 422 MS
QTC INTERVAL: 445 MS

## 2024-06-16 NOTE — ED NOTES
Pt ambulated with a steady gait using cane. Pts O2 between 95-97% on RA while ambulating. Pt expressed some chest tightness. Provider notified.

## 2024-07-16 ENCOUNTER — TELEPHONE (OUTPATIENT)
Dept: SURGERY | Facility: CLINIC | Age: 64
End: 2024-07-16
Payer: OTHER GOVERNMENT

## 2024-07-16 RX ORDER — SIMETHICONE 80 MG
TABLET,CHEWABLE ORAL
Qty: 4 TABLET | Refills: 0 | Status: SHIPPED | OUTPATIENT
Start: 2024-07-16

## 2024-07-16 RX ORDER — PEG-3350, SODIUM SULFATE, SODIUM CHLORIDE, POTASSIUM CHLORIDE, SODIUM ASCORBATE AND ASCORBIC ACID 7.5-2.691G
1000 KIT ORAL ONCE
Qty: 1000 ML | Refills: 0 | Status: SHIPPED | OUTPATIENT
Start: 2024-07-16 | End: 2024-07-16

## 2024-07-16 NOTE — TELEPHONE ENCOUNTER
Patient has been rescheduled to 09-17-24 @ 12:30 pm, scheduled with Ruba in surgery scheduling. Patient wife aware of date/time, and voiced understanding.     Patient wife Lorenza [facility] [address] due to it being $50 at [facility].     April, does the [facility] have Suprep or will you have to send something else in?    Can you send back to me when done so I can [facility]? Thanks.

## 2024-07-16 NOTE — TELEPHONE ENCOUNTER
PATIENT'S WIFE, BERNABE, CALLED.  SHE WANTS TO RESCHEDULE THE COLONOSCOPY ON 08/08/24.    PATIENT HAD NECK SURGERY A MONTH AGO.  HE WENT TO FIRST F/U WITH DOCTOR.  DOCTOR DOESN'T ADVISE HAVING THAT DATE, BECAUSE HE WILL STILL BE IN A NECK BRACE.    SHE ALSO ASKED FOR THE BOWEL PREP PRESCRIPTION TO [facility]ENT TO [facility] ON [address].  SHE WENT TO [facility] AND IT WAS GOING TO COST $57.00.    #[phone]

## 2024-07-16 NOTE — TELEPHONE ENCOUNTER
PATIENT WIFE BERNABE INFORMED MOVIPREP AND GAS X PILLS SENT TO [facility]. ALSO SENDING OUT NEW BOWEL PREP. PATIENT WIFE BERNABE VOICED UNDERSTANDING.

## 2024-09-16 ENCOUNTER — ANESTHESIA EVENT (OUTPATIENT)
Dept: GASTROENTEROLOGY | Facility: HOSPITAL | Age: 64
End: 2024-09-16
Payer: OTHER GOVERNMENT

## 2024-09-17 ENCOUNTER — HOSPITAL ENCOUNTER (OUTPATIENT)
Facility: HOSPITAL | Age: 64
Setting detail: HOSPITAL OUTPATIENT SURGERY
Discharge: HOME | End: 2024-09-17
Attending: SURGERY | Admitting: SURGERY
Payer: OTHER GOVERNMENT

## 2024-09-17 ENCOUNTER — ANESTHESIA (OUTPATIENT)
Dept: GASTROENTEROLOGY | Facility: HOSPITAL | Age: 64
End: 2024-09-17
Payer: OTHER GOVERNMENT

## 2024-09-17 VITALS
OXYGEN SATURATION: 99 % | BODY MASS INDEX: 41.69 KG/M2 | TEMPERATURE: 97.3 F | SYSTOLIC BLOOD PRESSURE: 149 MMHG | RESPIRATION RATE: 17 BRPM | WEIGHT: 227.96 LBS | HEART RATE: 74 BPM | DIASTOLIC BLOOD PRESSURE: 84 MMHG

## 2024-09-17 DIAGNOSIS — Z12.12 SCREENING FOR COLORECTAL CANCER: ICD-10-CM

## 2024-09-17 DIAGNOSIS — Z12.11 SCREENING FOR COLORECTAL CANCER: ICD-10-CM

## 2024-09-17 PROCEDURE — 88305 TISSUE EXAM BY PATHOLOGIST: CPT | Performed by: SURGERY

## 2024-09-17 PROCEDURE — 25810000003 LACTATED RINGERS PER 1000 ML: Performed by: NURSE ANESTHETIST, CERTIFIED REGISTERED

## 2024-09-17 PROCEDURE — 25010000002 PROPOFOL 10 MG/ML EMULSION

## 2024-09-17 DEVICE — DEV CLIP ENDO RESOLUTION360 CONTRL ROT 235CM: Type: IMPLANTABLE DEVICE | Site: SIGMOID COLON | Status: FUNCTIONAL

## 2024-09-17 RX ORDER — SODIUM CHLORIDE, SODIUM LACTATE, POTASSIUM CHLORIDE, CALCIUM CHLORIDE 600; 310; 30; 20 MG/100ML; MG/100ML; MG/100ML; MG/100ML
30 INJECTION, SOLUTION INTRAVENOUS CONTINUOUS
Status: DISCONTINUED | OUTPATIENT
Start: 2024-09-17 | End: 2024-09-17 | Stop reason: HOSPADM

## 2024-09-17 RX ORDER — PROPOFOL 10 MG/ML
VIAL (ML) INTRAVENOUS CONTINUOUS PRN
Status: DISCONTINUED | OUTPATIENT
Start: 2024-09-17 | End: 2024-09-17 | Stop reason: SURG

## 2024-09-17 RX ORDER — LIDOCAINE HYDROCHLORIDE 20 MG/ML
INJECTION, SOLUTION EPIDURAL; INFILTRATION; INTRACAUDAL; PERINEURAL AS NEEDED
Status: DISCONTINUED | OUTPATIENT
Start: 2024-09-17 | End: 2024-09-17 | Stop reason: SURG

## 2024-09-17 RX ADMIN — LIDOCAINE HYDROCHLORIDE 50 MG: 20 INJECTION, SOLUTION EPIDURAL; INFILTRATION; INTRACAUDAL; PERINEURAL at 13:42

## 2024-09-17 RX ADMIN — PROPOFOL 90 MG: 10 INJECTION, EMULSION INTRAVENOUS at 13:42

## 2024-09-17 RX ADMIN — SODIUM CHLORIDE, POTASSIUM CHLORIDE, SODIUM LACTATE AND CALCIUM CHLORIDE 30 ML/HR: 600; 310; 30; 20 INJECTION, SOLUTION INTRAVENOUS at 13:04

## 2024-09-17 RX ADMIN — PROPOFOL 200 MCG/KG/MIN: 10 INJECTION, EMULSION INTRAVENOUS at 13:44

## 2024-09-19 LAB
CYTO UR: NORMAL
LAB AP CASE REPORT: NORMAL
LAB AP CLINICAL INFORMATION: NORMAL
PATH REPORT.FINAL DX SPEC: NORMAL
PATH REPORT.GROSS SPEC: NORMAL

## 2024-09-27 ENCOUNTER — DOCUMENTATION (OUTPATIENT)
Dept: PHYSICAL THERAPY | Facility: CLINIC | Age: 64
End: 2024-09-27
Payer: OTHER GOVERNMENT

## 2024-10-02 ENCOUNTER — OFFICE VISIT (OUTPATIENT)
Dept: SURGERY | Facility: CLINIC | Age: 64
End: 2024-10-02
Payer: OTHER GOVERNMENT

## 2024-10-02 VITALS
SYSTOLIC BLOOD PRESSURE: 139 MMHG | HEART RATE: 69 BPM | BODY MASS INDEX: 41.77 KG/M2 | DIASTOLIC BLOOD PRESSURE: 81 MMHG | WEIGHT: 227 LBS | HEIGHT: 62 IN

## 2024-10-02 DIAGNOSIS — D36.9 TUBULOVILLOUS ADENOMA: ICD-10-CM

## 2024-10-02 DIAGNOSIS — D36.9 TUBULAR ADENOMA: ICD-10-CM

## 2024-10-02 DIAGNOSIS — Z98.890 S/P COLONOSCOPY WITH POLYPECTOMY: Primary | ICD-10-CM

## 2024-10-02 RX ORDER — PREGABALIN 150 MG/1
CAPSULE ORAL
COMMUNITY

## 2024-10-02 RX ORDER — MUPIROCIN 20 MG/G
OINTMENT TOPICAL
COMMUNITY
Start: 2024-06-12

## 2024-10-02 RX ORDER — CLOBETASOL PROPIONATE 0.5 MG/G
OINTMENT TOPICAL
COMMUNITY

## 2024-10-02 RX ORDER — FOLIC ACID 1 MG/1
1 TABLET ORAL
COMMUNITY
Start: 2024-04-29

## 2024-10-02 RX ORDER — AMLODIPINE BESYLATE 10 MG/1
TABLET ORAL
COMMUNITY
Start: 2023-10-31

## 2024-10-02 NOTE — PROGRESS NOTES
"Chief Complaint: Follow-up    Subjective      Follow-up visit       History of Present Illness  Radames Quinteros is a 63 y.o. male presents to [facility] for follow-up visit.    Patient presents today for follow-up visit after undergoing a colonoscopy on 9/17/2024 performed by Dr. Cali Moulton.  It was with a tubular adenoma of the cecum; tubulovillous adenoma of the sigmoid colon per colonoscopy/pathology.  To close the defect in the sigmoid colon when hemostatic clip was placed.  Resection and retrieval of both polyps were complete.    Results for orders placed or performed during the hospital encounter of 09/17/24   Tissue Pathology Exam    Specimen: A: Large Intestine, Cecum; Tissue    B: Large Intestine, Sigmoid Colon; Tissue   Result Value Ref Range    Case Report       Surgical Pathology Report                         Case: [id number]                                  Authorizing Provider:  Cali Moulton MD  Collected:           09/17/2024 01:48 PM          Ordering Location:     [facility] Received:            09/18/2024 06:12 AM                                 SUITES                                                                       Pathologist:           Tory Dawkins MD                                                     Specimens:   1) - Large Intestine, Cecum, cecum polyp hot snare                                                  2) - Large Intestine, Sigmoid Colon, sigmoid colon polyp hot snare                         Clinical Information       Screening for colorectal cancer      Final Diagnosis       1. Cecum polyp, biopsy:   - Tubular adenoma      2. Sigmoid colon polyp, biopsy:   - Tubulovillous adenoma            Gross Description       1. Large Intestine, Cecum.  Received in formalin labeled \"cecum polyp hot snare\" consists of a single pink-tan soft tissue fragments 0.6 cm.  Specimen submitted entirely cassette 1A.    2. " "Large Intestine, Sigmoid Colon.  Received in formalin labeled \"sigmoid colon polyp hot snare\" consists of a single pink-tan soft tissue fragment 0.9 cm in greatest dimension.  The base of attachment is inked, the specimen is bisected and submitted entirely in cassette 2A.       PF        Microscopic Description       Microscopic examination performed.       Colonoscopy was performed without difficulty.  The patient tolerated the procedure well.    Patient denies any postoperative complications.  Objective     Past Medical History:   Diagnosis Date    Congestive heart failure     Hypertension     Injury of back     Sleep apnea        Past Surgical History:   Procedure Laterality Date    BACK SURGERY      CARDIAC CATHETERIZATION      CARPAL TUNNEL RELEASE      COLONOSCOPY N/A 09/17/2024    Procedure: COLONOSCOPY with hot snare polypectomy with endo clip x1;  Surgeon: Cali Moulton MD;  Location: [facility] END[facility]COPY;  Service: General;  Laterality: N/A;  colon polyps    ORIF FOREARM FRACTURE      PATELLA FRACTURE SURGERY      REPLACEMENT TOTAL KNEE         Outpatient Medications Marked as Taking for the 10/2/24 encounter (Office Visit) with Brayan April, APRN   Medication Sig Dispense Refill    acetaminophen (TYLENOL) 500 MG tablet Take 2 tablets every 6 hours by oral route.      albuterol (ACCUNEB) 1.25 MG/3ML nebulizer solution Take 3 mL by nebulization Every 6 (Six) Hours As Needed for Shortness of Air. 360 mL 0    amLODIPine (NORVASC) 10 MG tablet       bumetanide (BUMEX) 2 MG tablet Take 1 tablet by mouth Daily.      clobetasol (TEMOVATE) 0.05 % ointment       folic acid (FOLVITE) 1 MG tablet Take 1 tablet by mouth.      metoprolol tartrate (LOPRESSOR) 100 MG tablet Take 1 tablet by mouth 2 (Two) Times a Day.      mupirocin (BACTROBAN) 2 % ointment       predniSONE (DELTASONE) 50 MG tablet Take 1 tablet by mouth Daily. 5 tablet 0    pregabalin (LYRICA) 150 MG capsule Take 1 capsule twice a day by oral route " "for 30 days.      simethicone (Gas-X) 80 MG chewable tablet Take 2 tablets PO after completing movi prep and 2 tablets PO 4 hours prior to procedure. 4 tablet 0       Allergies   Allergen Reactions    Ropinirole Anaphylaxis and Rash     rash    Silicone Anaphylaxis and Rash     rash    Varenicline Other (See Comments)     per patient report    Adhesive Tape Rash    Amitriptyline Dermatitis and Rash     rash    Dilantin [Phenytoin] Rash    Latex Rash    Tape Rash        History reviewed. No pertinent family history.    Social History     Socioeconomic History    Marital status:    Tobacco Use    Smoking status: Former     Current packs/day: 0.00     Average packs/day: 0.5 packs/day for 43.8 years (21.9 ttl pk-yrs)     Types: Cigarettes     Start date: 1978     Quit date: 2022     Years since quittin.4    Smokeless tobacco: Never   Vaping Use    Vaping status: Never Used   Substance and Sexual Activity    Alcohol use: Not Currently     Comment: social    Drug use: Never    Sexual activity: Not Currently     Partners: Female       Review of Systems   Constitutional:  Negative for chills and fever.   Gastrointestinal:  Negative for abdominal distention, abdominal pain, anal bleeding, blood in stool, constipation, diarrhea and rectal pain.        Vital Signs:   /81 (BP Location: Right arm, Patient Position: Sitting, Cuff Size: Adult)   Pulse 69   Ht 157.5 cm (62\")   Wt 103 kg (227 lb)   BMI 41.52 kg/m²      Physical Exam  Vitals and nursing note reviewed.   Constitutional:       General: He is not in acute distress.     Appearance: Normal appearance. He is not ill-appearing.   HENT:      Head: Normocephalic and atraumatic.   Cardiovascular:      Rate and Rhythm: Normal rate.   Pulmonary:      Effort: Pulmonary effort is normal.   Abdominal:      Palpations: Abdomen is soft.      Tenderness: There is no guarding.   Musculoskeletal:         General: No deformity. Normal range of motion. "   Skin:     General: Skin is warm and dry.      Coloration: Skin is not jaundiced.   Neurological:      General: No focal deficit present.      Mental Status: He is alert and oriented to person, place, and time.   Psychiatric:         Mood and Affect: Mood normal.         Thought Content: Thought content normal.          Result Review :         []  Laboratory  []  Radiology  []  Pathology  []  Microbiology  []  EKG/Telemetry   []  Cardiology/Vascular   []  Old records  Today reviewed Dr. Moulton's operative and pathology report.     Assessment and Plan    Diagnoses and all orders for this visit:    1. S/P colonoscopy with polypectomy (Primary)    2. Tubulovillous adenoma    3. Tubular adenoma        Follow Up   Return for Rescreen colon in 2 years; follow-up in the interim as needed.    Avoid high fat diets    Increase fiber intake    Per AGA guidelines patient will follow-up for colonoscopy surveillance as directed.    Patient was given instructions and counseling regarding his condition or for health maintenance advice. Please see specific information pulled into the AVS if appropriate.     Thank you for allowing me to participate in the care of this patient. Please call with questions or concerns.

## 2025-01-02 ENCOUNTER — APPOINTMENT (OUTPATIENT)
Dept: CT IMAGING | Facility: HOSPITAL | Age: 65
End: 2025-01-02
Payer: OTHER GOVERNMENT

## 2025-01-02 ENCOUNTER — HOSPITAL ENCOUNTER (INPATIENT)
Facility: HOSPITAL | Age: 65
LOS: 7 days | Discharge: HOME | End: 2025-01-09
Attending: EMERGENCY MEDICINE | Admitting: INTERNAL MEDICINE
Payer: OTHER GOVERNMENT

## 2025-01-02 ENCOUNTER — APPOINTMENT (OUTPATIENT)
Dept: GENERAL RADIOLOGY | Facility: HOSPITAL | Age: 65
End: 2025-01-02
Payer: OTHER GOVERNMENT

## 2025-01-02 ENCOUNTER — APPOINTMENT (OUTPATIENT)
Dept: CARDIOLOGY | Facility: HOSPITAL | Age: 65
End: 2025-01-02
Payer: OTHER GOVERNMENT

## 2025-01-02 DIAGNOSIS — J96.01 ACUTE RESPIRATORY FAILURE WITH HYPOXIA: Primary | ICD-10-CM

## 2025-01-02 DIAGNOSIS — B33.8 RSV (RESPIRATORY SYNCYTIAL VIRUS INFECTION): ICD-10-CM

## 2025-01-02 PROBLEM — R06.02 SOB (SHORTNESS OF BREATH): Status: ACTIVE | Noted: 2025-01-02

## 2025-01-02 LAB
ALBUMIN SERPL-MCNC: 4 G/DL (ref 3.5–5.2)
ALBUMIN/GLOB SERPL: 1.1 G/DL
ALP SERPL-CCNC: 94 U/L (ref 39–117)
ALT SERPL W P-5'-P-CCNC: 21 U/L (ref 1–41)
ANION GAP SERPL CALCULATED.3IONS-SCNC: 14.3 MMOL/L (ref 5–15)
AST SERPL-CCNC: 30 U/L (ref 1–40)
BASOPHILS # BLD AUTO: 0.04 10*3/MM3 (ref 0–0.2)
BASOPHILS NFR BLD AUTO: 0.6 % (ref 0–1.5)
BILIRUB SERPL-MCNC: 0.8 MG/DL (ref 0–1.2)
BUN SERPL-MCNC: 18 MG/DL (ref 8–23)
BUN/CREAT SERPL: 14.5 (ref 7–25)
CALCIUM SPEC-SCNC: 9 MG/DL (ref 8.6–10.5)
CHLORIDE SERPL-SCNC: 102 MMOL/L (ref 98–107)
CO2 SERPL-SCNC: 19.7 MMOL/L (ref 22–29)
CREAT SERPL-MCNC: 1.24 MG/DL (ref 0.76–1.27)
D-LACTATE SERPL-SCNC: 1.4 MMOL/L (ref 0.5–2)
D-LACTATE SERPL-SCNC: 2.4 MMOL/L (ref 0.5–2)
DEPRECATED RDW RBC AUTO: 46.2 FL (ref 37–54)
EGFRCR SERPLBLD CKD-EPI 2021: 64.9 ML/MIN/1.73
EOSINOPHIL # BLD AUTO: 0 10*3/MM3 (ref 0–0.4)
EOSINOPHIL NFR BLD AUTO: 0 % (ref 0.3–6.2)
ERYTHROCYTE [DISTWIDTH] IN BLOOD BY AUTOMATED COUNT: 14.2 % (ref 12.3–15.4)
FLUAV SUBTYP SPEC NAA+PROBE: NOT DETECTED
FLUBV RNA ISLT QL NAA+PROBE: NOT DETECTED
GEN 5 1HR TROPONIN T REFLEX: 22 NG/L
GLOBULIN UR ELPH-MCNC: 3.7 GM/DL
GLUCOSE SERPL-MCNC: 126 MG/DL (ref 65–99)
HCT VFR BLD AUTO: 48.6 % (ref 37.5–51)
HGB BLD-MCNC: 15.7 G/DL (ref 13–17.7)
HOLD SPECIMEN: NORMAL
HOLD SPECIMEN: NORMAL
IMM GRANULOCYTES # BLD AUTO: 0.01 10*3/MM3 (ref 0–0.05)
IMM GRANULOCYTES NFR BLD AUTO: 0.1 % (ref 0–0.5)
LYMPHOCYTES # BLD AUTO: 0.44 10*3/MM3 (ref 0.7–3.1)
LYMPHOCYTES NFR BLD AUTO: 6.6 % (ref 19.6–45.3)
MCH RBC QN AUTO: 29 PG (ref 26.6–33)
MCHC RBC AUTO-ENTMCNC: 32.3 G/DL (ref 31.5–35.7)
MCV RBC AUTO: 89.8 FL (ref 79–97)
MONOCYTES # BLD AUTO: 0.28 10*3/MM3 (ref 0.1–0.9)
MONOCYTES NFR BLD AUTO: 4.2 % (ref 5–12)
NEUTROPHILS NFR BLD AUTO: 5.92 10*3/MM3 (ref 1.7–7)
NEUTROPHILS NFR BLD AUTO: 88.5 % (ref 42.7–76)
NRBC BLD AUTO-RTO: 0 /100 WBC (ref 0–0.2)
NT-PROBNP SERPL-MCNC: 692 PG/ML (ref 0–900)
PLATELET # BLD AUTO: 191 10*3/MM3 (ref 140–450)
PMV BLD AUTO: 10.8 FL (ref 6–12)
POTASSIUM SERPL-SCNC: 4.9 MMOL/L (ref 3.5–5.2)
PROT SERPL-MCNC: 7.7 G/DL (ref 6–8.5)
QT INTERVAL: 403 MS
QTC INTERVAL: 467 MS
RBC # BLD AUTO: 5.41 10*6/MM3 (ref 4.14–5.8)
RSV RNA NPH QL NAA+NON-PROBE: DETECTED
SARS-COV-2 RNA RESP QL NAA+PROBE: NOT DETECTED
SODIUM SERPL-SCNC: 136 MMOL/L (ref 136–145)
TROPONIN T % DELTA: -4 %
TROPONIN T NUMERIC DELTA: -1 NG/L
TROPONIN T SERPL HS-MCNC: 23 NG/L
WBC NRBC COR # BLD AUTO: 6.69 10*3/MM3 (ref 3.4–10.8)
WHOLE BLOOD HOLD COAG: NORMAL
WHOLE BLOOD HOLD SPECIMEN: NORMAL

## 2025-01-02 PROCEDURE — 25510000001 PERFLUTREN 6.52 MG/ML SUSPENSION 2 ML VIAL: Performed by: EMERGENCY MEDICINE

## 2025-01-02 PROCEDURE — 25010000002 PIPERACILLIN SOD-TAZOBACTAM PER 1 G: Performed by: STUDENT IN AN ORGANIZED HEALTH CARE EDUCATION/TRAINING PROGRAM

## 2025-01-02 PROCEDURE — 84484 ASSAY OF TROPONIN QUANT: CPT

## 2025-01-02 PROCEDURE — 25510000001 IOPAMIDOL PER 1 ML: Performed by: EMERGENCY MEDICINE

## 2025-01-02 PROCEDURE — 85025 COMPLETE CBC W/AUTO DIFF WBC: CPT | Performed by: EMERGENCY MEDICINE

## 2025-01-02 PROCEDURE — 83880 ASSAY OF NATRIURETIC PEPTIDE: CPT | Performed by: EMERGENCY MEDICINE

## 2025-01-02 PROCEDURE — 87637 SARSCOV2&INF A&B&RSV AMP PRB: CPT

## 2025-01-02 PROCEDURE — 71045 X-RAY EXAM CHEST 1 VIEW: CPT

## 2025-01-02 PROCEDURE — 80053 COMPREHEN METABOLIC PANEL: CPT

## 2025-01-02 PROCEDURE — 36415 COLL VENOUS BLD VENIPUNCTURE: CPT | Performed by: EMERGENCY MEDICINE

## 2025-01-02 PROCEDURE — 93005 ELECTROCARDIOGRAM TRACING: CPT | Performed by: EMERGENCY MEDICINE

## 2025-01-02 PROCEDURE — 83605 ASSAY OF LACTIC ACID: CPT

## 2025-01-02 PROCEDURE — 93306 TTE W/DOPPLER COMPLETE: CPT | Performed by: STUDENT IN AN ORGANIZED HEALTH CARE EDUCATION/TRAINING PROGRAM

## 2025-01-02 PROCEDURE — 93306 TTE W/DOPPLER COMPLETE: CPT

## 2025-01-02 PROCEDURE — 94799 UNLISTED PULMONARY SVC/PX: CPT

## 2025-01-02 PROCEDURE — 94640 AIRWAY INHALATION TREATMENT: CPT

## 2025-01-02 PROCEDURE — 99223 1ST HOSP IP/OBS HIGH 75: CPT | Performed by: STUDENT IN AN ORGANIZED HEALTH CARE EDUCATION/TRAINING PROGRAM

## 2025-01-02 PROCEDURE — 87040 BLOOD CULTURE FOR BACTERIA: CPT | Performed by: EMERGENCY MEDICINE

## 2025-01-02 PROCEDURE — 93005 ELECTROCARDIOGRAM TRACING: CPT

## 2025-01-02 PROCEDURE — 83605 ASSAY OF LACTIC ACID: CPT | Performed by: EMERGENCY MEDICINE

## 2025-01-02 PROCEDURE — 36415 COLL VENOUS BLD VENIPUNCTURE: CPT

## 2025-01-02 PROCEDURE — 99291 CRITICAL CARE FIRST HOUR: CPT

## 2025-01-02 PROCEDURE — 71275 CT ANGIOGRAPHY CHEST: CPT

## 2025-01-02 PROCEDURE — 25010000002 METHYLPREDNISOLONE PER 125 MG: Performed by: EMERGENCY MEDICINE

## 2025-01-02 RX ORDER — FUROSEMIDE 10 MG/ML
40 INJECTION INTRAMUSCULAR; INTRAVENOUS EVERY 12 HOURS
Status: DISCONTINUED | OUTPATIENT
Start: 2025-01-02 | End: 2025-01-06

## 2025-01-02 RX ORDER — METHYLPREDNISOLONE SODIUM SUCCINATE 40 MG/ML
40 INJECTION, POWDER, LYOPHILIZED, FOR SOLUTION INTRAMUSCULAR; INTRAVENOUS EVERY 8 HOURS
Status: DISCONTINUED | OUTPATIENT
Start: 2025-01-02 | End: 2025-01-03

## 2025-01-02 RX ORDER — SODIUM CHLORIDE 9 MG/ML
40 INJECTION, SOLUTION INTRAVENOUS AS NEEDED
Status: DISCONTINUED | OUTPATIENT
Start: 2025-01-02 | End: 2025-01-09 | Stop reason: HOSPADM

## 2025-01-02 RX ORDER — BUMETANIDE 1 MG/1
2 TABLET ORAL DAILY
Status: DISCONTINUED | OUTPATIENT
Start: 2025-01-03 | End: 2025-01-02

## 2025-01-02 RX ORDER — POLYETHYLENE GLYCOL 3350 17 G/17G
17 POWDER, FOR SOLUTION ORAL DAILY PRN
Status: DISCONTINUED | OUTPATIENT
Start: 2025-01-02 | End: 2025-01-09 | Stop reason: HOSPADM

## 2025-01-02 RX ORDER — AMLODIPINE BESYLATE 5 MG/1
5 TABLET ORAL
Status: DISCONTINUED | OUTPATIENT
Start: 2025-01-03 | End: 2025-01-03

## 2025-01-02 RX ORDER — AMOXICILLIN 250 MG
2 CAPSULE ORAL 2 TIMES DAILY PRN
Status: DISCONTINUED | OUTPATIENT
Start: 2025-01-02 | End: 2025-01-09 | Stop reason: HOSPADM

## 2025-01-02 RX ORDER — ONDANSETRON 2 MG/ML
4 INJECTION INTRAMUSCULAR; INTRAVENOUS EVERY 6 HOURS PRN
Status: DISCONTINUED | OUTPATIENT
Start: 2025-01-02 | End: 2025-01-03

## 2025-01-02 RX ORDER — ACETAMINOPHEN 325 MG/1
650 TABLET ORAL EVERY 6 HOURS PRN
Status: DISCONTINUED | OUTPATIENT
Start: 2025-01-02 | End: 2025-01-03

## 2025-01-02 RX ORDER — DOXYCYCLINE 100 MG/1
100 CAPSULE ORAL EVERY 12 HOURS SCHEDULED
Status: COMPLETED | OUTPATIENT
Start: 2025-01-02 | End: 2025-01-07

## 2025-01-02 RX ORDER — METHYLPREDNISOLONE SODIUM SUCCINATE 125 MG/2ML
125 INJECTION, POWDER, LYOPHILIZED, FOR SOLUTION INTRAMUSCULAR; INTRAVENOUS ONCE
Status: COMPLETED | OUTPATIENT
Start: 2025-01-02 | End: 2025-01-02

## 2025-01-02 RX ORDER — METOPROLOL TARTRATE 50 MG
100 TABLET ORAL 2 TIMES DAILY
Status: DISCONTINUED | OUTPATIENT
Start: 2025-01-02 | End: 2025-01-03

## 2025-01-02 RX ORDER — BISACODYL 10 MG
10 SUPPOSITORY, RECTAL RECTAL DAILY PRN
Status: DISCONTINUED | OUTPATIENT
Start: 2025-01-02 | End: 2025-01-09 | Stop reason: HOSPADM

## 2025-01-02 RX ORDER — BISACODYL 5 MG/1
5 TABLET, DELAYED RELEASE ORAL DAILY PRN
Status: DISCONTINUED | OUTPATIENT
Start: 2025-01-02 | End: 2025-01-09 | Stop reason: HOSPADM

## 2025-01-02 RX ORDER — HYDRALAZINE HYDROCHLORIDE 20 MG/ML
10 INJECTION INTRAMUSCULAR; INTRAVENOUS EVERY 6 HOURS PRN
Status: DISCONTINUED | OUTPATIENT
Start: 2025-01-02 | End: 2025-01-03

## 2025-01-02 RX ORDER — SODIUM CHLORIDE 0.9 % (FLUSH) 0.9 %
10 SYRINGE (ML) INJECTION AS NEEDED
Status: DISCONTINUED | OUTPATIENT
Start: 2025-01-02 | End: 2025-01-09 | Stop reason: HOSPADM

## 2025-01-02 RX ORDER — IOPAMIDOL 755 MG/ML
100 INJECTION, SOLUTION INTRAVASCULAR
Status: COMPLETED | OUTPATIENT
Start: 2025-01-03 | End: 2025-01-02

## 2025-01-02 RX ORDER — IPRATROPIUM BROMIDE AND ALBUTEROL SULFATE 2.5; .5 MG/3ML; MG/3ML
3 SOLUTION RESPIRATORY (INHALATION)
Status: DISCONTINUED | OUTPATIENT
Start: 2025-01-02 | End: 2025-01-03

## 2025-01-02 RX ORDER — SODIUM CHLORIDE 0.9 % (FLUSH) 0.9 %
10 SYRINGE (ML) INJECTION AS NEEDED
Status: DISCONTINUED | OUTPATIENT
Start: 2025-01-02 | End: 2025-01-07

## 2025-01-02 RX ORDER — GUAIFENESIN 600 MG/1
600 TABLET, EXTENDED RELEASE ORAL EVERY 12 HOURS SCHEDULED
Status: DISCONTINUED | OUTPATIENT
Start: 2025-01-02 | End: 2025-01-04

## 2025-01-02 RX ORDER — IPRATROPIUM BROMIDE AND ALBUTEROL SULFATE 2.5; .5 MG/3ML; MG/3ML
3 SOLUTION RESPIRATORY (INHALATION) ONCE
Status: COMPLETED | OUTPATIENT
Start: 2025-01-02 | End: 2025-01-02

## 2025-01-02 RX ORDER — SODIUM CHLORIDE 0.9 % (FLUSH) 0.9 %
10 SYRINGE (ML) INJECTION EVERY 12 HOURS SCHEDULED
Status: DISCONTINUED | OUTPATIENT
Start: 2025-01-02 | End: 2025-01-09 | Stop reason: HOSPADM

## 2025-01-02 RX ORDER — NITROGLYCERIN 0.4 MG/1
0.4 TABLET SUBLINGUAL
Status: DISCONTINUED | OUTPATIENT
Start: 2025-01-02 | End: 2025-01-09 | Stop reason: HOSPADM

## 2025-01-02 RX ADMIN — IOPAMIDOL 100 ML: 755 INJECTION, SOLUTION INTRAVENOUS at 23:43

## 2025-01-02 RX ADMIN — GUAIFENESIN 600 MG: 600 TABLET ORAL at 22:56

## 2025-01-02 RX ADMIN — METHYLPREDNISOLONE SODIUM SUCCINATE 125 MG: 125 INJECTION, POWDER, FOR SOLUTION INTRAMUSCULAR; INTRAVENOUS at 21:37

## 2025-01-02 RX ADMIN — DOXYCYCLINE 100 MG: 100 CAPSULE ORAL at 22:56

## 2025-01-02 RX ADMIN — PIPERACILLIN AND TAZOBACTAM 4.5 G: 4; .5 INJECTION, POWDER, FOR SOLUTION INTRAVENOUS; PARENTERAL at 22:56

## 2025-01-02 RX ADMIN — SODIUM CHLORIDE 10 ML: 9 INJECTION INTRAMUSCULAR; INTRAVENOUS; SUBCUTANEOUS at 23:35

## 2025-01-02 RX ADMIN — IPRATROPIUM BROMIDE AND ALBUTEROL SULFATE 3 ML: .5; 3 SOLUTION RESPIRATORY (INHALATION) at 21:07

## 2025-01-02 NOTE — ED PROVIDER NOTES
Time: 6:07 PM EST  Date of encounter:  1/2/2025  Independent Historian/Clinical History and Information was obtained by:   Patient and Family    History is limited by: N/A    Chief Complaint: Shortness of breath and cough      History of Present Illness:  Patient is a 64 y.o. year old male who presents to the emergency department for evaluation of breath x 1 week.  Patient has prior medical history consistent for CHF currently on diuretic as needed.  Patient reports he has been having worsening shortness of breath and cough over the past week.  deneis any known fevers.  Reports that swelling in his legs may be worse he is not for sure.  Denies any chest pain.  Denies antibiotics in last 30 days.  Denies recent hospitalizations.      Patient Care Team  Primary Care Provider: Vishal Jasso MD    Past Medical History:     Allergies   Allergen Reactions    Ropinirole Anaphylaxis and Rash     rash    Silicone Anaphylaxis and Rash     rash    Varenicline Other (See Comments)     per patient report    Adhesive Tape Rash    Amitriptyline Dermatitis and Rash     rash    Dilantin [Phenytoin] Rash    Latex Rash    Tape Rash     Past Medical History:   Diagnosis Date    Congestive heart failure     Hypertension     Injury of back     Sleep apnea      Past Surgical History:   Procedure Laterality Date    BACK SURGERY      CARDIAC CATHETERIZATION      CARPAL TUNNEL RELEASE      COLONOSCOPY N/A 09/17/2024    Procedure: COLONOSCOPY with hot snare polypectomy with endo clip x1;  Surgeon: Cali Moulton MD;  Location: [facility] ENDOSCOPY;  Service: General;  Laterality: N/A;  colon polyps    ORIF FOREARM FRACTURE      PATELLA FRACTURE SURGERY      REPLACEMENT TOTAL KNEE       History reviewed. No pertinent family history.    Home Medications:  Prior to Admission medications    Medication Sig Start Date End Date Taking? Authorizing Provider   acetaminophen (TYLENOL) 500 MG tablet Take 2 tablets every 6 hours by oral route.     Jr Muniz MD   albuterol (ACCUNEB) 1.25 MG/3ML nebulizer solution Take 3 mL by nebulization Every 6 (Six) Hours As Needed for Shortness of Air. 24   Santosh Howard MD   amLODIPine (NORVASC) 10 MG tablet  10/31/23   Jr Muniz MD   aspirin 81 MG chewable tablet Chew 81 mg Daily.  Patient not taking: Reported on 2024    Jr Muniz MD   bumetanide (BUMEX) 2 MG tablet Take 1 tablet by mouth Daily.    Jr Muniz MD   cetirizine (zyrTEC) 10 MG tablet Take 1 tablet by mouth Daily.  Patient not taking: Reported on 2024   Tori Oseguera APRN   clobetasol (TEMOVATE) 0.05 % ointment     Jr Muniz MD   folic acid (FOLVITE) 1 MG tablet Take 1 tablet by mouth. 24   Jr Muniz MD   METOPROLOL SUCCINATE PO Take  by mouth.  Patient not taking: Reported on 2024    Jr Muniz MD   metoprolol tartrate (LOPRESSOR) 100 MG tablet Take 1 tablet by mouth 2 (Two) Times a Day.    Jr Muniz MD   mupirocin (BACTROBAN) 2 % ointment  24   Jr Muniz MD   oxyCODONE-acetaminophen (PERCOCET) 5-325 MG per tablet Take 1 tablet by mouth Every 6 (Six) Hours As Needed.  Patient not taking: Reported on 2024    Jr Muniz MD   predniSONE (DELTASONE) 50 MG tablet Take 1 tablet by mouth Daily. 6/15/24   Terrance Vergara DO   pregabalin (LYRICA) 150 MG capsule Take 1 capsule twice a day by oral route for 30 days.    Jr Muniz MD   simethicone (Gas-X) 80 MG chewable tablet Take 2 tablets PO after completing movi prep and 2 tablets PO 4 hours prior to procedure. 24   Ely Schmidt, APRCYNTHIA        Social History:   Social History     Tobacco Use    Smoking status: Former     Current packs/day: 0.00     Average packs/day: 0.5 packs/day for 43.8 years (21.9 ttl pk-yrs)     Types: Cigarettes     Start date: 1978     Quit date: 2022     Years since quittin.7    Smokeless tobacco: Never  "  Vaping Use    Vaping status: Never Used   Substance Use Topics    Alcohol use: Not Currently     Comment: social    Drug use: Never         Review of Systems:  Review of Systems   Constitutional:  Negative for chills and fever.   HENT:  Negative for congestion, rhinorrhea and sore throat.    Eyes:  Negative for pain and visual disturbance.   Respiratory:  Positive for cough, shortness of breath and wheezing. Negative for apnea and chest tightness.    Cardiovascular:  Negative for chest pain and palpitations.   Gastrointestinal:  Negative for abdominal pain, diarrhea, nausea and vomiting.   Genitourinary:  Negative for difficulty urinating and dysuria.   Musculoskeletal:  Negative for joint swelling and myalgias.   Skin:  Negative for color change.   Neurological:  Negative for seizures and headaches.   Psychiatric/Behavioral: Negative.     All other systems reviewed and are negative.       Physical Exam:  /93   Pulse 76   Temp 97.9 °F (36.6 °C) (Oral)   Resp 22   Ht 157.5 cm (62\")   Wt 106 kg (233 lb 0.4 oz)   SpO2 92%   BMI 42.62 kg/m²     Physical Exam  Vitals and nursing note reviewed.   Constitutional:       General: He is in acute distress.      Appearance: Normal appearance. He is not toxic-appearing.   HENT:      Head: Normocephalic and atraumatic.      Jaw: There is normal jaw occlusion.   Eyes:      General: Lids are normal.      Extraocular Movements: Extraocular movements intact.      Conjunctiva/sclera: Conjunctivae normal.      Pupils: Pupils are equal, round, and reactive to light.   Cardiovascular:      Rate and Rhythm: Normal rate and regular rhythm.      Pulses: Normal pulses.      Heart sounds: Normal heart sounds.   Pulmonary:      Effort: Pulmonary effort is normal. Tachypnea present. No respiratory distress.      Breath sounds: Examination of the right-upper field reveals rhonchi. Examination of the left-upper field reveals rhonchi. Wheezing and rhonchi present.   Abdominal:      " General: Abdomen is flat. There is no distension.      Palpations: Abdomen is soft.      Tenderness: There is no abdominal tenderness. There is no guarding or rebound.   Musculoskeletal:         General: Normal range of motion.      Cervical back: Normal range of motion and neck supple.      Right lower leg: No edema.      Left lower leg: No edema.   Skin:     General: Skin is warm and dry.      Coloration: Skin is not cyanotic.   Neurological:      Mental Status: He is alert and oriented to person, place, and time. Mental status is at baseline.   Psychiatric:         Attention and Perception: Attention and perception normal.         Mood and Affect: Mood normal.                    Medical Decision Making:      Comorbidities that affect care:    Hypertension, CHF    External Notes reviewed:    None      The following orders were placed and all results were independently analyzed by me:  Orders Placed This Encounter   Procedures    Blood Culture - Blood,    Blood Culture - Blood,    COVID PRE-OP / PRE-PROCEDURE SCREENING ORDER (NO ISOLATION) - Swab, Nasopharynx    COVID-19, FLU A/B, RSV PCR 1 HR TAT - Swab, Nasopharynx    XR Chest 1 View    [place] Draw    Comprehensive Metabolic Panel    BNP    High Sensitivity Troponin T    Lactic Acid, Plasma    CBC Auto Differential    High Sensitivity Troponin T 1Hr    STAT Lactic Acid, Reflex    NPO Diet NPO Type: Strict NPO    Undress & Gown    Continuous Pulse Oximetry    Vital Signs    Inpatient Hospitalist Consult    Oxygen Therapy- Nasal Cannula; Titrate 1-6 LPM Per SpO2; 90 - 95%    ECG 12 Lead ED Triage Standing Order; SOA    Insert Peripheral IV    CBC & Differential    Green Top (Gel)    Lavender Top    Gold Top - SST    Light Blue Top       Medications Given in the Emergency Department:  Medications   sodium chloride 0.9 % flush 10 mL (has no administration in time range)   methylPREDNISolone sodium succinate (SOLU-Medrol) injection 125 mg (has no administration in  time range)   ipratropium-albuterol (DUO-NEB) nebulizer solution 3 mL (3 mL Nebulization Given 1/2/25 2107)        ED Course:         Labs:    Lab Results (last 24 hours)       Procedure Component Value Units Date/Time    CBC & Differential [[id number]]  (Abnormal) Collected: 01/02/25 1823    Specimen: Blood from Arm, Right Updated: 01/02/25 1841    Narrative:      The following orders were created for panel order CBC & Differential.  Procedure                               Abnormality         Status                     ---------                               -----------         ------                     CBC Auto Differential[[id number]]        Abnormal            Final result                 Please view results for these tests on the individual orders.    Comprehensive Metabolic Panel [[id number]]  (Abnormal) Collected: 01/02/25 1823    Specimen: Blood from Arm, Right Updated: 01/02/25 1922     Glucose 126 mg/dL      BUN 18 mg/dL      Creatinine 1.24 mg/dL      Sodium 136 mmol/L      Potassium 4.9 mmol/L      Chloride 102 mmol/L      CO2 19.7 mmol/L      Calcium 9.0 mg/dL      Total Protein 7.7 g/dL      Albumin 4.0 g/dL      ALT (SGPT) 21 U/L      AST (SGOT) 30 U/L      Alkaline Phosphatase 94 U/L      Total Bilirubin 0.8 mg/dL      Globulin 3.7 gm/dL      A/G Ratio 1.1 g/dL      BUN/Creatinine Ratio 14.5     Anion Gap 14.3 mmol/L      eGFR 64.9 mL/min/1.73     Narrative:      GFR Categories in Chronic Kidney Disease (CKD)      GFR Category          GFR (mL/min/1.73)    Interpretation  G1                     90 or greater         Normal or high (1)  G2                      60-89                Mild decrease (1)  G3a                   45-59                Mild to moderate decrease  G3b                   30-44                Moderate to severe decrease  G4                    15-29                Severe decrease  G5                    14 or less           Kidney failure          (1)In the absence of evidence of kidney  disease, neither GFR category G1 or G2 fulfill the criteria for CKD.    eGFR calculation 2021 CKD-EPI creatinine equation, which does not include race as a factor    BNP [[id number]]  (Normal) Collected: 01/02/25 1823    Specimen: Blood from Arm, Right Updated: 01/02/25 1914     proBNP 692.0 pg/mL     Narrative:      This assay is used as an aid in the diagnosis of individuals suspected of having heart failure. It can be used as an aid in the diagnosis of acute decompensated heart failure (ADHF) in patients presenting with signs and symptoms of ADHF to the emergency department (ED). In addition, NT-proBNP of <300 pg/mL indicates ADHF is not likely.    Age Range Result Interpretation  NT-proBNP Concentration (pg/mL:      <50             Positive            >450                   Gray                 300-450                    Negative             <300    50-75           Positive            >900                  Gray                300-900                  Negative            <300      >75             Positive            >1800                  Gray                300-1800                  Negative            <300    High Sensitivity Troponin T [[id number]]  (Abnormal) Collected: 01/02/25 1823    Specimen: Blood from Arm, Right Updated: 01/02/25 1922     HS Troponin T 23 ng/L     Narrative:      High Sensitive Troponin T Reference Range:  <14.0 ng/L- Negative Female for AMI  <22.0 ng/L- Negative Male for AMI  >=14 - Abnormal Female indicating possible myocardial injury.  >=22 - Abnormal Male indicating possible myocardial injury.   Clinicians would have to utilize clinical acumen, EKG, Troponin, and serial changes to determine if it is an Acute Myocardial Infarction or myocardial injury due to an underlying chronic condition.         Blood Culture - Blood, Arm, Right [[id number]] Collected: 01/02/25 1823    Specimen: Blood from Arm, Right Updated: 01/02/25 1836    Lactic Acid, Plasma [[id number]]  (Abnormal) Collected:  01/02/25 1823    Specimen: Blood from Arm, Right Updated: 01/02/25 1939     Lactate 2.4 mmol/L     CBC Auto Differential [[id number]]  (Abnormal) Collected: 01/02/25 1823    Specimen: Blood from Arm, Right Updated: 01/02/25 1841     WBC 6.69 10*3/mm3      RBC 5.41 10*6/mm3      Hemoglobin 15.7 g/dL      Hematocrit 48.6 %      MCV 89.8 fL      MCH 29.0 pg      MCHC 32.3 g/dL      RDW 14.2 %      RDW-SD 46.2 fl      MPV 10.8 fL      Platelets 191 10*3/mm3      Neutrophil % 88.5 %      Lymphocyte % 6.6 %      Monocyte % 4.2 %      Eosinophil % 0.0 %      Basophil % 0.6 %      Immature Grans % 0.1 %      Neutrophils, Absolute 5.92 10*3/mm3      Lymphocytes, Absolute 0.44 10*3/mm3      Monocytes, Absolute 0.28 10*3/mm3      Eosinophils, Absolute 0.00 10*3/mm3      Basophils, Absolute 0.04 10*3/mm3      Immature Grans, Absolute 0.01 10*3/mm3      nRBC 0.0 /100 WBC     COVID PRE-OP / PRE-PROCEDURE SCREENING ORDER (NO ISOLATION) - Swab, Nasopharynx [[id number]]  (Abnormal) Collected: 01/02/25 1852    Specimen: Swab from Nasopharynx Updated: 01/02/25 1946    Narrative:      The following orders were created for panel order COVID PRE-OP / PRE-PROCEDURE SCREENING ORDER (NO ISOLATION) - Swab, Nasopharynx.  Procedure                               Abnormality         Status                     ---------                               -----------         ------                     COVID-19, FLU A/B, RSV P...[[id number]]  Abnormal            Final result                 Please view results for these tests on the individual orders.    COVID-19, FLU A/B, RSV PCR 1 HR TAT - Swab, Nasopharynx [[id number]]  (Abnormal) Collected: 01/02/25 1852    Specimen: Swab from Nasopharynx Updated: 01/02/25 1946     COVID19 Not Detected     Influenza A PCR Not Detected     Influenza B PCR Not Detected     RSV, PCR Detected    Narrative:      Fact sheet for providers: https://www.fda.gov/media/360467/download    Fact sheet for patients:  https://www.fda.gov/media/826462/download    Test performed by PCR.    High Sensitivity Troponin T 1Hr [[id number]]  (Abnormal) Collected: 01/02/25 1928    Specimen: Blood from Arm, Right Updated: 01/02/25 1955     HS Troponin T 22 ng/L      Troponin T Numeric Delta -1 ng/L      Troponin T % Delta -4 %     Narrative:      High Sensitive Troponin T Reference Range:  <14.0 ng/L- Negative Female for AMI  <22.0 ng/L- Negative Male for AMI  >=14 - Abnormal Female indicating possible myocardial injury.  >=22 - Abnormal Male indicating possible myocardial injury.   Clinicians would have to utilize clinical acumen, EKG, Troponin, and serial changes to determine if it is an Acute Myocardial Infarction or myocardial injury due to an underlying chronic condition.                  Imaging:    XR Chest 1 View    Result Date: 1/2/2025  XR CHEST 1 VW Date of Exam: 1/2/2025 7:15 PM EST Indication: SOA Triage Protocol Comparison: 6/15/2024 Findings: Heart size top normal, stable. Lungs are without consolidation. Negative for pneumothorax or pleural effusion. Excision hardware noted in the lower cervical spine in the lower thoracic and upper lumbar spine partially imaged.     Impression: No acute process. Electronically Signed: Aakash Rogers MD  1/2/2025 8:02 PM EST  Workstation ID: [id number]       Differential Diagnosis and Discussion:    Dyspnea: Differential diagnosis includes but is not limited to metabolic acidosis, neurological disorders, psychogenic, asthma, pneumothorax, upper airway obstruction, COPD, pneumonia, noncardiogenic pulmonary edema, interstitial lung disease, anemia, congestive heart failure, and pulmonary embolism    PROCEDURES:    Labs were collected in the emergency department and all labs were reviewed and interpreted by me.  X-ray were performed in the emergency department and all X-ray impressions were independently interpreted by me.  An EKG was performed and the EKG was interpreted by me.    ECG 12 Lead ED  Triage Standing Order; SOA   Preliminary Result   HEART RATE=81  bpm   RR Vxonzupp=294  ms   [place] Wznhmuzn=705  ms   P Horizontal Axis=-35  deg   P Front Axis=72  deg   QRSD Interval=91  ms   QT Plwujoxu=329  ms   BXqI=620  ms   QRS Axis=15  deg   T Wave Axis=97  deg   - ABNORMAL ECG -   Sinus rhythm   Probable left atrial enlargement   RSR' in V1 or V2, right VCD or RVH   Nonspecific T abnormalities, lateral leads   Date and Time of Study:2025-01-02 18:12:59        My interpretation of EKG shows normal sinus rhythm, normal rate, normal QT, no acute ischemia  Procedures    MDM  Number of Diagnoses or Management Options  Acute respiratory failure with hypoxia  RSV (respiratory syncytial virus infection)  Diagnosis management comments: In summary this is a 64-year-old male patient who presents to the emerged department for evaluation of shortness of breath and cough.  On arrival he was found to be hypoxic on room air and is normally not oxygen requiring.  CBC independently reviewed and interpreted by me and shows no critical abnormalities.  CMP independently reviewed and interpreted by me and shows no critical abnormalities.  Chest x-ray reviewed by me is unremarkable for acute pathology.  COVID-19, influenza, RSV testing all independently reviewed interpreted by me is positive for RSV.  Patient was given breathing treatment, supplemental O2 and Solu-Medrol and still wheezing.  Patient case has been discussed with the hospitalist team who will admit to the hospital for further evaluation and continuation of treatment.             The patient presents with respiratory distress.  The patient does require supplemental oxygen.  Patient was placed on the cardiac monitor after given supplemental O2, DuoNeb, Solu-Medrol.  They were monitored for ventricular ectopy, arrhythmia, tachycardia, hypoxia, and changes in blood pressure.  Continuous pulse oximetry was placed in waveform with corresponding oxygen saturation was assessed  throughout their stay in the emergency department.  Patient was rechecked several times in the ED for decline in mental status and worsening distress.    Total Critical Care time of 35 minutes. Total critical care time documented does not include time spent on separately billed procedures for services of nurses or physician assistants. I personally saw and examined the patient. I have reviewed all diagnostic interpretations and treatment plans as written. I was present for the key portions of any procedures performed and the inclusive time noted in any critical care statement. Critical care time includes patient management by me, time spent at the patients bedside,  time to review lab and imaging results, discussing patient care, documentation in the medical record, and time spent with family or caregiver.          Patient Care Considerations:    CONSULT: I considered consulting pulmonology, however no emergent indication      Consultants/Shared Management Plan:    Hospitalist: I have discussed the case with Jonas who agrees to accept the patient for admission.    Social Determinants of Health:    Patient is independent, reliable, and has access to care.       Disposition and Care Coordination:    Admit:   Through independent evaluation of the patient's history, physical, and imperical data, the patient meets criteria for inpatient admission to the hospital.        Final diagnoses:   Acute respiratory failure with hypoxia   RSV (respiratory syncytial virus infection)        ED Disposition       ED Disposition   Decision to Admit    Condition   --    Comment   --               This medical record created using voice recognition software.             Garcia Crews MD  01/02/25 [id number]

## 2025-01-03 LAB
ANION GAP SERPL CALCULATED.3IONS-SCNC: 11.8 MMOL/L (ref 5–15)
AV MEAN PRESS GRAD SYS DOP V1V2: 11 MMHG
AV VMAX SYS DOP: 218 CM/SEC
BASOPHILS # BLD AUTO: 0.01 10*3/MM3 (ref 0–0.2)
BASOPHILS NFR BLD AUTO: 0.2 % (ref 0–1.5)
BH CV ECHO MEAS - AO MAX PG: 19 MMHG
BH CV ECHO MEAS - AO ROOT DIAM: 2.6 CM
BH CV ECHO MEAS - AO V2 VTI: 48.1 CM
BH CV ECHO MEAS - AVA(I,D): 1.85 CM2
BH CV ECHO MEAS - EDV(CUBED): 91.1 ML
BH CV ECHO MEAS - EDV(MOD-SP2): 64.1 ML
BH CV ECHO MEAS - EDV(MOD-SP4): 85.9 ML
BH CV ECHO MEAS - EF(MOD-SP2): 67.2 %
BH CV ECHO MEAS - EF(MOD-SP4): 62.2 %
BH CV ECHO MEAS - ESV(CUBED): 24.4 ML
BH CV ECHO MEAS - ESV(MOD-SP2): 21 ML
BH CV ECHO MEAS - ESV(MOD-SP4): 32.5 ML
BH CV ECHO MEAS - FS: 35.6 %
BH CV ECHO MEAS - IVS/LVPW: 1 CM
BH CV ECHO MEAS - IVSD: 1.2 CM
BH CV ECHO MEAS - LA DIMENSION: 3.5 CM
BH CV ECHO MEAS - LAT PEAK E' VEL: 8.6 CM/SEC
BH CV ECHO MEAS - LV MASS(C)D: 198.1 GRAMS
BH CV ECHO MEAS - LV MAX PG: 6 MMHG
BH CV ECHO MEAS - LV MEAN PG: 3 MMHG
BH CV ECHO MEAS - LV V1 MAX: 122 CM/SEC
BH CV ECHO MEAS - LV V1 VTI: 28.3 CM
BH CV ECHO MEAS - LVIDD: 4.5 CM
BH CV ECHO MEAS - LVIDS: 2.9 CM
BH CV ECHO MEAS - LVOT AREA: 3.1 CM2
BH CV ECHO MEAS - LVOT DIAM: 2 CM
BH CV ECHO MEAS - LVPWD: 1.2 CM
BH CV ECHO MEAS - MED PEAK E' VEL: 5.3 CM/SEC
BH CV ECHO MEAS - MV A MAX VEL: 84.8 CM/SEC
BH CV ECHO MEAS - MV DEC SLOPE: 685 CM/SEC2
BH CV ECHO MEAS - MV DEC TIME: 0.16 SEC
BH CV ECHO MEAS - MV E MAX VEL: 109 CM/SEC
BH CV ECHO MEAS - MV E/A: 1.29
BH CV ECHO MEAS - MV MEAN PG: 3 MMHG
BH CV ECHO MEAS - MV V2 VTI: 26.9 CM
BH CV ECHO MEAS - MVA(VTI): 3.3 CM2
BH CV ECHO MEAS - RVDD: 2.2 CM
BH CV ECHO MEAS - SV(LVOT): 88.9 ML
BH CV ECHO MEAS - SV(MOD-SP2): 43.1 ML
BH CV ECHO MEAS - SV(MOD-SP4): 53.4 ML
BH CV ECHO MEASUREMENTS AVERAGE E/E' RATIO: 15.68
BUN SERPL-MCNC: 17 MG/DL (ref 8–23)
BUN/CREAT SERPL: 15.3 (ref 7–25)
CALCIUM SPEC-SCNC: 8.9 MG/DL (ref 8.6–10.5)
CHLORIDE SERPL-SCNC: 105 MMOL/L (ref 98–107)
CO2 SERPL-SCNC: 21.2 MMOL/L (ref 22–29)
CREAT SERPL-MCNC: 1.11 MG/DL (ref 0.76–1.27)
DEPRECATED RDW RBC AUTO: 46.9 FL (ref 37–54)
EGFRCR SERPLBLD CKD-EPI 2021: 74.2 ML/MIN/1.73
EOSINOPHIL # BLD AUTO: 0 10*3/MM3 (ref 0–0.4)
EOSINOPHIL NFR BLD AUTO: 0 % (ref 0.3–6.2)
ERYTHROCYTE [DISTWIDTH] IN BLOOD BY AUTOMATED COUNT: 14.2 % (ref 12.3–15.4)
GLUCOSE SERPL-MCNC: 146 MG/DL (ref 65–99)
HCT VFR BLD AUTO: 47.7 % (ref 37.5–51)
HGB BLD-MCNC: 15.5 G/DL (ref 13–17.7)
IMM GRANULOCYTES # BLD AUTO: 0.01 10*3/MM3 (ref 0–0.05)
IMM GRANULOCYTES NFR BLD AUTO: 0.2 % (ref 0–0.5)
LEFT ATRIUM VOLUME INDEX: 23 ML/M2
LV EF BIPLANE MOD: 64.6 %
LYMPHOCYTES # BLD AUTO: 0.38 10*3/MM3 (ref 0.7–3.1)
LYMPHOCYTES NFR BLD AUTO: 7.7 % (ref 19.6–45.3)
MCH RBC QN AUTO: 29.4 PG (ref 26.6–33)
MCHC RBC AUTO-ENTMCNC: 32.5 G/DL (ref 31.5–35.7)
MCV RBC AUTO: 90.3 FL (ref 79–97)
MONOCYTES # BLD AUTO: 0.07 10*3/MM3 (ref 0.1–0.9)
MONOCYTES NFR BLD AUTO: 1.4 % (ref 5–12)
NEUTROPHILS NFR BLD AUTO: 4.46 10*3/MM3 (ref 1.7–7)
NEUTROPHILS NFR BLD AUTO: 90.5 % (ref 42.7–76)
NRBC BLD AUTO-RTO: 0 /100 WBC (ref 0–0.2)
PLATELET # BLD AUTO: 186 10*3/MM3 (ref 140–450)
PMV BLD AUTO: 11.3 FL (ref 6–12)
POTASSIUM SERPL-SCNC: 4.5 MMOL/L (ref 3.5–5.2)
PROCALCITONIN SERPL-MCNC: 0.13 NG/ML (ref 0–0.25)
RBC # BLD AUTO: 5.28 10*6/MM3 (ref 4.14–5.8)
SODIUM SERPL-SCNC: 138 MMOL/L (ref 136–145)
WBC NRBC COR # BLD AUTO: 4.93 10*3/MM3 (ref 3.4–10.8)

## 2025-01-03 PROCEDURE — 94799 UNLISTED PULMONARY SVC/PX: CPT

## 2025-01-03 PROCEDURE — 25010000002 METHYLPREDNISOLONE PER 40 MG: Performed by: STUDENT IN AN ORGANIZED HEALTH CARE EDUCATION/TRAINING PROGRAM

## 2025-01-03 PROCEDURE — 84145 PROCALCITONIN (PCT): CPT | Performed by: INTERNAL MEDICINE

## 2025-01-03 PROCEDURE — 80048 BASIC METABOLIC PNL TOTAL CA: CPT | Performed by: STUDENT IN AN ORGANIZED HEALTH CARE EDUCATION/TRAINING PROGRAM

## 2025-01-03 PROCEDURE — 25010000002 FUROSEMIDE PER 20 MG: Performed by: STUDENT IN AN ORGANIZED HEALTH CARE EDUCATION/TRAINING PROGRAM

## 2025-01-03 PROCEDURE — 94760 N-INVAS EAR/PLS OXIMETRY 1: CPT

## 2025-01-03 PROCEDURE — 25010000002 PIPERACILLIN SOD-TAZOBACTAM PER 1 G: Performed by: STUDENT IN AN ORGANIZED HEALTH CARE EDUCATION/TRAINING PROGRAM

## 2025-01-03 PROCEDURE — 85025 COMPLETE CBC W/AUTO DIFF WBC: CPT | Performed by: STUDENT IN AN ORGANIZED HEALTH CARE EDUCATION/TRAINING PROGRAM

## 2025-01-03 PROCEDURE — 99233 SBSQ HOSP IP/OBS HIGH 50: CPT | Performed by: INTERNAL MEDICINE

## 2025-01-03 RX ORDER — PREGABALIN 100 MG/1
100 CAPSULE ORAL 2 TIMES DAILY
Status: DISCONTINUED | OUTPATIENT
Start: 2025-01-03 | End: 2025-01-09 | Stop reason: HOSPADM

## 2025-01-03 RX ORDER — TELMISARTAN 80 MG/1
80 TABLET ORAL DAILY
COMMUNITY

## 2025-01-03 RX ORDER — METOPROLOL SUCCINATE 100 MG/1
0.5 TABLET, EXTENDED RELEASE ORAL DAILY
COMMUNITY

## 2025-01-03 RX ORDER — ALBUTEROL SULFATE 0.83 MG/ML
2.5 SOLUTION RESPIRATORY (INHALATION)
Status: DISCONTINUED | OUTPATIENT
Start: 2025-01-03 | End: 2025-01-09 | Stop reason: HOSPADM

## 2025-01-03 RX ORDER — NICOTINE 21 MG/24HR
1 PATCH, TRANSDERMAL 24 HOURS TRANSDERMAL DAILY PRN
Status: DISCONTINUED | OUTPATIENT
Start: 2025-01-03 | End: 2025-01-09 | Stop reason: HOSPADM

## 2025-01-03 RX ORDER — CELECOXIB 200 MG/1
200 CAPSULE ORAL DAILY
COMMUNITY

## 2025-01-03 RX ORDER — ALUMINA, MAGNESIA, AND SIMETHICONE 2400; 2400; 240 MG/30ML; MG/30ML; MG/30ML
15 SUSPENSION ORAL EVERY 6 HOURS PRN
Status: DISCONTINUED | OUTPATIENT
Start: 2025-01-03 | End: 2025-01-09 | Stop reason: HOSPADM

## 2025-01-03 RX ORDER — PROCHLORPERAZINE EDISYLATE 5 MG/ML
5 INJECTION INTRAMUSCULAR; INTRAVENOUS EVERY 6 HOURS PRN
Status: DISCONTINUED | OUTPATIENT
Start: 2025-01-03 | End: 2025-01-09 | Stop reason: HOSPADM

## 2025-01-03 RX ORDER — HYDROCODONE BITARTRATE AND ACETAMINOPHEN 7.5; 325 MG/1; MG/1
1 TABLET ORAL EVERY 6 HOURS PRN
COMMUNITY
Start: 2024-10-29

## 2025-01-03 RX ORDER — FAMOTIDINE 20 MG/1
20 TABLET, FILM COATED ORAL
Status: DISCONTINUED | OUTPATIENT
Start: 2025-01-03 | End: 2025-01-09 | Stop reason: HOSPADM

## 2025-01-03 RX ORDER — CELECOXIB 100 MG/1
200 CAPSULE ORAL DAILY
Status: DISCONTINUED | OUTPATIENT
Start: 2025-01-03 | End: 2025-01-08

## 2025-01-03 RX ORDER — PREDNISONE 20 MG/1
40 TABLET ORAL
Status: COMPLETED | OUTPATIENT
Start: 2025-01-04 | End: 2025-01-08

## 2025-01-03 RX ORDER — ASPIRIN 81 MG/1
81 TABLET, CHEWABLE ORAL DAILY
Status: DISCONTINUED | OUTPATIENT
Start: 2025-01-03 | End: 2025-01-09 | Stop reason: HOSPADM

## 2025-01-03 RX ORDER — ARFORMOTEROL TARTRATE 15 UG/2ML
15 SOLUTION RESPIRATORY (INHALATION)
Status: DISCONTINUED | OUTPATIENT
Start: 2025-01-03 | End: 2025-01-09 | Stop reason: HOSPADM

## 2025-01-03 RX ORDER — HYDROXYZINE HYDROCHLORIDE 25 MG/1
25 TABLET, FILM COATED ORAL 3 TIMES DAILY PRN
Status: DISCONTINUED | OUTPATIENT
Start: 2025-01-03 | End: 2025-01-09 | Stop reason: HOSPADM

## 2025-01-03 RX ORDER — HYDROCODONE BITARTRATE AND ACETAMINOPHEN 7.5; 325 MG/1; MG/1
1 TABLET ORAL EVERY 6 HOURS PRN
Status: DISCONTINUED | OUTPATIENT
Start: 2025-01-03 | End: 2025-01-09 | Stop reason: HOSPADM

## 2025-01-03 RX ORDER — AMOXICILLIN 250 MG
1 CAPSULE ORAL 2 TIMES DAILY
Status: DISCONTINUED | OUTPATIENT
Start: 2025-01-03 | End: 2025-01-09 | Stop reason: HOSPADM

## 2025-01-03 RX ORDER — METOPROLOL SUCCINATE 50 MG/1
50 TABLET, EXTENDED RELEASE ORAL DAILY
Status: DISCONTINUED | OUTPATIENT
Start: 2025-01-03 | End: 2025-01-09 | Stop reason: HOSPADM

## 2025-01-03 RX ORDER — ACETAMINOPHEN 325 MG/1
650 TABLET ORAL EVERY 6 HOURS PRN
Status: DISCONTINUED | OUTPATIENT
Start: 2025-01-03 | End: 2025-01-09 | Stop reason: HOSPADM

## 2025-01-03 RX ORDER — BUDESONIDE 0.5 MG/2ML
0.5 INHALANT ORAL
Status: DISCONTINUED | OUTPATIENT
Start: 2025-01-03 | End: 2025-01-09 | Stop reason: HOSPADM

## 2025-01-03 RX ORDER — ONDANSETRON 2 MG/ML
4 INJECTION INTRAMUSCULAR; INTRAVENOUS EVERY 4 HOURS PRN
Status: DISCONTINUED | OUTPATIENT
Start: 2025-01-03 | End: 2025-01-09 | Stop reason: HOSPADM

## 2025-01-03 RX ORDER — ALBUTEROL SULFATE 0.83 MG/ML
2.5 SOLUTION RESPIRATORY (INHALATION) EVERY 4 HOURS PRN
Status: DISCONTINUED | OUTPATIENT
Start: 2025-01-03 | End: 2025-01-09 | Stop reason: HOSPADM

## 2025-01-03 RX ORDER — LOSARTAN POTASSIUM 50 MG/1
100 TABLET ORAL
Status: DISCONTINUED | OUTPATIENT
Start: 2025-01-03 | End: 2025-01-07

## 2025-01-03 RX ORDER — LIDOCAINE 4 G/G
1 PATCH TOPICAL DAILY PRN
Status: DISCONTINUED | OUTPATIENT
Start: 2025-01-03 | End: 2025-01-09 | Stop reason: HOSPADM

## 2025-01-03 RX ADMIN — ALBUTEROL SULFATE 2.5 MG: 2.5 SOLUTION RESPIRATORY (INHALATION) at 23:58

## 2025-01-03 RX ADMIN — IPRATROPIUM BROMIDE AND ALBUTEROL SULFATE 3 ML: 2.5; .5 SOLUTION RESPIRATORY (INHALATION) at 06:43

## 2025-01-03 RX ADMIN — HYDROCODONE BITARTRATE AND ACETAMINOPHEN 1 TABLET: 7.5; 325 TABLET ORAL at 20:22

## 2025-01-03 RX ADMIN — FAMOTIDINE 20 MG: 20 TABLET, FILM COATED ORAL at 18:32

## 2025-01-03 RX ADMIN — BUDESONIDE 0.5 MG: 0.5 INHALANT RESPIRATORY (INHALATION) at 19:37

## 2025-01-03 RX ADMIN — IPRATROPIUM BROMIDE AND ALBUTEROL SULFATE 3 ML: 2.5; .5 SOLUTION RESPIRATORY (INHALATION) at 16:36

## 2025-01-03 RX ADMIN — METOPROLOL SUCCINATE 50 MG: 50 TABLET, EXTENDED RELEASE ORAL at 20:23

## 2025-01-03 RX ADMIN — ALBUTEROL SULFATE 2.5 MG: 2.5 SOLUTION RESPIRATORY (INHALATION) at 19:38

## 2025-01-03 RX ADMIN — METHYLPREDNISOLONE SODIUM SUCCINATE 40 MG: 40 INJECTION, POWDER, FOR SOLUTION INTRAMUSCULAR; INTRAVENOUS at 13:42

## 2025-01-03 RX ADMIN — Medication 10 ML: at 20:26

## 2025-01-03 RX ADMIN — GUAIFENESIN 600 MG: 600 TABLET ORAL at 20:22

## 2025-01-03 RX ADMIN — DOXYCYCLINE 100 MG: 100 CAPSULE ORAL at 08:28

## 2025-01-03 RX ADMIN — GUAIFENESIN 600 MG: 600 TABLET ORAL at 08:28

## 2025-01-03 RX ADMIN — PREGABALIN 100 MG: 100 CAPSULE ORAL at 20:23

## 2025-01-03 RX ADMIN — ARFORMOTEROL TARTRATE 15 MCG: 15 SOLUTION RESPIRATORY (INHALATION) at 19:37

## 2025-01-03 RX ADMIN — DOXYCYCLINE 100 MG: 100 CAPSULE ORAL at 20:22

## 2025-01-03 RX ADMIN — IPRATROPIUM BROMIDE AND ALBUTEROL SULFATE 3 ML: 2.5; .5 SOLUTION RESPIRATORY (INHALATION) at 11:59

## 2025-01-03 RX ADMIN — ASPIRIN 81 MG: 81 TABLET, CHEWABLE ORAL at 20:22

## 2025-01-03 RX ADMIN — METOPROLOL TARTRATE 100 MG: 50 TABLET, FILM COATED ORAL at 09:18

## 2025-01-03 RX ADMIN — FAMOTIDINE 20 MG: 20 TABLET, FILM COATED ORAL at 08:27

## 2025-01-03 RX ADMIN — HYDROCODONE BITARTRATE AND ACETAMINOPHEN 1 TABLET: 7.5; 325 TABLET ORAL at 05:53

## 2025-01-03 RX ADMIN — HYDROCODONE BITARTRATE AND ACETAMINOPHEN 1 TABLET: 7.5; 325 TABLET ORAL at 13:42

## 2025-01-03 RX ADMIN — Medication 10 ML: at 08:29

## 2025-01-03 RX ADMIN — METHYLPREDNISOLONE SODIUM SUCCINATE 40 MG: 40 INJECTION, POWDER, FOR SOLUTION INTRAMUSCULAR; INTRAVENOUS at 05:53

## 2025-01-03 RX ADMIN — PIPERACILLIN AND TAZOBACTAM 4.5 G: 4; .5 INJECTION, POWDER, FOR SOLUTION INTRAVENOUS; PARENTERAL at 05:53

## 2025-01-03 RX ADMIN — FUROSEMIDE 40 MG: 10 INJECTION, SOLUTION INTRAMUSCULAR; INTRAVENOUS at 18:32

## 2025-01-03 RX ADMIN — Medication 10 ML: at 05:54

## 2025-01-03 RX ADMIN — FUROSEMIDE 40 MG: 10 INJECTION, SOLUTION INTRAMUSCULAR; INTRAVENOUS at 05:55

## 2025-01-03 NOTE — PLAN OF CARE
Goal Outcome Evaluation:              Outcome Evaluation: Flora arrived to floor from ED shortly after midning. A/o answers all admissions questions appropriately. Had been refusing Lasix, but agreed to am dose after further education. Patient is tachypneic breathing 28-38/min, very short of breath with minimal activity. Patient requiring 3LPM NC- does not normally wear o2. Norco given once this shift. BP elevated 164/98, PRN hydralazine not given at this time- lasix given.

## 2025-01-03 NOTE — CASE MANAGEMENT/SOCIAL WORK
Discharge Planning Assessment   [place]     Patient Name: Radames Quinteros  MRN: [id number]  Today's Date: 1/3/2025    Admit Date: 1/2/2025    Plan: Pt lives with wife. SW spoke with wife Lorenza. P[facility]: DIPESH [facility] [facility]. Pt has good support from family. Pt is usually independent at home. No concerns with affording medications, groceries, utilities or issues with transportation. Wife is hoping Pt will be able to return home at discharge. Pt uses a cane and [facility], Pt does not use CPAP at this time. SW will continue to follow for discharge needs.   Discharge Needs Assessment       Row Name 01/03/25 1144       Living Environment    People in Home spouse    Current Living Arrangements home    Potentially Unsafe Housing Conditions none    In the past 12 months has the electric, gas, oil, or water company threatened to shut off services in your home? No    Primary Care Provided by self    Provides Primary Care For no one    Family Caregiver if Needed spouse    Quality of Family Relationships helpful;involved;supportive    Able to Return to Prior Arrangements yes       Resource/Environmental Concerns    Resource/Environmental Concerns none    Transportation Concerns none       Transportation Needs    In the past 12 months, has lack of transportation kept you from medical appointments or from getting medications? no    In the past 12 months, has lack of transportation kept you from meetings, work, or from getting things needed for daily living? No       Food Insecurity    Within the past 12 months, you worried that your food would run out before you got the money to buy more. Never true    Within the past 12 months, the food you bought just didn't last and you didn't have money to get more. Never true       Transition Planning    Patient/Family Anticipates Transition to home with family    Patient/Family Anticipated Services at Transition none    Transportation Anticipated family or friend will  provide       Discharge Needs Assessment    Equipment Currently Used at Home cpap;cane, straight;nebulizer    Concerns to be Addressed discharge planning    Do you want help finding or keeping work or a job? I do not need or want help    Do you want help with school or training? For example, starting or completing job training or getting a high school diploma, GED or equivalent No    Anticipated Changes Related to Illness none    Equipment Needed After Discharge none    Discharge Coordination/Progress Pt lives with wife. SW spoke with wife Lorenza. PCP: DIPESH Jasso Pharm: [employer] or [facility]. Pt has good support from family. Pt is usually independent at home. No concerns with affording medications, groceries, utilities or issues with transportation.  Wife is hoping Pt will be able to return home at discharge. Pt uses a cane [facility], Pt does not use CPAP at this time. SW will continue to follow for discharge needs.                   Discharge Plan       Row Name 01/03/25 1157       Plan    Plan Pt lives with wife. SW spoke with wife Lorenza. PCP: DIPESH Jasso Pharm: [employer] or [facility]. Pt has good support from family. Pt is usually independent at home. No concerns with affording medications, groceries, utilities or issues with transportation. Wife is hoping Pt will be able to return home at discharge. Pt uses a cane and [facility], Pt does not use CPAP at this time. SW will continue to follow for discharge needs.                  Continued Care and Services - Admitted Since 1/2/2025    No active coordination exists for this encounter.          Demographic Summary       Row Name 01/03/25 1143       General Information    Admission Type inpatient    Arrived From emergency department    Referral Source admission list    Reason for Consult discharge planning    Preferred Language English       Contact Information    Permission Granted to Share Info With permission denied                   Functional Status        Row Name 01/03/25 1142       Functional Status    Usual Activity Tolerance good    Current Activity Tolerance good       Physical Activity    On average, how many days per week do you engage in moderate to strenuous exercise (like a brisk walk)? 0 days    On average, how many minutes do you engage in exercise at this level? 0 min    Number of minutes of exercise per week 0       Assessment of Health Literacy    How often do you have someone help you read hospital materials? Never    How often do you have problems learning about your medical condition because of difficulty understanding written information? Never    How often do you have a problem understanding what is told to you about your medical condition? Never    How confident are you filling out medical forms by yourself? Quite a bit    Health Literacy Good       Functional Status, IADL    Medications independent    Meal Preparation independent    Housekeeping independent    Laundry independent    Shopping independent    If for any reason you need help with day-to-day activities such as bathing, preparing meals, shopping, managing finances, etc., do you get the help you need? I don't need any help       Mental Status    General Appearance WDL WDL       Mental Status Summary    Recent Changes in Mental Status/Cognitive Functioning no changes       Employment/    Employment Status disabled                   Psychosocial    No documentation.                  Abuse/Neglect    No documentation.                  Legal       Row Name 01/03/25 1144       Financial Resource Strain    How hard is it for you to pay for the very basics like food, housing, medical care, and heating? Not hard       Financial/Legal    Source of Income disability;salary/wages    Financial/Environmental Concerns none    Application for Public Assistance not applied       Legal    Criminal Activity/Legal Involvement none                   Substance Abuse    No documentation.                   Patient Forms    No documentation.                     Evette Mac

## 2025-01-03 NOTE — PROGRESS NOTES
[facility]   Hospitalist Progress Note    Date of admission: 1/2/2025  Patient Name: Radames Quinteros  1960  Date: 1/3/2025      Subjective     Chief Complaint   Patient presents with    Shortness of Breath       Interval Followup: Still with frequent cough dry hacking, fevers.  Is coughing a lot and then felt some pain in his abdomen describes it seems to be a new mild hernia.  Had a bowel movement today, denies any severe pain at that site does ache a little bit.  He has SHOBHA does not use NIPPV states he just sleeps on his side and does better with that and does not want to use anything.  Is on room air baseline.      Objective     Vitals:   Temp:  [97.3 °F (36.3 °C)-98.6 °F (37 °C)] 98.1 °F (36.7 °C)  Heart Rate:  [] 79  Resp:  [22-38] 24  BP: (145-178)/(84-98) 151/93  Flow (L/min) (Oxygen Therapy):  [2-3] 3    Physical Exam  Awake conversant in bed morbidly obese  Dyspneic with conversation, bilateral prolonged inspiratory and expiratory wheeze with only mild to moderate aeration, on nasal cannula frequent dry hacking cough  RRR trace lower extremity edema  Obese abdomen full, has mild left upper herniation reducible, positive bowel sounds    Result Review:  Vital signs, labs and recent relevant imaging reviewed.      CBC          6/22/2024    04:13 1/2/2025    18:23 1/3/2025    04:10   CBC   WBC 8.94     6.69  4.93    RBC 4.14     5.41  5.28    Hemoglobin 11.8     15.7  15.5    Hematocrit 36.2     48.6  47.7    MCV 87.4     89.8  90.3    MCH 28.5     29.0  29.4    MCHC 32.6     32.3  32.5    RDW 12.9     14.2  14.2    Platelets 331     191  186       Details          This result is from an external source.             CMP          6/21/2024    05:52 1/2/2025    18:23 1/3/2025    04:10   CMP   Glucose  126  146    BUN  18  17    Creatinine  1.24  1.11    EGFR  64.9  74.2    Sodium  136  138    Potassium 3.3     4.9  4.5    Chloride  102  105    Calcium  9.0  8.9    Total Protein  7.7      Albumin  4.0     Globulin  3.7     Total Bilirubin  0.8     Alkaline Phosphatase  94     AST (SGOT)  30     ALT (SGPT)  21     Albumin/Globulin Ratio  1.1     BUN/Creatinine Ratio  14.5  15.3    Anion Gap  14.3  11.8       Details          This result is from an external source.                 acetaminophen    albuterol    aluminum-magnesium hydroxide-simethicone    senna-docusate sodium **AND** polyethylene glycol **AND** bisacodyl **AND** bisacodyl    Diclofenac Sodium    HYDROcodone-acetaminophen    hydrOXYzine    influenza vaccine    Lidocaine    melatonin    nicotine    nitroglycerin    ondansetron    prochlorperazine    sodium chloride    sodium chloride    sodium chloride    [Held by provider] amLODIPine, 5 mg, Oral, Q24H  doxycycline, 100 mg, Oral, Q12H  famotidine, 20 mg, Oral, BID AC  furosemide, 40 mg, Intravenous, Q12H  guaiFENesin, 600 mg, Oral, Q12H  ipratropium-albuterol, 3 mL, Nebulization, 4x Daily - RT  methylPREDNISolone sodium succinate, 40 mg, Intravenous, Q8H  metoprolol tartrate, 100 mg, Oral, BID  sodium chloride, 10 mL, Intravenous, Q12H        CT Angiogram Chest Pulmonary Embolism    Result Date: 1/2/2025  No central (or proximal) pulmonary embolism. Otherwise, the study is not diagnostic for evaluation of more distal pulmonary emboli due to limitations of the contrast bolus. No thoracic aortic aneurysm or dissection. No acute infiltrate. Please see above comments for further detail.   Portions of this note were completed with a voice recognition program.  Electronically Signed By-Radames Riggs MD On:1/2/2025 11:56 PM      XR Chest 1 View    Result Date: 1/2/2025  Impression: No acute process. Electronically Signed: Aakash Rogers MD  1/2/2025 8:02 PM EST  Workstation ID: [id number]     Assessment / Plan     Summary: 64 y.o. with obesity, SHOBHA not tolerant of NIPPV, Hypertension who presented with 5 to 6 days of worsening shortness of air found to have RSV and suspected diastolic CHF  exacerbation    Assessment/Plan (clinically significant if listed here)  Acute hypoxemic respiratory failure  RSV URI with bronchitis and wheezing  Diastolic CHF exacerbation - EF 55% per 1/2025  Probable COPD w/ exacerbation  Hypertension  SHOBHA not compliant/does not use home NIPPV  Morbid obesity BMI 42.4  Former smoker quit in 2022, 44-pack-year hx    Echo EF 55%, diastolic dysfunction,  Cxr non acute infiltrate on personal review    Continue IV Lasix monitor I's and O's renal function, potassium, 2 g sodium diet  Outpatient is on 2 mg Bumex prn only   Placed on probiotic, Pulmicort scheduled nebs, steroids, needs pulm follow-up and PFTs unclear if copd (suspected previously, needs pfts).  No acute infiltrate on CT PE, dc zosyn and continue Doxy 5-day course  Wean O2 as able  Guaifenasin  Cont metoprolol, hold losartan monitor bp  Cont home lyrica  Bowel regimen, has hernia from coughing, mild reducible, monitor, can use abdominal binder if desired. Had BM today   Cont famotidine  Cont aspirin  PT/OT  Check a.m. CBC, BMP, magnesium, phosphorus  Continue hospitalization at current level of care, tele, will be several days before stable for dc likely    Dispo: wants to go home once medically stable.   D/w SW    VTE Prophylaxis:  Mechanical VTE prophylaxis orders are present.      Code Status (Patient has no pulse and is not breathing): CPR (Attempt to Resuscitate)  Medical Interventions (Patient has pulse or is breathing): Full Support

## 2025-01-03 NOTE — H&P
[facility]  Date: [facility]   Patient Name: Radames Quinteros  : 1960  MRN: [id number]  Primary Care Physician:  Vishal Jasso MD  Date of admission: 2025    Subjective   Subjective     Chief Complaint: Above    HPI:    Radames Quinteros is a 64 y.o. male with past medical history of hypertension, CHF presents to the ED due to shortness of breath.  Patient states feeling short of breath for the past 5 to 6 days is gradually worsening.  Denies fevers, chills, chest pain, abdominal pain, nausea, vomiting or diarrhea.  In the ED, patient's vitals show temperature 97.9, heart rate 76, blood pressure 145/93 and satting 90% on 2 L NC.  Labs show troponin 23, sodium 136, creatinine 1.24, lactic 2.4, white blood cell 6.6.  RSV positive.  Patient was started on DuoNebs and Solu-Medrol.  Patient admitted to floors for further management.    Personal History     Past Medical History:  Past Medical History:   Diagnosis Date   • Congestive heart failure    • Hypertension    • Injury of back    • Sleep apnea        Past Surgical History:  Past Surgical History:   Procedure Laterality Date   • BACK SURGERY     • CARDIAC CATHETERIZATION     • CARPAL TUNNEL RELEASE     • COLONOSCOPY N/A 2024    Procedure: COLONOSCOPY with hot snare polypectomy with endo clip x1;  Surgeon: Cali Moulton MD;  Location: [facility] ENDOSCOPY;  Service: General;  Laterality: N/A;  colon polyps   • ORIF FOREARM FRACTURE     • PATELLA FRACTURE SURGERY     • REPLACEMENT TOTAL KNEE         Family History:   History reviewed. No pertinent family history.    Social History:   Social History     Socioeconomic History   • Marital status:    Tobacco Use   • Smoking status: Former     Current packs/day: 0.00     Average packs/day: 0.5 packs/day for 43.8 years (21.9 ttl pk-yrs)     Types: Cigarettes     Start date: 1978     Quit date: 2022     Years since quittin.7   • Smokeless  tobacco: Never   Vaping Use   • Vaping status: Never Used   Substance and Sexual Activity   • Alcohol use: Not Currently     Comment: social   • Drug use: Never   • Sexual activity: Not Currently     Partners: Female       Home Medications:  Metoprolol Succinate, acetaminophen, albuterol, amLODIPine, aspirin, bumetanide, cetirizine, clobetasol, folic acid, metoprolol tartrate, mupirocin, oxyCODONE-acetaminophen, predniSONE, pregabalin, and simethicone    Allergies:  Allergies   Allergen Reactions   • Ropinirole Anaphylaxis and Rash     rash   • Silicone Anaphylaxis and Rash     rash   • Varenicline Other (See Comments)     per patient report   • Adhesive Tape Rash   • Amitriptyline Dermatitis and Rash     rash   • Dilantin [Phenytoin] Rash   • Latex Rash   • Tape Rash       Review of Systems   All systems were reviewed and negative except for: Above    Objective   Objective     Vitals:   Temp:  [97.9 °F (36.6 °C)] 97.9 °F (36.6 °C)  Heart Rate:  [73-87] 76  Resp:  [22-32] 22  BP: (145-147)/() 145/93  Flow (L/min) (Oxygen Therapy):  [2] 2    Physical Exam    Constitutional: Awake, alert, no acute distress   Eyes: Pupils equal, sclerae anicteric, no conjunctival injection   HENT: NCAT, mucous membranes moist   Neck: Supple, no thyromegaly, no lymphadenopathy, trachea midline   Respiratory: Wheezing and crackles bilaterally   Cardiovascular: RRR, no murmurs, rubs, or gallops, palpable pedal pulses bilaterally   Gastrointestinal: Positive bowel sounds, soft, nontender, nondistended   Musculoskeletal: No bilateral ankle edema, no clubbing or cyanosis to extremities   Psychiatric: Appropriate affect, cooperative   Neurologic: Oriented x 3, strength symmetric in all extremities, Cranial Nerves grossly intact to confrontation, speech clear   Skin: +2 lower extremity pitting    Result Review    Result Review:  I have personally reviewed the results from the time of this admission to 1/2/2025 21:34 EST and agree with  these findings:  [x]  Laboratory  []  Microbiology  [x]  Radiology  []  EKG/Telemetry   []  Cardiology/Vascular   []  Pathology  [x]  Old records  []  Other:      Assessment & Plan   Assessment / Plan     Assessment/Plan:     Assessment  Acute hypoxemic respiratory failure  RSV positive  CHF; EF unknown  Hypertension  Obstructive sleep apnea    Plan  Admit to [facility]  Telemetry  Monitor vital  CBC BMP  Chest x-ray reviewed  CT chest ordered  Echo ordered  Start home meds  DuoNeb  IV Solu-Medrol 40 3 times daily  Lasix 40 IV twice daily      VTE Prophylaxis:  Mechanical VTE prophylaxis orders are signed & held.          CODE STATUS:    Code Status (Patient has no pulse and is not breathing): CPR (Attempt to Resuscitate)  Medical Interventions (Patient has pulse or is breathing): Full Support      Admission Status:  I believe this patient meets inpt status.    Electronically signed by Dar Mcdaniel MD, 01/02/25, 9:34 PM EST.

## 2025-01-03 NOTE — PLAN OF CARE
Goal Outcome Evaluation:   No events this shift. Very talkative.no shortness of breath during conversations.  Continue to wear O2. Setting at side of bed, ambulating in room, to bathroom, BM  x2 today. VSS will continue to monitor summer rn

## 2025-01-04 ENCOUNTER — APPOINTMENT (OUTPATIENT)
Dept: GENERAL RADIOLOGY | Facility: HOSPITAL | Age: 65
End: 2025-01-04
Payer: OTHER GOVERNMENT

## 2025-01-04 LAB
ANION GAP SERPL CALCULATED.3IONS-SCNC: 12.1 MMOL/L (ref 5–15)
BASOPHILS # BLD AUTO: 0.02 10*3/MM3 (ref 0–0.2)
BASOPHILS NFR BLD AUTO: 0.2 % (ref 0–1.5)
BUN SERPL-MCNC: 34 MG/DL (ref 8–23)
BUN/CREAT SERPL: 24.8 (ref 7–25)
CALCIUM SPEC-SCNC: 8.8 MG/DL (ref 8.6–10.5)
CHLORIDE SERPL-SCNC: 104 MMOL/L (ref 98–107)
CO2 SERPL-SCNC: 22.9 MMOL/L (ref 22–29)
CREAT SERPL-MCNC: 1.37 MG/DL (ref 0.76–1.27)
DEPRECATED RDW RBC AUTO: 47.4 FL (ref 37–54)
EGFRCR SERPLBLD CKD-EPI 2021: 57.6 ML/MIN/1.73
EOSINOPHIL # BLD AUTO: 0 10*3/MM3 (ref 0–0.4)
EOSINOPHIL NFR BLD AUTO: 0 % (ref 0.3–6.2)
ERYTHROCYTE [DISTWIDTH] IN BLOOD BY AUTOMATED COUNT: 14 % (ref 12.3–15.4)
GLUCOSE SERPL-MCNC: 139 MG/DL (ref 65–99)
HCT VFR BLD AUTO: 47.2 % (ref 37.5–51)
HGB BLD-MCNC: 15.1 G/DL (ref 13–17.7)
IMM GRANULOCYTES # BLD AUTO: 0.03 10*3/MM3 (ref 0–0.05)
IMM GRANULOCYTES NFR BLD AUTO: 0.3 % (ref 0–0.5)
LYMPHOCYTES # BLD AUTO: 0.97 10*3/MM3 (ref 0.7–3.1)
LYMPHOCYTES NFR BLD AUTO: 8.6 % (ref 19.6–45.3)
MAGNESIUM SERPL-MCNC: 2 MG/DL (ref 1.6–2.4)
MCH RBC QN AUTO: 29.4 PG (ref 26.6–33)
MCHC RBC AUTO-ENTMCNC: 32 G/DL (ref 31.5–35.7)
MCV RBC AUTO: 92 FL (ref 79–97)
MONOCYTES # BLD AUTO: 0.84 10*3/MM3 (ref 0.1–0.9)
MONOCYTES NFR BLD AUTO: 7.5 % (ref 5–12)
NEUTROPHILS NFR BLD AUTO: 83.4 % (ref 42.7–76)
NEUTROPHILS NFR BLD AUTO: 9.37 10*3/MM3 (ref 1.7–7)
NRBC BLD AUTO-RTO: 0 /100 WBC (ref 0–0.2)
PHOSPHATE SERPL-MCNC: 4.1 MG/DL (ref 2.5–4.5)
PLATELET # BLD AUTO: 219 10*3/MM3 (ref 140–450)
PMV BLD AUTO: 11.1 FL (ref 6–12)
POTASSIUM SERPL-SCNC: 4.3 MMOL/L (ref 3.5–5.2)
RBC # BLD AUTO: 5.13 10*6/MM3 (ref 4.14–5.8)
SODIUM SERPL-SCNC: 139 MMOL/L (ref 136–145)
WBC NRBC COR # BLD AUTO: 11.23 10*3/MM3 (ref 3.4–10.8)

## 2025-01-04 PROCEDURE — 84100 ASSAY OF PHOSPHORUS: CPT | Performed by: INTERNAL MEDICINE

## 2025-01-04 PROCEDURE — 94799 UNLISTED PULMONARY SVC/PX: CPT

## 2025-01-04 PROCEDURE — 63710000001 PREDNISONE PER 1 MG: Performed by: INTERNAL MEDICINE

## 2025-01-04 PROCEDURE — 94664 DEMO&/EVAL PT USE INHALER: CPT

## 2025-01-04 PROCEDURE — 74018 RADEX ABDOMEN 1 VIEW: CPT

## 2025-01-04 PROCEDURE — 80048 BASIC METABOLIC PNL TOTAL CA: CPT | Performed by: INTERNAL MEDICINE

## 2025-01-04 PROCEDURE — 83735 ASSAY OF MAGNESIUM: CPT | Performed by: INTERNAL MEDICINE

## 2025-01-04 PROCEDURE — 99232 SBSQ HOSP IP/OBS MODERATE 35: CPT | Performed by: INTERNAL MEDICINE

## 2025-01-04 PROCEDURE — 94760 N-INVAS EAR/PLS OXIMETRY 1: CPT

## 2025-01-04 PROCEDURE — 94761 N-INVAS EAR/PLS OXIMETRY MLT: CPT

## 2025-01-04 PROCEDURE — 85025 COMPLETE CBC W/AUTO DIFF WBC: CPT | Performed by: INTERNAL MEDICINE

## 2025-01-04 PROCEDURE — 25010000002 FUROSEMIDE PER 20 MG: Performed by: STUDENT IN AN ORGANIZED HEALTH CARE EDUCATION/TRAINING PROGRAM

## 2025-01-04 RX ORDER — POLYETHYLENE GLYCOL 3350 17 G/17G
17 POWDER, FOR SOLUTION ORAL 2 TIMES DAILY
Status: DISCONTINUED | OUTPATIENT
Start: 2025-01-04 | End: 2025-01-09 | Stop reason: HOSPADM

## 2025-01-04 RX ORDER — GUAIFENESIN/DEXTROMETHORPHAN 100-10MG/5
5 SYRUP ORAL 4 TIMES DAILY
Status: DISCONTINUED | OUTPATIENT
Start: 2025-01-04 | End: 2025-01-09 | Stop reason: HOSPADM

## 2025-01-04 RX ADMIN — ASPIRIN 81 MG: 81 TABLET, CHEWABLE ORAL at 09:42

## 2025-01-04 RX ADMIN — BUDESONIDE 0.5 MG: 0.5 INHALANT RESPIRATORY (INHALATION) at 18:41

## 2025-01-04 RX ADMIN — ALBUTEROL SULFATE 2.5 MG: 2.5 SOLUTION RESPIRATORY (INHALATION) at 11:53

## 2025-01-04 RX ADMIN — DOXYCYCLINE 100 MG: 100 CAPSULE ORAL at 09:42

## 2025-01-04 RX ADMIN — ARFORMOTEROL TARTRATE 15 MCG: 15 SOLUTION RESPIRATORY (INHALATION) at 18:41

## 2025-01-04 RX ADMIN — GUAIFENESIN AND DEXTROMETHORPHAN 5 ML: 100; 10 SYRUP ORAL at 18:41

## 2025-01-04 RX ADMIN — FAMOTIDINE 20 MG: 20 TABLET, FILM COATED ORAL at 08:01

## 2025-01-04 RX ADMIN — FAMOTIDINE 20 MG: 20 TABLET, FILM COATED ORAL at 18:34

## 2025-01-04 RX ADMIN — FUROSEMIDE 40 MG: 10 INJECTION, SOLUTION INTRAMUSCULAR; INTRAVENOUS at 05:41

## 2025-01-04 RX ADMIN — HYDROCODONE BITARTRATE AND ACETAMINOPHEN 1 TABLET: 7.5; 325 TABLET ORAL at 03:31

## 2025-01-04 RX ADMIN — DOXYCYCLINE 100 MG: 100 CAPSULE ORAL at 20:49

## 2025-01-04 RX ADMIN — PREGABALIN 100 MG: 100 CAPSULE ORAL at 09:42

## 2025-01-04 RX ADMIN — Medication 10 ML: at 20:49

## 2025-01-04 RX ADMIN — HYDROCODONE BITARTRATE AND ACETAMINOPHEN 1 TABLET: 7.5; 325 TABLET ORAL at 09:42

## 2025-01-04 RX ADMIN — ARFORMOTEROL TARTRATE 15 MCG: 15 SOLUTION RESPIRATORY (INHALATION) at 06:26

## 2025-01-04 RX ADMIN — PREDNISONE 40 MG: 20 TABLET ORAL at 08:00

## 2025-01-04 RX ADMIN — Medication 10 ML: at 09:42

## 2025-01-04 RX ADMIN — HYDROCODONE BITARTRATE AND ACETAMINOPHEN 1 TABLET: 7.5; 325 TABLET ORAL at 20:49

## 2025-01-04 RX ADMIN — PREGABALIN 100 MG: 100 CAPSULE ORAL at 20:49

## 2025-01-04 RX ADMIN — GUAIFENESIN AND DEXTROMETHORPHAN 5 ML: 100; 10 SYRUP ORAL at 20:58

## 2025-01-04 RX ADMIN — ALBUTEROL SULFATE 2.5 MG: 2.5 SOLUTION RESPIRATORY (INHALATION) at 18:41

## 2025-01-04 RX ADMIN — POLYETHYLENE GLYCOL 3350 17 G: 17 POWDER, FOR SOLUTION ORAL at 20:49

## 2025-01-04 RX ADMIN — BUDESONIDE 0.5 MG: 0.5 INHALANT RESPIRATORY (INHALATION) at 06:26

## 2025-01-04 RX ADMIN — ALBUTEROL SULFATE 2.5 MG: 2.5 SOLUTION RESPIRATORY (INHALATION) at 06:25

## 2025-01-04 RX ADMIN — METOPROLOL SUCCINATE 50 MG: 50 TABLET, EXTENDED RELEASE ORAL at 09:42

## 2025-01-04 RX ADMIN — GUAIFENESIN 600 MG: 600 TABLET ORAL at 09:42

## 2025-01-04 RX ADMIN — SENNOSIDES AND DOCUSATE SODIUM 1 TABLET: 50; 8.6 TABLET ORAL at 20:49

## 2025-01-04 NOTE — PLAN OF CARE
Goal Outcome Evaluation:  Plan of Care Reviewed With: patient        Progress: no change  Outcome Evaluation: No acute changes during this shift. VSS. Pain managed per MAR.

## 2025-01-04 NOTE — PROGRESS NOTES
[facility]   Hospitalist Progress Note    Date of admission: 1/2/2025  Patient Name: Radames Quinteros  1960  Date: 1/4/2025      Subjective     Chief Complaint   Patient presents with    Shortness of Breath       Interval Followup: Still infrequent coughing but not as severe as previously.  Nonproductive overall.  No overt fevers.  Has had intermittent levels of abdominal pain feels like his hernia has grown some since it first showed up yesterday.  Does not hurt at rest overall but does hurt when he is coughing, has had flatus and a bowel movement recently.  No nausea or vomiting.  Is asking to speak to surgery about options and would asked him to come by although acutely since is not having signs of incarceration nothing would be done acutely and we can try an abdominal binder form he is interested in this in the meantime.      Objective     Vitals:   Temp:  [97.3 °F (36.3 °C)-98.2 °F (36.8 °C)] 97.3 °F (36.3 °C)  Heart Rate:  [70-93] 82  Resp:  [20-28] 20  BP: (127-155)/(72-93) 136/72  Flow (L/min) (Oxygen Therapy):  [3] 3    Physical Exam  Awake conversant in bed morbidly obese  Dyspneic with conversation, coughing spells with deep inhalation, expiratory wheezing, moving air little better than yesterday on nasal cannula  RRR no pitting edema today  Obese abdomen full, has mild left umbilical herniation little bit bigger than yesterday tender to touch but reducible, positive bowel sounds    Result Review:  Vital signs, labs and recent relevant imaging reviewed.      CBC          1/2/2025    18:23 1/3/2025    04:10 1/4/2025    04:31   CBC   WBC 6.69  4.93  11.23    RBC 5.41  5.28  5.13    Hemoglobin 15.7  15.5  15.1    Hematocrit 48.6  47.7  47.2    MCV 89.8  90.3  92.0    MCH 29.0  29.4  29.4    MCHC 32.3  32.5  32.0    RDW 14.2  14.2  14.0    Platelets 191  186  219      CMP          1/2/2025    18:23 1/3/2025    04:10 1/4/2025    04:31   CMP   Glucose 126  146  139    BUN 18  17  34    Creatinine  1.24  1.11  1.37    EGFR 64.9  74.2  57.6    Sodium 136  138  139    Potassium 4.9  4.5  4.3    Chloride 102  105  104    Calcium 9.0  8.9  8.8    Total Protein 7.7      Albumin 4.0      Globulin 3.7      Total Bilirubin 0.8      Alkaline Phosphatase 94      AST (SGOT) 30      ALT (SGPT) 21      Albumin/Globulin Ratio 1.1      BUN/Creatinine Ratio 14.5  15.3  24.8    Anion Gap 14.3  11.8  12.1          acetaminophen    albuterol    aluminum-magnesium hydroxide-simethicone    senna-docusate sodium **AND** polyethylene glycol **AND** bisacodyl **AND** bisacodyl    Diclofenac Sodium    HYDROcodone-acetaminophen    hydrOXYzine    influenza vaccine    Lidocaine    melatonin    nicotine    nitroglycerin    ondansetron    prochlorperazine    sodium chloride    sodium chloride    sodium chloride    albuterol, 2.5 mg, Nebulization, Q6H - RT  arformoterol, 15 mcg, Nebulization, BID - RT  aspirin, 81 mg, Oral, Daily  budesonide, 0.5 mg, Nebulization, BID - RT  [Held by provider] celecoxib, 200 mg, Oral, Daily  doxycycline, 100 mg, Oral, Q12H  famotidine, 20 mg, Oral, BID AC  [Held by provider] furosemide, 40 mg, Intravenous, Q12H  guaiFENesin-dextromethorphan, 5 mL, Oral, 4x Daily  [Held by provider] losartan, 100 mg, Oral, Q24H  metoprolol succinate XL, 50 mg, Oral, Daily  predniSONE, 40 mg, Oral, Daily With Breakfast  pregabalin, 100 mg, Oral, BID  senna-docusate sodium, 1 tablet, Oral, BID  sodium chloride, 10 mL, Intravenous, Q12H        CT Angiogram Chest Pulmonary Embolism    Result Date: 1/2/2025  No central (or proximal) pulmonary embolism. Otherwise, the study is not diagnostic for evaluation of more distal pulmonary emboli due to limitations of the contrast bolus. No thoracic aortic aneurysm or dissection. No acute infiltrate. Please see above comments for further detail.   Portions of this note were completed with a voice recognition program.  Electronically Signed By-Radames Riggs MD On:1/2/2025 11:56 PM      XR  Chest 1 View    Result Date: 1/2/2025  Impression: No acute process. Electronically Signed: Aakash Rogers MD  1/2/2025 8:02 PM EST  Workstation ID: [id number]     Assessment / Plan     Summary: 64 y.o. with obesity, SHOBHA not tolerant of NIPPV, Hypertension who presented with 5 to 6 days of worsening shortness of air found to have RSV and suspected diastolic CHF exacerbation    Assessment/Plan (clinically significant if listed here)  Acute hypoxemic respiratory failure  RSV URI with bronchitis and wheezing  Diastolic CHF exacerbation - EF 55% per 1/2025  Probable COPD (not formally diagnosed) w/ exacerbation  Left abdominal hernia  Hypertension  SHOBHA not compliant/does not use home NIPPV  Morbid obesity BMI 42.4  Former smoker quit in 2022, 44-pack-year hx    Echo EF 55%, diastolic dysfunction,  Cxr non acute infiltrate on personal review    Creatinine increased no significant swelling today, hold dose and reassess creatinine and I's and O's tomorrow.  Continue low-sodium diet  Check KUB, continue bowel regimen, try abdominal binder see if helps with symptoms and will schedule cough medicine to help decrease symptoms.  Discussed with surgery per pt request, can f/u outpt from their perspective   Continue prednisone, Brovana, Pulmicort, scheduled nebulizers  needs pulm follow-up and PFTs unclear if copd (suspected previously, needs pfts).  No acute infiltrate on CT PE,   continue Doxy 5-day course  Wean O2 as able, decreased to 1L today   Cont metoprolol, hold losartan monitor bp  Cont home lyrica  Cont famotidine  Cont aspirin  PT/OT  Check a.m. CBC, BMP, magnesium, phosphorus  Continue hospitalization at current level of care, tele, will be several days before stable for dc likely    Dispo: wants to go home once medically stable.   D/w SW    VTE Prophylaxis:  Mechanical VTE prophylaxis orders are present.      Code Status (Patient has no pulse and is not breathing): CPR (Attempt to Resuscitate)  Medical Interventions  (Patient has pulse or is breathing): Full Support

## 2025-01-05 LAB
ANION GAP SERPL CALCULATED.3IONS-SCNC: 13.5 MMOL/L (ref 5–15)
BASOPHILS # BLD AUTO: 0.02 10*3/MM3 (ref 0–0.2)
BASOPHILS NFR BLD AUTO: 0.2 % (ref 0–1.5)
BUN SERPL-MCNC: 40 MG/DL (ref 8–23)
BUN/CREAT SERPL: 29.6 (ref 7–25)
CALCIUM SPEC-SCNC: 8.9 MG/DL (ref 8.6–10.5)
CHLORIDE SERPL-SCNC: 101 MMOL/L (ref 98–107)
CO2 SERPL-SCNC: 25.5 MMOL/L (ref 22–29)
CREAT SERPL-MCNC: 1.35 MG/DL (ref 0.76–1.27)
DEPRECATED RDW RBC AUTO: 47.5 FL (ref 37–54)
EGFRCR SERPLBLD CKD-EPI 2021: 58.6 ML/MIN/1.73
EOSINOPHIL # BLD AUTO: 0.03 10*3/MM3 (ref 0–0.4)
EOSINOPHIL NFR BLD AUTO: 0.3 % (ref 0.3–6.2)
ERYTHROCYTE [DISTWIDTH] IN BLOOD BY AUTOMATED COUNT: 14.2 % (ref 12.3–15.4)
GLUCOSE SERPL-MCNC: 128 MG/DL (ref 65–99)
HCT VFR BLD AUTO: 45.2 % (ref 37.5–51)
HGB BLD-MCNC: 14.4 G/DL (ref 13–17.7)
IMM GRANULOCYTES # BLD AUTO: 0.03 10*3/MM3 (ref 0–0.05)
IMM GRANULOCYTES NFR BLD AUTO: 0.3 % (ref 0–0.5)
LYMPHOCYTES # BLD AUTO: 1.59 10*3/MM3 (ref 0.7–3.1)
LYMPHOCYTES NFR BLD AUTO: 15.5 % (ref 19.6–45.3)
MAGNESIUM SERPL-MCNC: 2 MG/DL (ref 1.6–2.4)
MCH RBC QN AUTO: 29 PG (ref 26.6–33)
MCHC RBC AUTO-ENTMCNC: 31.9 G/DL (ref 31.5–35.7)
MCV RBC AUTO: 90.9 FL (ref 79–97)
MONOCYTES # BLD AUTO: 1.07 10*3/MM3 (ref 0.1–0.9)
MONOCYTES NFR BLD AUTO: 10.4 % (ref 5–12)
NEUTROPHILS NFR BLD AUTO: 7.54 10*3/MM3 (ref 1.7–7)
NEUTROPHILS NFR BLD AUTO: 73.3 % (ref 42.7–76)
NRBC BLD AUTO-RTO: 0 /100 WBC (ref 0–0.2)
PHOSPHATE SERPL-MCNC: 4.1 MG/DL (ref 2.5–4.5)
PLATELET # BLD AUTO: 196 10*3/MM3 (ref 140–450)
PMV BLD AUTO: 11.1 FL (ref 6–12)
POTASSIUM SERPL-SCNC: 3.8 MMOL/L (ref 3.5–5.2)
RBC # BLD AUTO: 4.97 10*6/MM3 (ref 4.14–5.8)
SODIUM SERPL-SCNC: 140 MMOL/L (ref 136–145)
WBC NRBC COR # BLD AUTO: 10.28 10*3/MM3 (ref 3.4–10.8)

## 2025-01-05 PROCEDURE — 94664 DEMO&/EVAL PT USE INHALER: CPT

## 2025-01-05 PROCEDURE — 84100 ASSAY OF PHOSPHORUS: CPT | Performed by: INTERNAL MEDICINE

## 2025-01-05 PROCEDURE — 99232 SBSQ HOSP IP/OBS MODERATE 35: CPT | Performed by: INTERNAL MEDICINE

## 2025-01-05 PROCEDURE — 94799 UNLISTED PULMONARY SVC/PX: CPT

## 2025-01-05 PROCEDURE — 63710000001 PREDNISONE PER 1 MG: Performed by: INTERNAL MEDICINE

## 2025-01-05 PROCEDURE — 83735 ASSAY OF MAGNESIUM: CPT | Performed by: INTERNAL MEDICINE

## 2025-01-05 PROCEDURE — 80048 BASIC METABOLIC PNL TOTAL CA: CPT | Performed by: INTERNAL MEDICINE

## 2025-01-05 PROCEDURE — 85025 COMPLETE CBC W/AUTO DIFF WBC: CPT | Performed by: INTERNAL MEDICINE

## 2025-01-05 PROCEDURE — 94761 N-INVAS EAR/PLS OXIMETRY MLT: CPT

## 2025-01-05 RX ADMIN — ARFORMOTEROL TARTRATE 15 MCG: 15 SOLUTION RESPIRATORY (INHALATION) at 06:21

## 2025-01-05 RX ADMIN — BUDESONIDE 0.5 MG: 0.5 INHALANT RESPIRATORY (INHALATION) at 18:48

## 2025-01-05 RX ADMIN — Medication 10 ML: at 08:11

## 2025-01-05 RX ADMIN — GUAIFENESIN AND DEXTROMETHORPHAN 5 ML: 100; 10 SYRUP ORAL at 17:19

## 2025-01-05 RX ADMIN — DOXYCYCLINE 100 MG: 100 CAPSULE ORAL at 08:10

## 2025-01-05 RX ADMIN — ALBUTEROL SULFATE 2.5 MG: 2.5 SOLUTION RESPIRATORY (INHALATION) at 13:54

## 2025-01-05 RX ADMIN — POLYETHYLENE GLYCOL 3350 17 G: 17 POWDER, FOR SOLUTION ORAL at 08:10

## 2025-01-05 RX ADMIN — DOXYCYCLINE 100 MG: 100 CAPSULE ORAL at 21:42

## 2025-01-05 RX ADMIN — GUAIFENESIN AND DEXTROMETHORPHAN 5 ML: 100; 10 SYRUP ORAL at 11:49

## 2025-01-05 RX ADMIN — ALBUTEROL SULFATE 2.5 MG: 2.5 SOLUTION RESPIRATORY (INHALATION) at 06:21

## 2025-01-05 RX ADMIN — PREGABALIN 100 MG: 100 CAPSULE ORAL at 08:10

## 2025-01-05 RX ADMIN — FAMOTIDINE 20 MG: 20 TABLET, FILM COATED ORAL at 17:19

## 2025-01-05 RX ADMIN — GUAIFENESIN AND DEXTROMETHORPHAN 5 ML: 100; 10 SYRUP ORAL at 21:42

## 2025-01-05 RX ADMIN — HYDROCODONE BITARTRATE AND ACETAMINOPHEN 1 TABLET: 7.5; 325 TABLET ORAL at 03:36

## 2025-01-05 RX ADMIN — ALBUTEROL SULFATE 2.5 MG: 2.5 SOLUTION RESPIRATORY (INHALATION) at 18:48

## 2025-01-05 RX ADMIN — ALBUTEROL SULFATE 2.5 MG: 2.5 SOLUTION RESPIRATORY (INHALATION) at 00:36

## 2025-01-05 RX ADMIN — HYDROCODONE BITARTRATE AND ACETAMINOPHEN 1 TABLET: 7.5; 325 TABLET ORAL at 09:37

## 2025-01-05 RX ADMIN — Medication 10 ML: at 21:42

## 2025-01-05 RX ADMIN — HYDROCODONE BITARTRATE AND ACETAMINOPHEN 1 TABLET: 7.5; 325 TABLET ORAL at 15:45

## 2025-01-05 RX ADMIN — PREGABALIN 100 MG: 100 CAPSULE ORAL at 21:42

## 2025-01-05 RX ADMIN — FAMOTIDINE 20 MG: 20 TABLET, FILM COATED ORAL at 08:10

## 2025-01-05 RX ADMIN — ARFORMOTEROL TARTRATE 15 MCG: 15 SOLUTION RESPIRATORY (INHALATION) at 18:48

## 2025-01-05 RX ADMIN — SENNOSIDES AND DOCUSATE SODIUM 1 TABLET: 50; 8.6 TABLET ORAL at 08:10

## 2025-01-05 RX ADMIN — BUDESONIDE 0.5 MG: 0.5 INHALANT RESPIRATORY (INHALATION) at 06:21

## 2025-01-05 RX ADMIN — METOPROLOL SUCCINATE 50 MG: 50 TABLET, EXTENDED RELEASE ORAL at 08:10

## 2025-01-05 RX ADMIN — HYDROCODONE BITARTRATE AND ACETAMINOPHEN 1 TABLET: 7.5; 325 TABLET ORAL at 21:42

## 2025-01-05 RX ADMIN — GUAIFENESIN AND DEXTROMETHORPHAN 5 ML: 100; 10 SYRUP ORAL at 08:10

## 2025-01-05 RX ADMIN — ASPIRIN 81 MG: 81 TABLET, CHEWABLE ORAL at 08:10

## 2025-01-05 RX ADMIN — PREDNISONE 40 MG: 20 TABLET ORAL at 08:09

## 2025-01-05 NOTE — PLAN OF CARE
Goal Outcome Evaluation:  Plan of Care Reviewed With: patient        Progress: no change  Outcome Evaluation: Treated patient for abdominal pain x 2 overnight, improvement noted per patient. Abdominal binder applied. No further complaints at this time. Call light within reach.

## 2025-01-05 NOTE — PROGRESS NOTES
Respiratory Therapist Broncho-Pulmonary Hygiene Progress Note      Patient Name:  Radames Quinteros  YOB: 1960    Radames Quinteros meets the qualification for Level 2 of the Bronco-Pulmonary Hygiene Protocol. This was based on my daily patient assessment and includes review of chest x-ray results, cough ability and quality, oxygenation, secretions or risk for secretion development and patient mobility.     Broncho-Pulmonary Hygiene Assessment:    Level of Movement: Actively changing positions without assistance  Alert/ oriented/ cooperative  1  Breath Sounds: Diminished and/or coarse rhonchi  2  Cough: Strong, effective and/or frequent  2  Chest X-Ray: Possible signs of consolidation and/or atelectasis or clear.   12/3: . Lungs are without consolidation  1  Sputum Productions: Able to produce small to moderate amount, of moderately thick secretions  PER PATIENT  2  History and Physical: Chronic condition  SHOBHA (no NPPV use at home, refuses.)  1  SpO2 to Oxygen Need: greater than 92% on room air or  less than 3L nasal canula  1  Current SpO2 is: 93 on room air    Based on this information, I have completed the following interventions: Teach/Instruct patient on cough and deep breathe and Aerobika with bronchodialtor medication or TID      Electronically signed by Marina Gracia RRT, 01/05/25, 6:24 AM EST.

## 2025-01-05 NOTE — PLAN OF CARE
Goal Outcome Evaluation:              Outcome Evaluation: No acute events this shift.  Pain controlled with PRN pain meds.  VSS.

## 2025-01-05 NOTE — PROGRESS NOTES
[facility]   Hospitalist Progress Note    Date of admission: 1/2/2025  Patient Name: Radames Quinteros  1960  Date: 1/5/2025      Subjective     Chief Complaint   Patient presents with    Shortness of Breath       Interval Followup: less soa.  Binder is helping with pain.  Having bm.      Objective     Vitals:   Temp:  [97.3 °F (36.3 °C)-97.9 °F (36.6 °C)] 97.3 °F (36.3 °C)  Heart Rate:  [67-86] 80  Resp:  [18-20] 20  BP: (129-160)/(83-89) 129/85  Flow (L/min) (Oxygen Therapy):  [0.5-1.5] 0.5    Physical Exam  Awake conversant in bed morbidly obese  Less Dyspneic with conversation, improving aeration but still perrs wheezing  RRR no pitting edema today  Obese abdomen, binder in place, no significant pain    Result Review:  Vital signs, labs and recent relevant imaging reviewed.      CBC          1/3/2025    04:10 1/4/2025    04:31 1/5/2025    04:28   CBC   WBC 4.93  11.23  10.28    RBC 5.28  5.13  4.97    Hemoglobin 15.5  15.1  14.4    Hematocrit 47.7  47.2  45.2    MCV 90.3  92.0  90.9    MCH 29.4  29.4  29.0    MCHC 32.5  32.0  31.9    RDW 14.2  14.0  14.2    Platelets 186  219  196      CMP          1/3/2025    04:10 1/4/2025    04:31 1/5/2025    04:28   CMP   Glucose 146  139  128    BUN 17  34  40    Creatinine 1.11  1.37  1.35    EGFR 74.2  57.6  58.6    Sodium 138  139  140    Potassium 4.5  4.3  3.8    Chloride 105  104  101    Calcium 8.9  8.8  8.9    BUN/Creatinine Ratio 15.3  24.8  29.6    Anion Gap 11.8  12.1  13.5          acetaminophen    albuterol    aluminum-magnesium hydroxide-simethicone    senna-docusate sodium **AND** polyethylene glycol **AND** bisacodyl **AND** bisacodyl    Diclofenac Sodium    HYDROcodone-acetaminophen    hydrOXYzine    influenza vaccine    Lidocaine    melatonin    nicotine    nitroglycerin    ondansetron    prochlorperazine    sodium chloride    sodium chloride    sodium chloride    albuterol, 2.5 mg, Nebulization, Q6H - RT  arformoterol, 15 mcg, Nebulization,  BID - RT  aspirin, 81 mg, Oral, Daily  budesonide, 0.5 mg, Nebulization, BID - RT  [Held by provider] celecoxib, 200 mg, Oral, Daily  doxycycline, 100 mg, Oral, Q12H  famotidine, 20 mg, Oral, BID AC  [Held by provider] furosemide, 40 mg, Intravenous, Q12H  guaiFENesin-dextromethorphan, 5 mL, Oral, 4x Daily  [Held by provider] losartan, 100 mg, Oral, Q24H  metoprolol succinate XL, 50 mg, Oral, Daily  polyethylene glycol, 17 g, Oral, BID  predniSONE, 40 mg, Oral, Daily With Breakfast  pregabalin, 100 mg, Oral, BID  senna-docusate sodium, 1 tablet, Oral, BID  sodium chloride, 10 mL, Intravenous, Q12H        XR Abdomen KUB    Result Date: 1/4/2025  Impression: Moderate colonic stool without signs of obstruction. Electronically Signed: Vish Lindsey MD  1/4/2025 5:16 PM EST  Workstation ID: [id number]    CT Angiogram Chest Pulmonary Embolism    Result Date: 1/2/2025  No central (or proximal) pulmonary embolism. Otherwise, the study is not diagnostic for evaluation of more distal pulmonary emboli due to limitations of the contrast bolus. No thoracic aortic aneurysm or dissection. No acute infiltrate. Please see above comments for further detail.   Portions of this note were completed with a voice recognition program.  Electronically Signed By-Radames Riggs MD On:1/2/2025 11:56 PM      XR Chest 1 View    Result Date: 1/2/2025  Impression: No acute process. Electronically Signed: Aakash Rogers MD  1/2/2025 8:02 PM EST  Workstation ID: [id number]     Assessment / Plan     Summary: 64 y.o. with obesity, SHOBHA not tolerant of NIPPV, Hypertension who presented with 5 to 6 days of worsening shortness of air found to have RSV and suspected diastolic CHF exacerbation    Assessment/Plan (clinically significant if listed here)  Acute hypoxemic respiratory failure  RSV URI with bronchitis and wheezing  Diastolic CHF exacerbation - EF 55% per 1/2025  Probable COPD (not formally diagnosed) w/ exacerbation  Left abdominal  hernia  Hypertension  SHOBHA not compliant/does not use home NIPPV  Morbid obesity BMI 42.4  Former smoker quit in 2022, 44-pack-year hx    Echo EF 55%, diastolic dysfunction,  Cxr non acute infiltrate on admission    Cr stable but slight elevation, recheck in am, cont low sod diet, likey oral lasix tomorrow   Kub without obstruction, has notable stool, increase scheduled regimen/needs a better regimen for discharge  Continue abdominal binder see if helps with symptoms and schedule cough medicine to help decrease symptoms.  Discussed with surgery per pt request, can f/u outpt from their perspective   Continue prednisone, Brovana, Pulmicort, scheduled nebulizers  needs pulm follow-up and PFTs unclear if copd (suspected previously, needs pfts).  No acute infiltrate on CT PE,   continue Doxy 5-day course  Wean O2 as able, decreased to 1L today   Cont metoprolol, hold losartan monitor bp  had one brief ep of nsvt, asymptomatic, monitor  Cont home lyrica  Cont famotidine  Cont aspirin  PT/OT  Check a.m. CBC, BMP, magnesium, phosphorus  Continue hospitalization at current level of care, tele,     Dispo: wants to go home once medically stable.   D/w SW    VTE Prophylaxis:  Mechanical VTE prophylaxis orders are present.      Code Status (Patient has no pulse and is not breathing): CPR (Attempt to Resuscitate)  Medical Interventions (Patient has pulse or is breathing): Full Support

## 2025-01-06 LAB
ANION GAP SERPL CALCULATED.3IONS-SCNC: 10.3 MMOL/L (ref 5–15)
BASOPHILS # BLD AUTO: 0.01 10*3/MM3 (ref 0–0.2)
BASOPHILS NFR BLD AUTO: 0.1 % (ref 0–1.5)
BUN SERPL-MCNC: 33 MG/DL (ref 8–23)
BUN/CREAT SERPL: 26.2 (ref 7–25)
CALCIUM SPEC-SCNC: 9 MG/DL (ref 8.6–10.5)
CHLORIDE SERPL-SCNC: 101 MMOL/L (ref 98–107)
CO2 SERPL-SCNC: 28.7 MMOL/L (ref 22–29)
CREAT SERPL-MCNC: 1.26 MG/DL (ref 0.76–1.27)
DEPRECATED RDW RBC AUTO: 47.1 FL (ref 37–54)
EGFRCR SERPLBLD CKD-EPI 2021: 63.7 ML/MIN/1.73
EOSINOPHIL # BLD AUTO: 0.11 10*3/MM3 (ref 0–0.4)
EOSINOPHIL NFR BLD AUTO: 1.3 % (ref 0.3–6.2)
ERYTHROCYTE [DISTWIDTH] IN BLOOD BY AUTOMATED COUNT: 14.1 % (ref 12.3–15.4)
GLUCOSE SERPL-MCNC: 102 MG/DL (ref 65–99)
HCT VFR BLD AUTO: 46.1 % (ref 37.5–51)
HGB BLD-MCNC: 14.8 G/DL (ref 13–17.7)
IMM GRANULOCYTES # BLD AUTO: 0.02 10*3/MM3 (ref 0–0.05)
IMM GRANULOCYTES NFR BLD AUTO: 0.2 % (ref 0–0.5)
LYMPHOCYTES # BLD AUTO: 1.78 10*3/MM3 (ref 0.7–3.1)
LYMPHOCYTES NFR BLD AUTO: 21.5 % (ref 19.6–45.3)
MAGNESIUM SERPL-MCNC: 1.9 MG/DL (ref 1.6–2.4)
MCH RBC QN AUTO: 29.4 PG (ref 26.6–33)
MCHC RBC AUTO-ENTMCNC: 32.1 G/DL (ref 31.5–35.7)
MCV RBC AUTO: 91.5 FL (ref 79–97)
MONOCYTES # BLD AUTO: 0.73 10*3/MM3 (ref 0.1–0.9)
MONOCYTES NFR BLD AUTO: 8.8 % (ref 5–12)
NEUTROPHILS NFR BLD AUTO: 5.61 10*3/MM3 (ref 1.7–7)
NEUTROPHILS NFR BLD AUTO: 68.1 % (ref 42.7–76)
NRBC BLD AUTO-RTO: 0 /100 WBC (ref 0–0.2)
PHOSPHATE SERPL-MCNC: 3.2 MG/DL (ref 2.5–4.5)
PLATELET # BLD AUTO: 204 10*3/MM3 (ref 140–450)
PMV BLD AUTO: 11.3 FL (ref 6–12)
POTASSIUM SERPL-SCNC: 4.6 MMOL/L (ref 3.5–5.2)
RBC # BLD AUTO: 5.04 10*6/MM3 (ref 4.14–5.8)
SODIUM SERPL-SCNC: 140 MMOL/L (ref 136–145)
WBC NRBC COR # BLD AUTO: 8.26 10*3/MM3 (ref 3.4–10.8)

## 2025-01-06 PROCEDURE — 63710000001 PREDNISONE PER 1 MG: Performed by: INTERNAL MEDICINE

## 2025-01-06 PROCEDURE — 94799 UNLISTED PULMONARY SVC/PX: CPT

## 2025-01-06 PROCEDURE — 99232 SBSQ HOSP IP/OBS MODERATE 35: CPT | Performed by: INTERNAL MEDICINE

## 2025-01-06 PROCEDURE — 85025 COMPLETE CBC W/AUTO DIFF WBC: CPT | Performed by: INTERNAL MEDICINE

## 2025-01-06 PROCEDURE — 94664 DEMO&/EVAL PT USE INHALER: CPT

## 2025-01-06 PROCEDURE — 84100 ASSAY OF PHOSPHORUS: CPT | Performed by: INTERNAL MEDICINE

## 2025-01-06 PROCEDURE — 25010000002 FUROSEMIDE PER 20 MG: Performed by: INTERNAL MEDICINE

## 2025-01-06 PROCEDURE — 94761 N-INVAS EAR/PLS OXIMETRY MLT: CPT

## 2025-01-06 PROCEDURE — 80048 BASIC METABOLIC PNL TOTAL CA: CPT | Performed by: INTERNAL MEDICINE

## 2025-01-06 PROCEDURE — 83735 ASSAY OF MAGNESIUM: CPT | Performed by: INTERNAL MEDICINE

## 2025-01-06 RX ORDER — LACTULOSE 10 G/15ML
10 SOLUTION ORAL DAILY
Status: DISCONTINUED | OUTPATIENT
Start: 2025-01-06 | End: 2025-01-09 | Stop reason: HOSPADM

## 2025-01-06 RX ORDER — FUROSEMIDE 10 MG/ML
20 INJECTION INTRAMUSCULAR; INTRAVENOUS ONCE
Status: COMPLETED | OUTPATIENT
Start: 2025-01-06 | End: 2025-01-06

## 2025-01-06 RX ORDER — ACETYLCYSTEINE 200 MG/ML
3 SOLUTION ORAL; RESPIRATORY (INHALATION)
Status: DISCONTINUED | OUTPATIENT
Start: 2025-01-06 | End: 2025-01-09 | Stop reason: HOSPADM

## 2025-01-06 RX ADMIN — FUROSEMIDE 20 MG: 10 INJECTION, SOLUTION INTRAMUSCULAR; INTRAVENOUS at 09:07

## 2025-01-06 RX ADMIN — Medication 10 ML: at 09:09

## 2025-01-06 RX ADMIN — FAMOTIDINE 20 MG: 20 TABLET, FILM COATED ORAL at 09:08

## 2025-01-06 RX ADMIN — PREGABALIN 100 MG: 100 CAPSULE ORAL at 20:22

## 2025-01-06 RX ADMIN — ACETYLCYSTEINE 3 ML: 200 SOLUTION ORAL; RESPIRATORY (INHALATION) at 19:34

## 2025-01-06 RX ADMIN — ARFORMOTEROL TARTRATE 15 MCG: 15 SOLUTION RESPIRATORY (INHALATION) at 19:24

## 2025-01-06 RX ADMIN — ALBUTEROL SULFATE 2.5 MG: 2.5 SOLUTION RESPIRATORY (INHALATION) at 06:38

## 2025-01-06 RX ADMIN — DOXYCYCLINE 100 MG: 100 CAPSULE ORAL at 09:08

## 2025-01-06 RX ADMIN — GUAIFENESIN AND DEXTROMETHORPHAN 5 ML: 100; 10 SYRUP ORAL at 13:16

## 2025-01-06 RX ADMIN — PREGABALIN 100 MG: 100 CAPSULE ORAL at 09:08

## 2025-01-06 RX ADMIN — FAMOTIDINE 20 MG: 20 TABLET, FILM COATED ORAL at 17:07

## 2025-01-06 RX ADMIN — ALBUTEROL SULFATE 2.5 MG: 2.5 SOLUTION RESPIRATORY (INHALATION) at 14:13

## 2025-01-06 RX ADMIN — PREDNISONE 40 MG: 20 TABLET ORAL at 09:08

## 2025-01-06 RX ADMIN — GUAIFENESIN AND DEXTROMETHORPHAN 5 ML: 100; 10 SYRUP ORAL at 17:06

## 2025-01-06 RX ADMIN — HYDROCODONE BITARTRATE AND ACETAMINOPHEN 1 TABLET: 7.5; 325 TABLET ORAL at 10:54

## 2025-01-06 RX ADMIN — ALBUTEROL SULFATE 2.5 MG: 2.5 SOLUTION RESPIRATORY (INHALATION) at 04:40

## 2025-01-06 RX ADMIN — HYDROCODONE BITARTRATE AND ACETAMINOPHEN 1 TABLET: 7.5; 325 TABLET ORAL at 17:07

## 2025-01-06 RX ADMIN — Medication 10 ML: at 20:22

## 2025-01-06 RX ADMIN — ALBUTEROL SULFATE 2.5 MG: 2.5 SOLUTION RESPIRATORY (INHALATION) at 19:23

## 2025-01-06 RX ADMIN — GUAIFENESIN AND DEXTROMETHORPHAN 5 ML: 100; 10 SYRUP ORAL at 20:22

## 2025-01-06 RX ADMIN — METOPROLOL SUCCINATE 50 MG: 50 TABLET, EXTENDED RELEASE ORAL at 09:08

## 2025-01-06 RX ADMIN — GUAIFENESIN AND DEXTROMETHORPHAN 5 ML: 100; 10 SYRUP ORAL at 09:08

## 2025-01-06 RX ADMIN — ARFORMOTEROL TARTRATE 15 MCG: 15 SOLUTION RESPIRATORY (INHALATION) at 06:38

## 2025-01-06 RX ADMIN — HYDROCODONE BITARTRATE AND ACETAMINOPHEN 1 TABLET: 7.5; 325 TABLET ORAL at 04:11

## 2025-01-06 RX ADMIN — ALBUTEROL SULFATE 2.5 MG: 2.5 SOLUTION RESPIRATORY (INHALATION) at 01:02

## 2025-01-06 RX ADMIN — ACETYLCYSTEINE 3 ML: 200 SOLUTION ORAL; RESPIRATORY (INHALATION) at 14:14

## 2025-01-06 RX ADMIN — DOXYCYCLINE 100 MG: 100 CAPSULE ORAL at 20:22

## 2025-01-06 RX ADMIN — BUDESONIDE 0.5 MG: 0.5 INHALANT RESPIRATORY (INHALATION) at 06:38

## 2025-01-06 RX ADMIN — ASPIRIN 81 MG: 81 TABLET, CHEWABLE ORAL at 09:08

## 2025-01-06 RX ADMIN — HYDROCODONE BITARTRATE AND ACETAMINOPHEN 1 TABLET: 7.5; 325 TABLET ORAL at 23:29

## 2025-01-06 RX ADMIN — BUDESONIDE 0.5 MG: 0.5 INHALANT RESPIRATORY (INHALATION) at 19:24

## 2025-01-06 NOTE — PROGRESS NOTES
Respiratory Therapist Broncho-Pulmonary Hygiene Progress Note      Patient Name:  Radames Quinteros  YOB: 1960    Radames Quinteros meets the qualification for Level 3 of the Bronco-Pulmonary Hygiene Protocol. This was based on my daily patient assessment and includes review of chest x-ray results, cough ability and quality, oxygenation, secretions or risk for secretion development and patient mobility.     Broncho-Pulmonary Hygiene Assessment:    Level of Movement: Actively changing positions without assistance  Alert/ oriented/ cooperative    Breath Sounds: Bilateral localized decreased air exchange and/or coarse rhonchi    Cough: Strong, effective    Chest X-Ray: Possible signs of consolidation and/or atelectasis or clear.     Sputum Productions: None or small amount of thin or watery secretions with effective cough    History and Physical: Chronic condition    SpO2 to Oxygen Need: greater than 92% on room air or  less than 3L nasal canula    Current SpO2 is: 94 on 2 LPM    Based on this information, I have completed the following interventions: MetaNeb       Electronically signed by Brenda Mortensen RRT, 01/06/25, 4:04 PM EST.

## 2025-01-06 NOTE — PROGRESS NOTES
[facility]   Hospitalist Progress Note    Date of admission: 1/2/2025  Patient Name: Radames Quinteros  1960  Date: 1/6/2025      Subjective     Chief Complaint   Patient presents with    Shortness of Breath       Interval Followup: felt like breathing worsened this am.  Remains on lower o2 but more winded with exertion.  Is nwo willing to try using cpap at night.  Denies fevers.  Abd binder is helping    Objective     Vitals:   Temp:  [97.5 °F (36.4 °C)-98.6 °F (37 °C)] 97.6 °F (36.4 °C)  Heart Rate:  [65-85] 67  Resp:  [18-22] 20  BP: (110-169)/(63-96) 148/93  Flow (L/min) (Oxygen Therapy):  [0.5-2] 2    Physical Exam  Awake conversant in bed morbidly obese, more tired appearing today  B/l wheezing, moderate aeration, breathing more labored appearing  RRR no pitting edema today  Obese abdomen, binder in place, no significant pain    Result Review:  Vital signs, labs and recent relevant imaging reviewed.      CBC          1/4/2025    04:31 1/5/2025    04:28 1/6/2025    04:24   CBC   WBC 11.23  10.28  8.26    RBC 5.13  4.97  5.04    Hemoglobin 15.1  14.4  14.8    Hematocrit 47.2  45.2  46.1    MCV 92.0  90.9  91.5    MCH 29.4  29.0  29.4    MCHC 32.0  31.9  32.1    RDW 14.0  14.2  14.1    Platelets 219  196  204      CMP          1/4/2025    04:31 1/5/2025    04:28 1/6/2025    06:09   CMP   Glucose 139  128  102    BUN 34  40  33    Creatinine 1.37  1.35  1.26    EGFR 57.6  58.6  63.7    Sodium 139  140  140    Potassium 4.3  3.8  4.6    Chloride 104  101  101    Calcium 8.8  8.9  9.0    BUN/Creatinine Ratio 24.8  29.6  26.2    Anion Gap 12.1  13.5  10.3          acetaminophen    albuterol    aluminum-magnesium hydroxide-simethicone    senna-docusate sodium **AND** polyethylene glycol **AND** bisacodyl **AND** bisacodyl    Diclofenac Sodium    HYDROcodone-acetaminophen    hydrOXYzine    influenza vaccine    Lidocaine    melatonin    nicotine    nitroglycerin    ondansetron    prochlorperazine    sodium  chloride    sodium chloride    sodium chloride    acetylcysteine, 3 mL, Nebulization, TID - RT  albuterol, 2.5 mg, Nebulization, Q6H - RT  arformoterol, 15 mcg, Nebulization, BID - RT  aspirin, 81 mg, Oral, Daily  budesonide, 0.5 mg, Nebulization, BID - RT  [Held by provider] celecoxib, 200 mg, Oral, Daily  doxycycline, 100 mg, Oral, Q12H  famotidine, 20 mg, Oral, BID AC  guaiFENesin-dextromethorphan, 5 mL, Oral, 4x Daily  [Held by provider] losartan, 100 mg, Oral, Q24H  metoprolol succinate XL, 50 mg, Oral, Daily  polyethylene glycol, 17 g, Oral, BID  predniSONE, 40 mg, Oral, Daily With Breakfast  pregabalin, 100 mg, Oral, BID  senna-docusate sodium, 1 tablet, Oral, BID  sodium chloride, 10 mL, Intravenous, Q12H        XR Abdomen KUB    Result Date: 1/4/2025  Impression: Moderate colonic stool without signs of obstruction. Electronically Signed: Vish Lindsey MD  1/4/2025 5:16 PM EST  Workstation ID: [id number]    CT Angiogram Chest Pulmonary Embolism    Result Date: 1/2/2025  No central (or proximal) pulmonary embolism. Otherwise, the study is not diagnostic for evaluation of more distal pulmonary emboli due to limitations of the contrast bolus. No thoracic aortic aneurysm or dissection. No acute infiltrate. Please see above comments for further detail.   Portions of this note were completed with a voice recognition program.  Electronically Signed By-Radames Riggs MD On:1/2/2025 11:56 PM      XR Chest 1 View    Result Date: 1/2/2025  Impression: No acute process. Electronically Signed: Aakash Rogers MD  1/2/2025 8:02 PM EST  Workstation ID: [id number]     Assessment / Plan     Summary: 64 y.o. with obesity, SHOBHA not tolerant of NIPPV, Hypertension who presented with 5 to 6 days of worsening shortness of air found to have RSV and suspected diastolic CHF exacerbation    Assessment/Plan (clinically significant if listed here)  Acute hypoxemic respiratory failure  RSV URI with bronchitis and wheezing  Diastolic CHF  exacerbation - EF 55% per 1/2025  Probable COPD (not formally diagnosed) w/ exacerbation  Left abdominal hernia  Hypertension  SHOBHA not compliant/does not use home NIPPV  Morbid obesity BMI 42.4  Former smoker quit in 2022, 44-pack-year hx    Echo EF 55%, diastolic dysfunction,  Cxr non acute infiltrate on admission    Resp status variable today, add mucomyst to try and augment airway clearance. Cont scheduled nebs/resp hygiene, guaifenasin  Start cpap trial tonight/with naps.  Encourage use at home as well  Monitor resp status if not improving with above changes may warrant pulm eval  Resume lasix po and monitor  Increase scheduled bowel regimen, has chronic constipation, hernia treating with abd binder helping  Continue prednisone, Brovana, Pulmicort, scheduled nebulizers  needs pulm follow-up and PFTs unclear if copd (suspected previously, needs pfts).  No acute infiltrate on CT PE,   continue Doxy 5-day course  Wean O2 as able, decreased to 1L today   Cont metoprolol, hold losartan monitor bp  Cont famotidine  Cont aspirin  PT/OT  Check a.m. CBC, BMP, magnesium, phosphorus  Continue hospitalization at current level of care, tele, given increased o2 / symptoms watching at least another day or two likely.      Dispo: wants to go home once medically stable.   D/w SW    VTE Prophylaxis:  Mechanical VTE prophylaxis orders are present.      Code Status (Patient has no pulse and is not breathing): CPR (Attempt to Resuscitate)  Medical Interventions (Patient has pulse or is breathing): Full Support

## 2025-01-06 NOTE — PROGRESS NOTES
Respiratory Therapist Broncho-Pulmonary Hygiene Progress Note      Patient Name:  Radames Quinteros  YOB: 1960    Radames Quinteros meets the qualification for Level 2 of the Bronco-Pulmonary Hygiene Protocol. This was based on my daily patient assessment and includes review of chest x-ray results, cough ability and quality, oxygenation, secretions or risk for secretion development and patient mobility.     Broncho-Pulmonary Hygiene Assessment:    Level of Movement: Actively changing positions without assistance  Alert/ oriented/ cooperative    Breath Sounds: Diminished and/or coarse rhonchi    Cough: Strong, effective and/or frequent    Chest X-Ray: Possible signs of consolidation and/or atelectasis or clear.     Sputum Productions: Able to produce small to moderate amount, of moderately thick secretions    History and Physical: New onset of bronchitis or mucus plugging    SpO2 to Oxygen Need: greater than 92% on room air or  less than 3L nasal canula    Current SpO2 is: 94 on .5L    Based on this information, I have completed the following interventions: MetaNeb       Electronically signed by Jordin Pham CRT, 01/06/25, 5:00 AM EST.

## 2025-01-06 NOTE — PLAN OF CARE
Goal Outcome Evaluation:  Plan of Care Reviewed With: patient           Outcome Evaluation: q6h pain meds. Pulse ox bedside. Frequent coughing spells.

## 2025-01-06 NOTE — PLAN OF CARE
Goal Outcome Evaluation:  Plan of Care Reviewed With: patient        Progress: no change  Outcome Evaluation: VSS - currently on 2 L NC. Pain treated x3 per patient request as ordered. Patient ambulating well around room ad imani. No acute changes or concerns from patient at this time, plan of care ongoing.

## 2025-01-07 LAB
ANION GAP SERPL CALCULATED.3IONS-SCNC: 11.4 MMOL/L (ref 5–15)
BACTERIA SPEC AEROBE CULT: NORMAL
BACTERIA SPEC AEROBE CULT: NORMAL
BASOPHILS # BLD AUTO: 0.02 10*3/MM3 (ref 0–0.2)
BASOPHILS NFR BLD AUTO: 0.2 % (ref 0–1.5)
BUN SERPL-MCNC: 38 MG/DL (ref 8–23)
BUN/CREAT SERPL: 30.4 (ref 7–25)
CALCIUM SPEC-SCNC: 9 MG/DL (ref 8.6–10.5)
CHLORIDE SERPL-SCNC: 100 MMOL/L (ref 98–107)
CO2 SERPL-SCNC: 26.6 MMOL/L (ref 22–29)
CREAT SERPL-MCNC: 1.25 MG/DL (ref 0.76–1.27)
DEPRECATED RDW RBC AUTO: 46.2 FL (ref 37–54)
EGFRCR SERPLBLD CKD-EPI 2021: 64.3 ML/MIN/1.73
EOSINOPHIL # BLD AUTO: 0.15 10*3/MM3 (ref 0–0.4)
EOSINOPHIL NFR BLD AUTO: 1.7 % (ref 0.3–6.2)
ERYTHROCYTE [DISTWIDTH] IN BLOOD BY AUTOMATED COUNT: 13.6 % (ref 12.3–15.4)
GLUCOSE SERPL-MCNC: 92 MG/DL (ref 65–99)
HCT VFR BLD AUTO: 44.8 % (ref 37.5–51)
HGB BLD-MCNC: 14.2 G/DL (ref 13–17.7)
IMM GRANULOCYTES # BLD AUTO: 0.03 10*3/MM3 (ref 0–0.05)
IMM GRANULOCYTES NFR BLD AUTO: 0.3 % (ref 0–0.5)
LYMPHOCYTES # BLD AUTO: 1.75 10*3/MM3 (ref 0.7–3.1)
LYMPHOCYTES NFR BLD AUTO: 19.8 % (ref 19.6–45.3)
MAGNESIUM SERPL-MCNC: 2 MG/DL (ref 1.6–2.4)
MCH RBC QN AUTO: 29.1 PG (ref 26.6–33)
MCHC RBC AUTO-ENTMCNC: 31.7 G/DL (ref 31.5–35.7)
MCV RBC AUTO: 91.8 FL (ref 79–97)
MONOCYTES # BLD AUTO: 0.75 10*3/MM3 (ref 0.1–0.9)
MONOCYTES NFR BLD AUTO: 8.5 % (ref 5–12)
NEUTROPHILS NFR BLD AUTO: 6.16 10*3/MM3 (ref 1.7–7)
NEUTROPHILS NFR BLD AUTO: 69.5 % (ref 42.7–76)
NRBC BLD AUTO-RTO: 0 /100 WBC (ref 0–0.2)
PHOSPHATE SERPL-MCNC: 3.3 MG/DL (ref 2.5–4.5)
PLATELET # BLD AUTO: 203 10*3/MM3 (ref 140–450)
PMV BLD AUTO: 11.2 FL (ref 6–12)
POTASSIUM SERPL-SCNC: 4 MMOL/L (ref 3.5–5.2)
RBC # BLD AUTO: 4.88 10*6/MM3 (ref 4.14–5.8)
SODIUM SERPL-SCNC: 138 MMOL/L (ref 136–145)
WBC NRBC COR # BLD AUTO: 8.86 10*3/MM3 (ref 3.4–10.8)

## 2025-01-07 PROCEDURE — 99232 SBSQ HOSP IP/OBS MODERATE 35: CPT | Performed by: INTERNAL MEDICINE

## 2025-01-07 PROCEDURE — 94660 CPAP INITIATION&MGMT: CPT

## 2025-01-07 PROCEDURE — 94761 N-INVAS EAR/PLS OXIMETRY MLT: CPT

## 2025-01-07 PROCEDURE — 94799 UNLISTED PULMONARY SVC/PX: CPT

## 2025-01-07 PROCEDURE — 84100 ASSAY OF PHOSPHORUS: CPT | Performed by: INTERNAL MEDICINE

## 2025-01-07 PROCEDURE — 94664 DEMO&/EVAL PT USE INHALER: CPT

## 2025-01-07 PROCEDURE — 85025 COMPLETE CBC W/AUTO DIFF WBC: CPT | Performed by: INTERNAL MEDICINE

## 2025-01-07 PROCEDURE — 80048 BASIC METABOLIC PNL TOTAL CA: CPT | Performed by: INTERNAL MEDICINE

## 2025-01-07 PROCEDURE — 83735 ASSAY OF MAGNESIUM: CPT | Performed by: INTERNAL MEDICINE

## 2025-01-07 PROCEDURE — 63710000001 PREDNISONE PER 1 MG: Performed by: INTERNAL MEDICINE

## 2025-01-07 RX ORDER — LOSARTAN POTASSIUM 25 MG/1
25 TABLET ORAL DAILY
Status: DISCONTINUED | OUTPATIENT
Start: 2025-01-07 | End: 2025-01-09 | Stop reason: HOSPADM

## 2025-01-07 RX ORDER — AMLODIPINE BESYLATE 5 MG/1
5 TABLET ORAL DAILY
Status: DISCONTINUED | OUTPATIENT
Start: 2025-01-07 | End: 2025-01-07

## 2025-01-07 RX ADMIN — HYDROCODONE BITARTRATE AND ACETAMINOPHEN 1 TABLET: 7.5; 325 TABLET ORAL at 23:47

## 2025-01-07 RX ADMIN — ARFORMOTEROL TARTRATE 15 MCG: 15 SOLUTION RESPIRATORY (INHALATION) at 06:19

## 2025-01-07 RX ADMIN — FAMOTIDINE 20 MG: 20 TABLET, FILM COATED ORAL at 17:51

## 2025-01-07 RX ADMIN — ALBUTEROL SULFATE 2.5 MG: 2.5 SOLUTION RESPIRATORY (INHALATION) at 01:41

## 2025-01-07 RX ADMIN — ACETYLCYSTEINE 3 ML: 200 SOLUTION ORAL; RESPIRATORY (INHALATION) at 11:30

## 2025-01-07 RX ADMIN — LACTULOSE 10 G: 10 SOLUTION ORAL at 08:40

## 2025-01-07 RX ADMIN — FAMOTIDINE 20 MG: 20 TABLET, FILM COATED ORAL at 08:38

## 2025-01-07 RX ADMIN — PREDNISONE 40 MG: 20 TABLET ORAL at 08:39

## 2025-01-07 RX ADMIN — HYDROCODONE BITARTRATE AND ACETAMINOPHEN 1 TABLET: 7.5; 325 TABLET ORAL at 17:54

## 2025-01-07 RX ADMIN — PREGABALIN 100 MG: 100 CAPSULE ORAL at 20:35

## 2025-01-07 RX ADMIN — BUDESONIDE 0.5 MG: 0.5 INHALANT RESPIRATORY (INHALATION) at 18:27

## 2025-01-07 RX ADMIN — ALBUTEROL SULFATE 2.5 MG: 2.5 SOLUTION RESPIRATORY (INHALATION) at 06:19

## 2025-01-07 RX ADMIN — METOPROLOL SUCCINATE 50 MG: 50 TABLET, EXTENDED RELEASE ORAL at 08:39

## 2025-01-07 RX ADMIN — Medication 10 ML: at 08:42

## 2025-01-07 RX ADMIN — ALBUTEROL SULFATE 2.5 MG: 2.5 SOLUTION RESPIRATORY (INHALATION) at 18:27

## 2025-01-07 RX ADMIN — Medication 10 ML: at 20:35

## 2025-01-07 RX ADMIN — ALBUTEROL SULFATE 2.5 MG: 2.5 SOLUTION RESPIRATORY (INHALATION) at 11:30

## 2025-01-07 RX ADMIN — GUAIFENESIN AND DEXTROMETHORPHAN 5 ML: 100; 10 SYRUP ORAL at 08:38

## 2025-01-07 RX ADMIN — GUAIFENESIN AND DEXTROMETHORPHAN 5 ML: 100; 10 SYRUP ORAL at 17:51

## 2025-01-07 RX ADMIN — BUDESONIDE 0.5 MG: 0.5 INHALANT RESPIRATORY (INHALATION) at 06:19

## 2025-01-07 RX ADMIN — GUAIFENESIN AND DEXTROMETHORPHAN 5 ML: 100; 10 SYRUP ORAL at 12:38

## 2025-01-07 RX ADMIN — HYDROCODONE BITARTRATE AND ACETAMINOPHEN 1 TABLET: 7.5; 325 TABLET ORAL at 05:28

## 2025-01-07 RX ADMIN — ACETYLCYSTEINE 3 ML: 200 SOLUTION ORAL; RESPIRATORY (INHALATION) at 18:27

## 2025-01-07 RX ADMIN — DOXYCYCLINE 100 MG: 100 CAPSULE ORAL at 08:39

## 2025-01-07 RX ADMIN — PREGABALIN 100 MG: 100 CAPSULE ORAL at 08:39

## 2025-01-07 RX ADMIN — ACETYLCYSTEINE 3 ML: 200 SOLUTION ORAL; RESPIRATORY (INHALATION) at 06:19

## 2025-01-07 RX ADMIN — GUAIFENESIN AND DEXTROMETHORPHAN 5 ML: 100; 10 SYRUP ORAL at 20:35

## 2025-01-07 RX ADMIN — LOSARTAN POTASSIUM 25 MG: 25 TABLET, FILM COATED ORAL at 08:52

## 2025-01-07 RX ADMIN — ARFORMOTEROL TARTRATE 15 MCG: 15 SOLUTION RESPIRATORY (INHALATION) at 18:27

## 2025-01-07 RX ADMIN — ASPIRIN 81 MG: 81 TABLET, CHEWABLE ORAL at 08:39

## 2025-01-07 RX ADMIN — ALBUTEROL SULFATE 2.5 MG: 2.5 SOLUTION RESPIRATORY (INHALATION) at 23:20

## 2025-01-07 NOTE — PLAN OF CARE
Goal Outcome Evaluation:  Plan of Care Reviewed With: patient        Progress: improving  Outcome Evaluation: VSS. No acute changes overnight, medicated for c/o pain, see MAR. Call light within reach.

## 2025-01-07 NOTE — PROGRESS NOTES
RT EQUIPMENT DEVICE RELATED - SKIN ASSESSMENT    RT Medical Equipment/Device:     NIV Mask:  Full-face    size: M    Skin Assessment:      CHEEKS, NOSE, LIPS:  Intact    Device Skin Pressure Protection:  Pressure points protected    Nurse Notification:  No    Darin Ferreira, RRT

## 2025-01-07 NOTE — PROGRESS NOTES
[facility]   Hospitalist Progress Note  Date: 2025  Patient Name: Radames Quinteros  : 1960  MRN: [id number]  Date of admission: 2025    Subjective   Subjective     Chief Complaint: Shortness of breath    Summary:   Radames Quinteros is a 64 y.o. male with obesity (BMI: 40), SHOBHA not tolerant of NIPPV, essential pretension presenting 5 to 6 days of worsening shortness of breath and found to have RSV and diastolic heart failure exacerbation.  Patient also with probable undiagnosed COPD, exacerbation treated.  Treatment with nebulizer breathing treatments and steroids started.    Interval Followup:   No acute events overnight.  Patient still with cough.  Patient bringing up mucus since the Mucomyst was added.  Nursing with no additional acute issues to report.    Pain Medication:   -Norco    Objective   Objective     Vitals:   Temp:  [97.2 °F (36.2 °C)-98.1 °F (36.7 °C)] 97.3 °F (36.3 °C)  Heart Rate:  [61-76] 76  Resp:  [16-20] 16  BP: (125-153)/(62-88) 125/62  Flow (L/min) (Oxygen Therapy):  [2] 2  Physical Exam   Gen: No acute distress, sitting up on edge of bed  Resp: Wheezing bilaterally, normal respiratory effort  Card: RRR, No m/r/g  Abd: Soft, Nontender, Nondistended, + bowel sounds    Result Review    Result Review:  I have personally reviewed the results as below and agree with these findings:  []  Laboratory:   CMP          2025    04:28 2025    06:09 2025    04:32   CMP   Glucose 128  102  92    BUN 40  33  38    Creatinine 1.35  1.26  1.25    EGFR 58.6  63.7  64.3    Sodium 140  140  138    Potassium 3.8  4.6  4.0    Chloride 101  101  100    Calcium 8.9  9.0  9.0    BUN/Creatinine Ratio 29.6  26.2  30.4    Anion Gap 13.5  10.3  11.4      CBC          2025    04:28 2025    04:24 2025    04:32   CBC   WBC 10.28  8.26  8.86    RBC 4.97  5.04  4.88    Hemoglobin 14.4  14.8  14.2    Hematocrit 45.2  46.1  44.8    MCV 90.9  91.5  91.8    MCH 29.0  29.4  29.1     MCHC 31.9  32.1  31.7    RDW 14.2  14.1  13.6    Platelets 196  204  203    Phosphorus and magnesium within the limits  [x]  Microbiology: Blood culture (01/02/2025): No growth to date  []  Radiology:   [x]  EKG/Telemetry:    []  Cardiology/Vascular:    []  Pathology:  []  Old records:  []  Other:    Assessment & Plan   Assessment / Plan     Assessment:  Acute hypoxic respiratory failure  RSV with bronchitis and wheezing  Acute on chronic diastolic heart failure  Probable COPD with exacerbation  Left abdominal hernia  Essential hypertension  SHOBHA noncompliant with home NIPPV  Obesity (BMI: 40)  History of tobacco abuse, quit smoking 2022    Plan:  -RT  consulted  -Continue oral Lasix  -Continue Brovana, Pulmicort  -Continue prednisone 40 mg daily  -Complete 5-day course of doxycycline  -Continue Mucomyst  -RT case management consulted  -Continue metoprolol  -Start losartan  -Continue famotidine  -Continue aspirin  -PT/OT consulted  -Will monitor electrolytes and renal function with BMP and magnesium level in the AM  -Will monitor WBC and Hgb with CBC in the AM  -Clinical course will dictate further management     DVT Prophylaxis: SCDs  GI Prophylaxis: Famotidine  Diet:   Diet Order   Procedures    Diet: Cardiac; Healthy Heart (2-3 Na+); Fluid Consistency: Thin (IDDSI 0)     Dispo: PT/OT consulted     Time spent personally reviewing patient's chart, labs and imaging, evaluating/examining the patient, discussing care plan with patient and nurse at bedside: 51 minutes.     Part of this note may be an electronic transcription/translation of spoken language to printed text using the Dragon dictation system.    VTE Prophylaxis:  Mechanical VTE prophylaxis orders are present.        CODE STATUS:   Code Status (Patient has no pulse and is not breathing): CPR (Attempt to Resuscitate)  Medical Interventions (Patient has pulse or is breathing): Full Support        Electronically signed by Tr Hester MD,  1/7/2025, 16:22 EST.

## 2025-01-07 NOTE — PLAN OF CARE
Goal Outcome Evaluation:  Plan of Care Reviewed With: patient        Progress: no change  Outcome Evaluation: Patient wore cpap all night with setting of cpap 8 and 28%; this am patient is on a 2L nasal cannula and tolerating well; patient on continuous pulse oximeter

## 2025-01-07 NOTE — PROGRESS NOTES
RT EQUIPMENT DEVICE RELATED - SKIN ASSESSMENT    RT Medical Equipment/Device:     NIV Mask:  Under-the-nose   size:       Skin Assessment:      Cheek:  Intact  Chin:  Intact  Neck:  Intact  Nose:  Intact    Device Skin Pressure Protection:  Pressure points protected    Nurse Notification:  Pepper Good, RRT

## 2025-01-07 NOTE — PLAN OF CARE
Goal Outcome Evaluation:  Plan of Care Reviewed With: patient        Progress: improving  Outcome Evaluation: Patient has been wearing CPAP of 8 since 4 AM. Patient is tolerating device very well and maintaining SpO2 in mid 90s.

## 2025-01-07 NOTE — PROGRESS NOTES
RT EQUIPMENT DEVICE RELATED - SKIN ASSESSMENT    RT Medical Equipment/Device:     NIV Mask:  Full-face    size: m    Skin Assessment:      Cheek:  Intact  Chin:  Intact  Nose:  Intact  Mouth:  Intact    Device Skin Pressure Protection:  Pressure points protected    Nurse Notification:  Pepper Pham, CRT

## 2025-01-08 LAB
ANION GAP SERPL CALCULATED.3IONS-SCNC: 8.4 MMOL/L (ref 5–15)
BUN SERPL-MCNC: 35 MG/DL (ref 8–23)
BUN/CREAT SERPL: 32.4 (ref 7–25)
CALCIUM SPEC-SCNC: 8.8 MG/DL (ref 8.6–10.5)
CHLORIDE SERPL-SCNC: 102 MMOL/L (ref 98–107)
CO2 SERPL-SCNC: 25.6 MMOL/L (ref 22–29)
CREAT SERPL-MCNC: 1.08 MG/DL (ref 0.76–1.27)
DEPRECATED RDW RBC AUTO: 45.8 FL (ref 37–54)
EGFRCR SERPLBLD CKD-EPI 2021: 76.6 ML/MIN/1.73
ERYTHROCYTE [DISTWIDTH] IN BLOOD BY AUTOMATED COUNT: 13.7 % (ref 12.3–15.4)
GLUCOSE SERPL-MCNC: 97 MG/DL (ref 65–99)
HCT VFR BLD AUTO: 42.7 % (ref 37.5–51)
HGB BLD-MCNC: 13.8 G/DL (ref 13–17.7)
MAGNESIUM SERPL-MCNC: 1.9 MG/DL (ref 1.6–2.4)
MCH RBC QN AUTO: 29.4 PG (ref 26.6–33)
MCHC RBC AUTO-ENTMCNC: 32.3 G/DL (ref 31.5–35.7)
MCV RBC AUTO: 90.9 FL (ref 79–97)
PLATELET # BLD AUTO: 216 10*3/MM3 (ref 140–450)
PMV BLD AUTO: 11.6 FL (ref 6–12)
POTASSIUM SERPL-SCNC: 4.2 MMOL/L (ref 3.5–5.2)
RBC # BLD AUTO: 4.7 10*6/MM3 (ref 4.14–5.8)
SODIUM SERPL-SCNC: 136 MMOL/L (ref 136–145)
WBC NRBC COR # BLD AUTO: 9.46 10*3/MM3 (ref 3.4–10.8)

## 2025-01-08 PROCEDURE — 85027 COMPLETE CBC AUTOMATED: CPT | Performed by: INTERNAL MEDICINE

## 2025-01-08 PROCEDURE — 63710000001 PREDNISONE PER 1 MG: Performed by: INTERNAL MEDICINE

## 2025-01-08 PROCEDURE — 80048 BASIC METABOLIC PNL TOTAL CA: CPT | Performed by: INTERNAL MEDICINE

## 2025-01-08 PROCEDURE — 83735 ASSAY OF MAGNESIUM: CPT | Performed by: INTERNAL MEDICINE

## 2025-01-08 PROCEDURE — 94761 N-INVAS EAR/PLS OXIMETRY MLT: CPT

## 2025-01-08 PROCEDURE — 94799 UNLISTED PULMONARY SVC/PX: CPT

## 2025-01-08 PROCEDURE — 99232 SBSQ HOSP IP/OBS MODERATE 35: CPT | Performed by: INTERNAL MEDICINE

## 2025-01-08 PROCEDURE — 94664 DEMO&/EVAL PT USE INHALER: CPT

## 2025-01-08 RX ORDER — BUMETANIDE 1 MG/1
2 TABLET ORAL DAILY
Status: DISCONTINUED | OUTPATIENT
Start: 2025-01-08 | End: 2025-01-09 | Stop reason: HOSPADM

## 2025-01-08 RX ADMIN — GUAIFENESIN AND DEXTROMETHORPHAN 5 ML: 100; 10 SYRUP ORAL at 12:30

## 2025-01-08 RX ADMIN — LOSARTAN POTASSIUM 25 MG: 25 TABLET, FILM COATED ORAL at 08:20

## 2025-01-08 RX ADMIN — BUDESONIDE 0.5 MG: 0.5 INHALANT RESPIRATORY (INHALATION) at 19:23

## 2025-01-08 RX ADMIN — LACTULOSE 10 G: 10 SOLUTION ORAL at 08:19

## 2025-01-08 RX ADMIN — BUDESONIDE 0.5 MG: 0.5 INHALANT RESPIRATORY (INHALATION) at 07:28

## 2025-01-08 RX ADMIN — ACETYLCYSTEINE 3 ML: 200 SOLUTION ORAL; RESPIRATORY (INHALATION) at 19:23

## 2025-01-08 RX ADMIN — FAMOTIDINE 20 MG: 20 TABLET, FILM COATED ORAL at 08:20

## 2025-01-08 RX ADMIN — HYDROCODONE BITARTRATE AND ACETAMINOPHEN 1 TABLET: 7.5; 325 TABLET ORAL at 18:21

## 2025-01-08 RX ADMIN — GUAIFENESIN AND DEXTROMETHORPHAN 5 ML: 100; 10 SYRUP ORAL at 08:19

## 2025-01-08 RX ADMIN — HYDROCODONE BITARTRATE AND ACETAMINOPHEN 1 TABLET: 7.5; 325 TABLET ORAL at 06:12

## 2025-01-08 RX ADMIN — PREGABALIN 100 MG: 100 CAPSULE ORAL at 21:48

## 2025-01-08 RX ADMIN — ASPIRIN 81 MG: 81 TABLET, CHEWABLE ORAL at 08:20

## 2025-01-08 RX ADMIN — BUMETANIDE 2 MG: 1 TABLET ORAL at 11:04

## 2025-01-08 RX ADMIN — ACETYLCYSTEINE 3 ML: 200 SOLUTION ORAL; RESPIRATORY (INHALATION) at 15:23

## 2025-01-08 RX ADMIN — FAMOTIDINE 20 MG: 20 TABLET, FILM COATED ORAL at 18:21

## 2025-01-08 RX ADMIN — ARFORMOTEROL TARTRATE 15 MCG: 15 SOLUTION RESPIRATORY (INHALATION) at 19:23

## 2025-01-08 RX ADMIN — ALBUTEROL SULFATE 2.5 MG: 2.5 SOLUTION RESPIRATORY (INHALATION) at 19:23

## 2025-01-08 RX ADMIN — ARFORMOTEROL TARTRATE 15 MCG: 15 SOLUTION RESPIRATORY (INHALATION) at 07:28

## 2025-01-08 RX ADMIN — ACETYLCYSTEINE 3 ML: 200 SOLUTION ORAL; RESPIRATORY (INHALATION) at 07:28

## 2025-01-08 RX ADMIN — ALBUTEROL SULFATE 2.5 MG: 2.5 SOLUTION RESPIRATORY (INHALATION) at 07:28

## 2025-01-08 RX ADMIN — PREDNISONE 40 MG: 20 TABLET ORAL at 08:20

## 2025-01-08 RX ADMIN — Medication 10 ML: at 21:47

## 2025-01-08 RX ADMIN — GUAIFENESIN AND DEXTROMETHORPHAN 5 ML: 100; 10 SYRUP ORAL at 18:21

## 2025-01-08 RX ADMIN — SENNOSIDES AND DOCUSATE SODIUM 1 TABLET: 50; 8.6 TABLET ORAL at 21:48

## 2025-01-08 RX ADMIN — POLYETHYLENE GLYCOL 3350 17 G: 17 POWDER, FOR SOLUTION ORAL at 21:47

## 2025-01-08 RX ADMIN — PREGABALIN 100 MG: 100 CAPSULE ORAL at 08:20

## 2025-01-08 RX ADMIN — Medication 10 ML: at 08:21

## 2025-01-08 RX ADMIN — HYDROCODONE BITARTRATE AND ACETAMINOPHEN 1 TABLET: 7.5; 325 TABLET ORAL at 12:30

## 2025-01-08 RX ADMIN — METOPROLOL SUCCINATE 50 MG: 50 TABLET, EXTENDED RELEASE ORAL at 08:20

## 2025-01-08 RX ADMIN — ALBUTEROL SULFATE 2.5 MG: 2.5 SOLUTION RESPIRATORY (INHALATION) at 13:17

## 2025-01-08 RX ADMIN — GUAIFENESIN AND DEXTROMETHORPHAN 5 ML: 100; 10 SYRUP ORAL at 21:48

## 2025-01-08 NOTE — PROGRESS NOTES
[facility]   Hospitalist Progress Note  Date: 2025  Patient Name: Radames Quinteros  : 1960  MRN: [id number]  Date of admission: 2025    Subjective   Subjective     Chief Complaint: Shortness of breath    Summary:   Radames Quinteros is a 64 y.o. male with obesity (BMI: 40), SHOBHA not tolerant of NIPPV, essential hypertension presented with 5 to 6 days of worsening shortness of breath and found to have RSV and diastolic heart failure exacerbation.  Patient also with probable undiagnosed COPD, exacerbation treated.  Diuresis, nebulizer breathing treatments and steroid therapy started, there was improvement in patient's respiratory status noted.    Interval Followup:   No acute issues overnight.  Patient says that his breathing is improving.  Still with cough but says it is improved.  Bringing up mucus.  Denied any chest pain.  Nursing with no additional acute issues to report.    Pain Medication:   -Norco    Objective   Objective     Vitals:   Temp:  [97.3 °F (36.3 °C)-98.2 °F (36.8 °C)] 97.3 °F (36.3 °C)  Heart Rate:  [56-76] 63  Resp:  [16-20] 18  BP: (125-168)/(62-96) 146/80  Flow (L/min) (Oxygen Therapy):  [1.5-2] 1.5  Physical Exam   Gen: No acute distress, sitting up at edge of bed, conversant, pleasant  Resp: Decent aeration, equal chest rise bilaterally, minimal wheezing noted  Card: RRR, No m/r/g  Abd: Soft, Nontender, Nondistended, + bowel sounds    Result Review    Result Review:  I have personally reviewed the results as below and agree with these findings:  []  Laboratory:   CMP          2025    06:09 2025    04:32 2025    04:35   CMP   Glucose 102  92  97    BUN 33  38  35    Creatinine 1.26  1.25  1.08    EGFR 63.7  64.3  76.6    Sodium 140  138  136    Potassium 4.6  4.0  4.2    Chloride 101  100  102    Calcium 9.0  9.0  8.8    BUN/Creatinine Ratio 26.2  30.4  32.4    Anion Gap 10.3  11.4  8.4      CBC          2025    04:24 2025    04:32 2025    04:35    CBC   WBC 8.26  8.86  9.46    RBC 5.04  4.88  4.70    Hemoglobin 14.8  14.2  13.8    Hematocrit 46.1  44.8  42.7    MCV 91.5  91.8  90.9    MCH 29.4  29.1  29.4    MCHC 32.1  31.7  32.3    RDW 14.1  13.6  13.7    Platelets 204  203  216    Magnesium within normal limits  [x]  Microbiology: Blood culture (01/02/2025): No growth to date  []  Radiology:   [x]  EKG/Telemetry:    []  Cardiology/Vascular:    []  Pathology:  []  Old records:  []  Other:    Assessment & Plan   Assessment / Plan     Assessment:  Acute hypoxic respiratory failure  RSV with bronchitis and wheezing  Acute on chronic diastolic heart failure  Probable COPD with exacerbation  Left abdominal hernia  Essential hypertension  SHOBHA noncompliant with home NIPPV  Obesity (BMI: 40)  History of tobacco abuse, quit smoking 2022    Plan:  -RT  consulted  -Resume home Bumex  -Continue Brovana, Pulmicort  -Completing daily prednisone today  -Completed 5-day course of doxycycline  -Continue Mucomyst  -Continue metoprolol and losartan  -Continue famotidine  -Continue aspirin  -Plan to obtain a room air ABG tomorrow and perform 6-minute walk test as well to see if patient qualifies for home O2  -Monitor electrolytes and renal function with BMP and magnesium level in the AM  -Monitor WBC and Hgb with CBC in the AM  -Clinical course will dictate further management     DVT Prophylaxis: SCDs  GI Prophylaxis: Famotidine  Diet:   Diet Order   Procedures    Diet: Cardiac; Healthy Heart (2-3 Na+); Fluid Consistency: Thin (IDDSI 0)     Dispo: Home when medically approved for discharge     Personally reviewed patients labs and imaging, discussed with patient and nurse at bedside.     Part of this note may be an electronic transcription/translation of spoken language to printed text using the Dragon dictation system.    VTE Prophylaxis:  Mechanical VTE prophylaxis orders are present.        CODE STATUS:   Code Status (Patient has no pulse and is not breathing):  CPR (Attempt to Resuscitate)  Medical Interventions (Patient has pulse or is breathing): Full Support        Electronically signed by Tr Hester MD, 1/8/2025, 09:48 EST.

## 2025-01-08 NOTE — PLAN OF CARE
Goal Outcome Evaluation:  Plan of Care Reviewed With: patient        Progress: no change  Outcome Evaluation: Pt refused bipap last night. V60 on standby. Pt currently on a 1.5L nasal cannula, resting at this time.

## 2025-01-08 NOTE — PLAN OF CARE
Goal Outcome Evaluation:              Outcome Evaluation: No acute events this shift. Breathing treatments have been helping. 1.5L NC resting in bed watching tv. Call light in reach

## 2025-01-08 NOTE — CONSULTS
"Nutrition Services    Patient Name: Radames Quinteros  YOB: 1960  MRN: [id number]  Admission date: 1/2/2025      CLINICAL NUTRITION ASSESSMENT      Reason for Assessment  LOS     H&P:  Past Medical History:   Diagnosis Date    Congestive heart failure     Hypertension     Injury of back     Sleep apnea         Current Problems:   Active Hospital Problems    Diagnosis     **SOB (shortness of breath)         Nutrition/Diet History         Narrative     Patient assessed by RD for LOS. Patient is at low risk per nutrition risk screening (NRS-2002).   Pt reports good appetite, 100% intake at most meals. Tolerates Heart healthy restriction. No GI complaints. +BM, 1/7.  No acute nutrition concerns or interventions at this time. RD to follow per protocol     Anthropometrics        Current Height, Weight Height: 157.5 cm (62\")  Weight: 104 kg (229 lb 8 oz)   Current BMI Body mass index is 41.98 kg/m².   BMI Classification Obese Class III   % %, IBW 53.6 kg   Adjusted Body Weight (ABW)    Weight Hx  Wt Readings from Last 30 Encounters:   01/08/25 0619 104 kg (229 lb 8 oz)   01/07/25 0528 101 kg (223 lb 12.3 oz)   01/06/25 0500 103 kg (226 lb 13.7 oz)   01/05/25 0435 104 kg (228 lb 2.8 oz)   01/04/25 0544 102 kg (225 lb 8.5 oz)   01/03/25 0016 105 kg (231 lb 14.8 oz)   01/02/25 1805 106 kg (233 lb 0.4 oz)   10/02/24 0904 103 kg (227 lb)   09/17/24 1208 103 kg (227 lb 15.3 oz)   09/16/24 1013 107 kg (235 lb 14.3 oz)   06/12/24 1408 107 kg (235 lb)   05/31/24 1917 102 kg (224 lb)   10/20/23 1238 101 kg (223 lb 8.7 oz)   03/20/23 1948 103 kg (226 lb 6.6 oz)   07/11/22 1751 107 kg (236 lb 5.3 oz)   06/26/22 1830 109 kg (240 lb)          Wt Change Observation Wt stable     Estimated/Assessed Needs  Estimated Needs based on: Ideal Body Weight 54 kg       Energy Requirements 30-35 kcal/kg    EST Needs (kcal/day) 5203-4567 kcal       Protein Requirements 2-2.5 g/kg   EST Daily Needs (g/day) 108-135 g     "   Fluid Requirements 1 ml/kcal    Estimated Needs (mL/day) 1324-3335 mL     Labs/Medications         Pertinent Labs Reviewed.   Results from last 7 days   Lab Units 01/08/25  0435 01/07/25  0432 01/06/25  0609 01/03/25  0410 01/02/25  1823   SODIUM mmol/L 136 138 140   < > 136   POTASSIUM mmol/L 4.2 4.0 4.6   < > 4.9   CHLORIDE mmol/L 102 100 101   < > 102   CO2 mmol/L 25.6 26.6 28.7   < > 19.7*   BUN mg/dL 35* 38* 33*   < > 18   CREATININE mg/dL 1.08 1.25 1.26   < > 1.24   CALCIUM mg/dL 8.8 9.0 9.0   < > 9.0   BILIRUBIN mg/dL  --   --   --   --  0.8   ALK PHOS U/L  --   --   --   --  94   ALT (SGPT) U/L  --   --   --   --  21   AST (SGOT) U/L  --   --   --   --  30   GLUCOSE mg/dL 97 92 102*   < > 126*    < > = values in this interval not displayed.     Results from last 7 days   Lab Units 01/08/25  0435 01/07/25  0432 01/06/25  0609   MAGNESIUM mg/dL 1.9 2.0 1.9   PHOSPHORUS mg/dL  --  3.3 3.2   HEMOGLOBIN g/dL 13.8 14.2  --    HEMATOCRIT % 42.7 44.8  --      COVID19   Date Value Ref Range Status   01/02/2025 Not Detected Not Detected - Ref. Range Final     Lab Results   Component Value Date    HGBA1C 6.4 (H) 06/22/2024         Pertinent Medications Reviewed.     Malnutrition Severity Assessment        Nutrition Diagnosis         Nutrition Dx Problem 1 No nutrition diagnosis at this time.       Nutrition Intervention        Current Nutrition Orders & Evaluation of Intake     Current Nutrition Orders & Evaluation of Intake       Current PO Diet Diet: Cardiac; Healthy Heart (2-3 Na+); Fluid Consistency: Thin (IDDSI 0)   Supplement No active supplement orders       Nutrition Intervention/Prescription        No further nutrition intervention indicated.       Medical Nutrition Therapy/Nutrition Education          Learner     Readiness Patient  Education not indicated.      Method     Response N/A  N/A     Monitor/Evaluation        Monitor Per protocol     Nutrition Discharge Plan         No discharge nutrition needs  identified.      Electronically signed by:  Helga Swain RD  01/08/25 06:53 EST

## 2025-01-08 NOTE — PROGRESS NOTES
RT EQUIPMENT DEVICE RELATED - SKIN ASSESSMENT    RT Medical Equipment/Device:     NIV Mask:  Full-face    size: M    Skin Assessment:      Cheek:  Intact  Chin:  Intact  Forehead:  Intact  Nose:  Intact    Device Skin Pressure Protection:  Positioning supports utilized    Nurse Notification:  Pepper Correa, RRT

## 2025-01-08 NOTE — PLAN OF CARE
Goal Outcome Evaluation:              Outcome Evaluation: pt refused Bipap this shift - resp aware. Continued his 2L NC. Able to make needs known. Up ad imani. NAD noted at this time

## 2025-01-08 NOTE — CONSULTS
"COPD disease process and management discussion attempted with Mr. Quinteros. Patient denies any history of asthma or COPD. Mr. Quinteros states he is a former smoker and that he has not had significant problems with his breathing. Patient does endorse breathing problems/shortness of air due to asbestos exposure and life long issues with seasonal allergies. He does not taking maintenance medications for COPD or asthma but he does have a nebulizer to use when his allergies are giving him problems. [facility], but is not seen by pulmonologist.  Mr. Quinteros does have a history of SHOBHA but does not use his CPAP routinely, stating he breathes well enough if he sleeps on his side. \" A Patient's Guide to COPD\" was provided to patient and he was encouraged to follow up with [facility] provider for potential referral to pulmonologist.    "

## 2025-01-09 ENCOUNTER — READMISSION MANAGEMENT (OUTPATIENT)
Dept: CALL CENTER | Facility: HOSPITAL | Age: 65
End: 2025-01-09
Payer: OTHER GOVERNMENT

## 2025-01-09 VITALS
TEMPERATURE: 98 F | RESPIRATION RATE: 20 BRPM | SYSTOLIC BLOOD PRESSURE: 127 MMHG | DIASTOLIC BLOOD PRESSURE: 72 MMHG | HEART RATE: 64 BPM | BODY MASS INDEX: 40.12 KG/M2 | OXYGEN SATURATION: 95 % | WEIGHT: 218.03 LBS | HEIGHT: 62 IN

## 2025-01-09 LAB
ANION GAP SERPL CALCULATED.3IONS-SCNC: 9.4 MMOL/L (ref 5–15)
ARTERIAL PATENCY WRIST A: POSITIVE
ATMOSPHERIC PRESS: 750.2 MMHG
BASE EXCESS BLDA CALC-SCNC: -0.3 MMOL/L (ref -2–2)
BDY SITE: ABNORMAL
BUN SERPL-MCNC: 35 MG/DL (ref 8–23)
BUN/CREAT SERPL: 28.2 (ref 7–25)
CALCIUM SPEC-SCNC: 8.6 MG/DL (ref 8.6–10.5)
CHLORIDE SERPL-SCNC: 101 MMOL/L (ref 98–107)
CO2 SERPL-SCNC: 27.6 MMOL/L (ref 22–29)
CREAT SERPL-MCNC: 1.24 MG/DL (ref 0.76–1.27)
DEPRECATED RDW RBC AUTO: 45.1 FL (ref 37–54)
EGFRCR SERPLBLD CKD-EPI 2021: 64.9 ML/MIN/1.73
ERYTHROCYTE [DISTWIDTH] IN BLOOD BY AUTOMATED COUNT: 13.6 % (ref 12.3–15.4)
GLUCOSE SERPL-MCNC: 88 MG/DL (ref 65–99)
HCO3 BLDA-SCNC: 24.4 MMOL/L (ref 22–26)
HCT VFR BLD AUTO: 41.8 % (ref 37.5–51)
HCT VFR BLD CALC: 45 % (ref 38–51)
HEMODILUTION: NO
HGB BLD-MCNC: 13.7 G/DL (ref 13–17.7)
HGB BLDA-MCNC: 15.4 G/DL (ref 12–18)
MAGNESIUM SERPL-MCNC: 1.8 MG/DL (ref 1.6–2.4)
MCH RBC QN AUTO: 29.5 PG (ref 26.6–33)
MCHC RBC AUTO-ENTMCNC: 32.8 G/DL (ref 31.5–35.7)
MCV RBC AUTO: 90.1 FL (ref 79–97)
MODALITY: ABNORMAL
PCO2 BLDA: 39 MM HG (ref 35–45)
PH BLDA: 7.4 PH UNITS (ref 7.35–7.45)
PLATELET # BLD AUTO: 218 10*3/MM3 (ref 140–450)
PMV BLD AUTO: 11.3 FL (ref 6–12)
PO2 BLDA: 51.2 MM HG (ref 80–100)
POTASSIUM SERPL-SCNC: 4.2 MMOL/L (ref 3.5–5.2)
RBC # BLD AUTO: 4.64 10*6/MM3 (ref 4.14–5.8)
SAO2 % BLDCOA: 86 % (ref 95–99)
SODIUM SERPL-SCNC: 138 MMOL/L (ref 136–145)
WBC NRBC COR # BLD AUTO: 10.69 10*3/MM3 (ref 3.4–10.8)

## 2025-01-09 PROCEDURE — 94799 UNLISTED PULMONARY SVC/PX: CPT

## 2025-01-09 PROCEDURE — 85027 COMPLETE CBC AUTOMATED: CPT | Performed by: INTERNAL MEDICINE

## 2025-01-09 PROCEDURE — 99239 HOSP IP/OBS DSCHRG MGMT >30: CPT | Performed by: INTERNAL MEDICINE

## 2025-01-09 PROCEDURE — 94618 PULMONARY STRESS TESTING: CPT

## 2025-01-09 PROCEDURE — 36600 WITHDRAWAL OF ARTERIAL BLOOD: CPT

## 2025-01-09 PROCEDURE — 94664 DEMO&/EVAL PT USE INHALER: CPT

## 2025-01-09 PROCEDURE — G0008 ADMIN INFLUENZA VIRUS VAC: HCPCS | Performed by: STUDENT IN AN ORGANIZED HEALTH CARE EDUCATION/TRAINING PROGRAM

## 2025-01-09 PROCEDURE — 80048 BASIC METABOLIC PNL TOTAL CA: CPT | Performed by: INTERNAL MEDICINE

## 2025-01-09 PROCEDURE — 82803 BLOOD GASES ANY COMBINATION: CPT

## 2025-01-09 PROCEDURE — 83735 ASSAY OF MAGNESIUM: CPT | Performed by: INTERNAL MEDICINE

## 2025-01-09 PROCEDURE — 90656 IIV3 VACC NO PRSV 0.5 ML IM: CPT | Performed by: STUDENT IN AN ORGANIZED HEALTH CARE EDUCATION/TRAINING PROGRAM

## 2025-01-09 PROCEDURE — 25010000002 INFLUENZA VIRUS VACC SPLIT PF 0.5 ML SUSPENSION PREFILLED SYRINGE: Performed by: STUDENT IN AN ORGANIZED HEALTH CARE EDUCATION/TRAINING PROGRAM

## 2025-01-09 PROCEDURE — 94761 N-INVAS EAR/PLS OXIMETRY MLT: CPT

## 2025-01-09 RX ADMIN — BUMETANIDE 2 MG: 1 TABLET ORAL at 08:58

## 2025-01-09 RX ADMIN — ALBUTEROL SULFATE 2.5 MG: 2.5 SOLUTION RESPIRATORY (INHALATION) at 00:44

## 2025-01-09 RX ADMIN — Medication 10 ML: at 08:59

## 2025-01-09 RX ADMIN — ASPIRIN 81 MG: 81 TABLET, CHEWABLE ORAL at 08:58

## 2025-01-09 RX ADMIN — PREGABALIN 100 MG: 100 CAPSULE ORAL at 08:58

## 2025-01-09 RX ADMIN — FAMOTIDINE 20 MG: 20 TABLET, FILM COATED ORAL at 08:07

## 2025-01-09 RX ADMIN — GUAIFENESIN AND DEXTROMETHORPHAN 5 ML: 100; 10 SYRUP ORAL at 11:40

## 2025-01-09 RX ADMIN — ARFORMOTEROL TARTRATE 15 MCG: 15 SOLUTION RESPIRATORY (INHALATION) at 06:17

## 2025-01-09 RX ADMIN — HYDROCODONE BITARTRATE AND ACETAMINOPHEN 1 TABLET: 7.5; 325 TABLET ORAL at 05:48

## 2025-01-09 RX ADMIN — METOPROLOL SUCCINATE 50 MG: 50 TABLET, EXTENDED RELEASE ORAL at 08:58

## 2025-01-09 RX ADMIN — BUDESONIDE 0.5 MG: 0.5 INHALANT RESPIRATORY (INHALATION) at 06:17

## 2025-01-09 RX ADMIN — LOSARTAN POTASSIUM 25 MG: 25 TABLET, FILM COATED ORAL at 08:58

## 2025-01-09 RX ADMIN — HYDROCODONE BITARTRATE AND ACETAMINOPHEN 1 TABLET: 7.5; 325 TABLET ORAL at 00:22

## 2025-01-09 RX ADMIN — ACETYLCYSTEINE 3 ML: 200 SOLUTION ORAL; RESPIRATORY (INHALATION) at 06:17

## 2025-01-09 RX ADMIN — INFLUENZA A VIRUS A/VICTORIA/4897/2022 IVR-238 (H1N1) ANTIGEN (FORMALDEHYDE INACTIVATED), INFLUENZA A VIRUS A/CALIFORNIA/122/2022 SAN-022 (H3N2) ANTIGEN (FORMALDEHYDE INACTIVATED), AND INFLUENZA B VIRUS B/MICHIGAN/01/2021 ANTIGEN (FORMALDEHYDE INACTIVATED) 0.5 ML: 15; 15; 15 INJECTION, SUSPENSION INTRAMUSCULAR at 15:37

## 2025-01-09 RX ADMIN — ALBUTEROL SULFATE 2.5 MG: 2.5 SOLUTION RESPIRATORY (INHALATION) at 11:35

## 2025-01-09 RX ADMIN — GUAIFENESIN AND DEXTROMETHORPHAN 5 ML: 100; 10 SYRUP ORAL at 08:07

## 2025-01-09 RX ADMIN — ACETYLCYSTEINE 3 ML: 200 SOLUTION ORAL; RESPIRATORY (INHALATION) at 11:35

## 2025-01-09 RX ADMIN — ALBUTEROL SULFATE 2.5 MG: 2.5 SOLUTION RESPIRATORY (INHALATION) at 06:17

## 2025-01-09 NOTE — PLAN OF CARE
Goal Outcome Evaluation:  Plan of Care Reviewed With: patient        Progress: improving  Outcome Evaluation: Patient ambulating in room independently, tolerating well, will require 2L O2 continuous at this time. Ready to call wife and go home, [facility]. All questions and concerns addressed by staff, no other issues at this time.

## 2025-01-09 NOTE — DISCHARGE SUMMARY
[facility]         HOSPITALIST  DISCHARGE SUMMARY    Patient Name: Radames Quinteros  : 1960  MRN: [id number]    Date of Admission: 2025  Date of Discharge:  25  Primary Care Physician: Vishal Jasso MD    Hospital Problems:  Acute hypoxic respiratory failure  RSV with bronchitis and wheezing  Acute on chronic diastolic heart failure  Probable COPD with exacerbation  Left abdominal hernia  Essential hypertension  SHOBHA noncompliant with home NIPPV  Obesity (BMI: 40)  History of tobacco abuse, quit smoking     Hospital Course     Hospital Course:  Radames Quinteros is a 64 y.o. male with obesity (BMI: 40), SHOBHA not tolerant of NIPPV, essential hypertension presented with 5 to 6 days of worsening shortness of breath and found to have RSV and diastolic heart failure exacerbation.  Patient also with probable undiagnosed COPD, exacerbation treated.  Diuresis, nebulizer breathing treatments and steroid therapy started, there was improvement in patient's respiratory status noted.  Prior to discharge walk test completed and on room air ABG obtained, patient qualified for home O2 which was set up for patient prior to discharge.  Additionally, patient noted to have left abdominal hernia which was reducible, patient can be referred to general surgery by PCP for further evaluation as an outpatient no indication for inpatient management per surgery service.  On day of discharge patient hemodynamically stable and no additional inpatient evaluation or workup necessary at this time, patient will discharge home with outpatient follow-up with PCP.    DISCHARGE Follow Up Recommendations for labs and diagnostics:   -Follow-up with PCP in 3 to 5 days.  Recommend PCP refer patient to general surgery for hernia evaluation.  Also recommend patient's PCP refer patient to pulmonology and/or have PFTs obtained to see if patient has diagnosis of COPD.    Day of Discharge     Vital Signs:  Temp:   [97.3 °F (36.3 °C)-98.1 °F (36.7 °C)] 98 °F (36.7 °C)  Heart Rate:  [57-78] 64  Resp:  [18-22] 20  BP: (110-164)/(63-90) 127/72  Flow (L/min) (Oxygen Therapy):  [1-1.5] 1  Physical Exam:   Gen: No acute distress, pleasant, conversant  Resp: Improved aeration, normal respiratory effort, equal chest rise bilaterally  Card: RRR, No m/r/g  Abd: Soft, Nontender, Nondistended, + bowel sounds     Discharge Details        Discharge Medications        Continue These Medications        Instructions Start Date   acetaminophen 500 MG tablet  Commonly known as: TYLENOL   Take 2 tablets by mouth Every 4 (Four) Hours As Needed.      albuterol 1.25 MG/3ML nebulizer solution  Commonly known as: ACCUNEB   1.25 mg, Nebulization, Every 6 Hours PRN      aspirin 81 MG chewable tablet   81 mg, Oral, Daily      bumetanide 2 MG tablet  Commonly known as: BUMEX   2 mg, Oral, Daily PRN      celecoxib 200 MG capsule  Commonly known as: CeleBREX   200 mg, Oral, Daily      clobetasol 0.05 % ointment  Commonly known as: TEMOVATE   Apply 1 Application topically to the appropriate area as directed 2 (Two) Times a Day.      folic acid 1 MG tablet  Commonly known as: FOLVITE   1 mg, Oral, Daily      HYDROcodone-acetaminophen 7.5-325 MG per tablet  Commonly known as: NORCO   1 tablet, Oral, Every 6 Hours PRN      metoprolol succinate  MG 24 hr tablet  Commonly known as: TOPROL-XL   0.5 tablets, Oral, Daily      pregabalin 150 MG capsule  Commonly known as: LYRICA   Take 1 capsule by mouth 2 (Two) Times a Day.      simethicone 80 MG chewable tablet  Commonly known as: Gas-X   Take 2 tablets PO after completing movi prep and 2 tablets PO 4 hours prior to procedure.      telmisartan 80 MG tablet  Commonly known as: MICARDIS   80 mg, Oral, Daily               Allergies   Allergen Reactions    Ropinirole Anaphylaxis and Rash     rash    Silicone Anaphylaxis and Rash     rash    Varenicline Other (See Comments)     per patient report    Adhesive Tape  Rash    Amitriptyline Dermatitis and Rash     rash    Dilantin [Phenytoin] Rash    Latex Rash    Tape Rash       Discharge Disposition:      Diet:  Hospital:  Diet Order   Procedures    Diet: Cardiac; Healthy Heart (2-3 Na+); Fluid Consistency: Thin (IDDSI 0)       Discharge Activity:   Activity Instructions       Activity as Tolerated              CODE STATUS:  Code Status and Medical Interventions: CPR (Attempt to Resuscitate); Full Support   Ordered at: 01/02/25 2130     Code Status (Patient has no pulse and is not breathing):    CPR (Attempt to Resuscitate)     Medical Interventions (Patient has pulse or is breathing):    Full Support       No future appointments.    Additional Instructions for the Follow-ups that You Need to Schedule       Discharge Follow-up with PCP   As directed       Currently Documented PCP:    Vishal Jasso MD    PCP Phone Number:    [phone]     Follow Up Details: Follow-up in 3 to 5 days.  Recommend PCP refer patient to pulmonology and or obtain PFTs to see if patient has diagnosis of COPD.                Pertinent  and/or Most Recent Results     RADIOLOGY:  XR Abdomen KUB [[id number]] Vasquez as Reviewed   Order Status: Completed Collected: 01/04/25 1715    Updated: 01/04/25 1718   Narrative:     XR ABDOMEN KUB    Date of Exam: 1/4/2025 4:07 PM EST    Indication: abdominal pain, suspect new left umbilical hernia from coughin    Comparison: CT abdomen pelvis 3/21/2023    Findings:  Moderate formed colonic stool. No dilated loops of bowel to suggest ileus or obstruction. Extensive multilevel thoracolumbar and sacral fusion hardware without apparent radiographic complication. No significant gastric distention. No appreciable renal  calcifications. 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   Impression:     Impression:  Moderate colonic stool without signs of obstruction.      Electronically Signed: Vish Lindsey MD   1/4/2025 5:16 PM EST   Workstation ID:  "[id number]    CT Angiogram Chest Pulmonary Embolism [[id number]] Vasquez as Reviewed   Order Status: Completed Collected: 01/02/25 2348    Updated: 01/02/25 2358   Narrative:     CT ANGIOGRAM CHEST PULMONARY EMBOLISM-     Date of exam: 1/2/2025, 11:42 P.M.     Indications: SOA/SOB/shortness of air/shortness of breath; j96.01-acute  respiratory failure with hypoxia; b33.8-other specified viral diseases.     Comparisons: 1/2/2025; 6/15/2024; 5/31/2024; 3/11/2023; 5/24/2021.     TECHNIQUE:  Axial CT images were obtained of the chest after the uneventful  intravenous administration of 100 mL (or less) of Isovue-370 nonionic  contrast agent. Reconstructed 2D coronal and sagittal images were also  obtained. Automated exposure control and iterative construction methods  were used. No 3D \"radial range\" reformation is provided. No other 3D  reformations are provided at the time of this interpretation. Please see  the EMR (i.e., [facility]) for the documented dose of intravenous contrast  agent as well as the radiation dose.     FINDINGS:  LUNGS: No acute infiltrate. No suspicious pulmonary nodules.  Subsegmental atelectasis is seen bilaterally, especially in the  posterior right upper lobe and in the left upper lobe.  VASCULATURE: Except for probably no central (or proximal) or saddle  embolus, the study is not diagnostic for evaluation of pulmonary  embolism due to limitations of the contrast bolus. No repeat CTA imaging  was performed. Minimal coronary artery calcifications are seen. Mild  atherosclerotic changes are present elsewhere.  TYLER: No enlarged hilar lymph nodes.  MEDIASTINUM: No enlarged mediastinal lymph nodes. Nonspecific small to  moderate-sized mediastinal lymph nodes are noted.  CARDIAC: No cardiac enlargement. There may be a trace amount of  pericardial effusion.  AORTA: No thoracic aortic aneurysm or dissection.  PLEURA: No pleural effusion. No pneumothorax.  CHEST WALL: No mass or axillary adenopathy. No " subcutaneous emphysema.  No focal fluid collection.  LIMITED ABDOMEN: No acute findings are seen in the partially imaged  upper abdomen. There is a small hiatal hernia. A moderate-to-large stool  burden is suggested. There is nonspecific mild nodularity of the  bilateral adrenal glands. There may be hepatomegaly. No splenomegaly. An  endovascular stent is in place within the right renal artery.  BONES: No acute fracture. No aggressive osseous lesion. Extensive  posterior spinal fusion is seen; it obscures detail on the exam.  Degenerative changes involve the imaged spine and the bilateral  shoulders.  OTHER: The central tracheobronchial tree is well aerated without filling  defect. There may be mild transverse narrowing of the superior trachea,  probably related to vascular ectasia.         Impression:     No central (or proximal) pulmonary embolism. Otherwise, the study is not  diagnostic for evaluation of more distal pulmonary emboli due to  limitations of the contrast bolus. No thoracic aortic aneurysm or  dissection. No acute infiltrate. Please see above comments for further  detail.        Portions of this note were completed with a voice recognition program.     Electronically Signed By-Radames Riggs MD On:1/2/2025 11:56 PM       XR Chest 1 View [[id number]] Vasquez as Reviewed   Order Status: Completed Collected: 01/02/25 1959    Updated: 01/02/25 2004   Narrative:     XR CHEST 1 VW    Date of Exam: 1/2/2025 7:15 PM EST    Indication: SOA Triage Protocol    Comparison: 6/15/2024    Findings:  Heart size top normal, stable. Lungs are without consolidation. Negative for pneumothorax or pleural effusion. Excision hardware noted in the lower cervical spine in the lower thoracic and upper lumbar spine partially imaged.   Impression:     Impression:  No acute process.      Electronically Signed: Aakash Rogers MD   1/2/2025 8:02 PM EST   Workstation ID: [id number]     LAB RESULTS:      Lab 01/09/25  0432 01/08/25  0435  01/07/25 0432 01/06/25 0424 01/05/25 0428 01/04/25 0431 01/03/25 0410 01/02/25 2138 01/02/25  1823   WBC 10.69 9.46 8.86 8.26 10.28 11.23* 4.93  --  6.69   HEMOGLOBIN 13.7 13.8 14.2 14.8 14.4 15.1 15.5  --  15.7   HEMATOCRIT 41.8 42.7 44.8 46.1 45.2 47.2 47.7  --  48.6   PLATELETS 218 216 203 204 196 219 186  --  191   NEUTROS ABS  --   --  6.16 5.61 7.54* 9.37* 4.46  --  5.92   IMMATURE GRANS (ABS)  --   --  0.03 0.02 0.03 0.03 0.01  --  0.01   LYMPHS ABS  --   --  1.75 1.78 1.59 0.97 0.38*  --  0.44*   MONOS ABS  --   --  0.75 0.73 1.07* 0.84 0.07*  --  0.28   EOS ABS  --   --  0.15 0.11 0.03 0.00 0.00  --  0.00   MCV 90.1 90.9 91.8 91.5 90.9 92.0 90.3  --  89.8   PROCALCITONIN  --   --   --   --   --   --  0.13  --   --    LACTATE  --   --   --   --   --   --   --  1.4 2.4*         Lab 01/09/25 0432 01/08/25 0435 01/07/25 0432 01/06/25  0609 01/05/25 0428 01/04/25 0431   SODIUM 138 136 138 140 140 139   POTASSIUM 4.2 4.2 4.0 4.6 3.8 4.3   CHLORIDE 101 102 100 101 101 104   CO2 27.6 25.6 26.6 28.7 25.5 22.9   ANION GAP 9.4 8.4 11.4 10.3 13.5 12.1   BUN 35* 35* 38* 33* 40* 34*   CREATININE 1.24 1.08 1.25 1.26 1.35* 1.37*   EGFR 64.9 76.6 64.3 63.7 58.6* 57.6*   GLUCOSE 88 97 92 102* 128* 139*   CALCIUM 8.6 8.8 9.0 9.0 8.9 8.8   MAGNESIUM 1.8 1.9 2.0 1.9 2.0 2.0   PHOSPHORUS  --   --  3.3 3.2 4.1 4.1         Lab 01/02/25  1823   TOTAL PROTEIN 7.7   ALBUMIN 4.0   GLOBULIN 3.7   ALT (SGPT) 21   AST (SGOT) 30   BILIRUBIN 0.8   ALK PHOS 94         Lab 01/02/25  1928 01/02/25  1823   PROBNP  --  692.0   HSTROP T 22* 23*                 Lab 01/09/25  0904   PH, ARTERIAL 7.403   PCO2, ARTERIAL 39.0   PO2 ART 51.2*   O2 SATURATION ART 86.0*   HCO3 ART 24.4   BASE EXCESS ART -0.3     Brief Urine Lab Results       None          Microbiology Results (last 10 days)       Procedure Component Value - Date/Time    Blood Culture - Blood, Arm, Right [[id number]]  (Normal) Collected: 01/02/25 2138    Lab Status: Final  result Specimen: Blood from Arm, Right Updated: 01/07/25 2200     Blood Culture No growth at 5 days    COVID PRE-OP / PRE-PROCEDURE SCREENING ORDER (NO ISOLATION) - Swab, Nasopharynx [[id number]]  (Abnormal) Collected: 01/02/25 1852    Lab Status: Final result Specimen: Swab from Nasopharynx Updated: 01/02/25 1946    Narrative:      The following orders were created for panel order COVID PRE-OP / PRE-PROCEDURE SCREENING ORDER (NO ISOLATION) - Swab, Nasopharynx.  Procedure                               Abnormality         Status                     ---------                               -----------         ------                     COVID-19, FLU A/B, RSV P...[[id number]]  Abnormal            Final result                 Please view results for these tests on the individual orders.    COVID-19, FLU A/B, RSV PCR 1 HR TAT - Swab, Nasopharynx [[id number]]  (Abnormal) Collected: 01/02/25 1852    Lab Status: Final result Specimen: Swab from Nasopharynx Updated: 01/02/25 1946     COVID19 Not Detected     Influenza A PCR Not Detected     Influenza B PCR Not Detected     RSV, PCR Detected    Narrative:      Fact sheet for providers: https://www.fda.gov/media/486758/download    Fact sheet for patients: https://www.fda.gov/media/308592/download    Test performed by PCR.    Blood Culture - Blood, Arm, Right [[id number]]  (Normal) Collected: 01/02/25 1823    Lab Status: Final result Specimen: Blood from Arm, Right Updated: 01/07/25 1845     Blood Culture No growth at 5 days    Narrative:      Less than seven (7) mL's of blood was collected.  Insufficient quantity may yield false negative results.            Results for orders placed during the hospital encounter of 01/02/25    Adult Transthoracic Echo Complete W/ Cont if Necessary Per Protocol    Interpretation Summary  Right-sided chambers are not well-seen.  Left atrium and LV has normal size  LV has normal wall thickness.  No regional wall motion abnormalities are present.   Systolic function is normal calculated LVEF is greater than 55%.  Diastolic function is moderately abnormal with pseudo normal filling pattern.  RV has normal systolic function.  Trileaflet aortic valve.  Leaflets are thickened with reduced excursion.  Mild aortic stenosis is noted, V-max is 2.1 m/s, mean gradient 11, FRANCOIS 1.85 cm², DVI 0.5.  Mild aortic regurgitation is noted via color-flow Doppler.  Mitral valve has thickened leaflets with preserved leaflet excursion.  Mild MAC is noted  Tricuspid valve is not well-visualized..  TR is trace.  RVSP cannot be estimated due to insufficient TR jet  No pericardial effusion is noted.  Aortic root has normal dimensions.  IVC has normal size.  Estimated right atrial pressure is 0 to 5 mmHg    No prior studies available for comparison      Time spent on Discharge including face to face service:  31 minutes    Electronically signed by Tr Hester MD, 01/09/25, 1:23 PM EST.

## 2025-01-09 NOTE — PROGRESS NOTES
RT EQUIPMENT DEVICE RELATED - SKIN ASSESSMENT    RT Medical Equipment/Device:     NIV Mask:  Under-the-nose   size:       Skin Assessment:      Cheek:  Intact  Chin:  Intact  Neck:  Intact  Nose:  Intact    Device Skin Pressure Protection:  Pressure points protected    Nurse Notification:  Pepper Good, RRT    No

## 2025-01-09 NOTE — PLAN OF CARE
Goal Outcome Evaluation:           Progress: no change  Outcome Evaluation: No acute changes during this shift. Pt has rested more tonight. NAD noted. Call light within reach.

## 2025-01-09 NOTE — PROGRESS NOTES
Walking Oximetry Progress Note      Patient Name:  Radames Quinteros  YOB: 1960  Date of Procedure: 01/09/25              ROOM AIR BASELINE   SpO2% 87   Heart Rate 63     EXERCISE ON ROOM AIR SpO2% EXERCISE ON O2 LPM SpO2%   1 MINUTE  1 MINUTE PD 1 90   2 MINUTES  2 MINUTES PD 2 93   3 MINUTES  3 MINUTES PD 2 93   4 MINUTES  4 MINUTES PD 2 93   5 MINUTES  5 MINUTES PD 2 92   6 MINUTES  6 MINUTES PD 2 91              Time to Recovery  5 Minute   SpO2% Post Exercise  93%  on 1 LPM CF.    HR Post Exercise  70     Comments:           Electronically signed by Stephanie Fox CRT, 01/09/25, 8:09 AM EST.

## 2025-01-09 NOTE — PLAN OF CARE
Goal Outcome Evaluation:Pt refused BIPAP tonight. He stated he cannot handle the exhalation noise. He is on 1.5L.

## 2025-01-09 NOTE — PLAN OF CARE
Goal Outcome Evaluation:  Plan of Care Reviewed With: patient        Progress: no change  Outcome Evaluation: Patient refused CPAP 8 last night; stated that it makes too much noise; patient currently on 1L nc and trying to wean to room air; patient on a continuous pulse oximeter

## 2025-01-10 NOTE — OUTREACH NOTE
Prep Survey      Flowsheet Row Responses   [facility] facility patient discharged from? [facility]   Is LACE score < 7 ? No   Eligibility Readm Mgmt   Discharge diagnosis SOB (shortness of breath)   Does the patient have one of the following disease processes/diagnoses(primary or secondary)? Other   Prep survey completed? Yes            Ena GUTIERREZ - Registered Nurse

## 2025-01-14 ENCOUNTER — READMISSION MANAGEMENT (OUTPATIENT)
Dept: CALL CENTER | Facility: HOSPITAL | Age: 65
End: 2025-01-14
Payer: OTHER GOVERNMENT

## 2025-01-14 LAB
QT INTERVAL: 403 MS
QTC INTERVAL: 467 MS

## 2025-01-14 NOTE — OUTREACH NOTE
Medical Week 1 Survey      Flowsheet Row Responses   [facility] facility patient discharged from? [facility]   Does the patient have one of the following disease processes/diagnoses(primary or secondary)? Other   Week 1 attempt successful? No   Unsuccessful attempts Attempt 1            Ria ENCISO - Registered Nurse

## 2025-01-20 ENCOUNTER — READMISSION MANAGEMENT (OUTPATIENT)
Dept: CALL CENTER | Facility: HOSPITAL | Age: 65
End: 2025-01-20
Payer: OTHER GOVERNMENT

## 2025-01-20 NOTE — OUTREACH NOTE
Medical Week 1 Survey      Flowsheet Row Responses   [facility] facility patient discharged from? [facility]   Does the patient have one of the following disease processes/diagnoses(primary or secondary)? Other   Week 1 attempt successful? No   Unsuccessful attempts Attempt 2            Veronica MORAN - Registered Nurse

## 2025-01-28 ENCOUNTER — READMISSION MANAGEMENT (OUTPATIENT)
Dept: CALL CENTER | Facility: HOSPITAL | Age: 65
End: 2025-01-28
Payer: OTHER GOVERNMENT

## 2025-01-28 NOTE — OUTREACH NOTE
Medical Week 3 Survey      Flowsheet Row Responses   [facility] patient discharged from? [facility]   Does the patient have one of the following disease processes/diagnoses(primary or secondary)? Other   Week 3 attempt successful? No   Unsuccessful attempts Attempt 1            FRANCO ORDAZ - Registered Nurse

## (undated) DEVICE — SOLIDIFIER LIQLOC PLS 1500CC BT

## (undated) DEVICE — Device

## (undated) DEVICE — SOL IRR H2O BTL 1000ML STRL

## (undated) DEVICE — CONN JET HYDRA H20 AUXILIARY DISP

## (undated) DEVICE — Device: Brand: DEFENDO AIR/WATER/SUCTION AND BIOPSY VALVE

## (undated) DEVICE — THE SINGLE USE ETRAP – POLYP TRAP IS USED FOR SUCTION RETRIEVAL OF ENDOSCOPICALLY REMOVED POLYPS.: Brand: ETRAP

## (undated) DEVICE — STERILE POLYISOPRENE POWDER-FREE SURGICAL GLOVES: Brand: PROTEXIS

## (undated) DEVICE — SNAR POLYP CAPTIFLEX XS/OVL 11X2.4MM 240CM 1P/U

## (undated) DEVICE — SOL IRRG H2O PL/BG 1000ML STRL

## (undated) DEVICE — STERILE POLYISOPRENE POWDER-FREE SURGICAL GLOVES WITH EMOLLIENT COATING: Brand: PROTEXIS

## (undated) DEVICE — TUBING, SUCTION, 1/4" X 10', STRAIGHT: Brand: MEDLINE

## (undated) DEVICE — PAD GRND REM PED DISP

## (undated) DEVICE — LINER SURG CANSTR SXN S/RIGD 1500CC